# Patient Record
Sex: MALE | Race: WHITE | NOT HISPANIC OR LATINO | Employment: OTHER | URBAN - METROPOLITAN AREA
[De-identification: names, ages, dates, MRNs, and addresses within clinical notes are randomized per-mention and may not be internally consistent; named-entity substitution may affect disease eponyms.]

---

## 2024-02-14 ENCOUNTER — APPOINTMENT (EMERGENCY)
Dept: RADIOLOGY | Facility: HOSPITAL | Age: 74
DRG: 196 | End: 2024-02-14
Payer: MEDICARE

## 2024-02-14 ENCOUNTER — APPOINTMENT (INPATIENT)
Dept: NON INVASIVE DIAGNOSTICS | Facility: HOSPITAL | Age: 74
DRG: 196 | End: 2024-02-14
Payer: MEDICARE

## 2024-02-14 ENCOUNTER — APPOINTMENT (INPATIENT)
Dept: RADIOLOGY | Facility: HOSPITAL | Age: 74
DRG: 196 | End: 2024-02-14
Payer: MEDICARE

## 2024-02-14 ENCOUNTER — HOSPITAL ENCOUNTER (INPATIENT)
Facility: HOSPITAL | Age: 74
LOS: 11 days | Discharge: NURSING, REHAB OR LONG-TERM CARE | DRG: 196 | End: 2024-02-25
Attending: EMERGENCY MEDICINE | Admitting: INTERNAL MEDICINE
Payer: MEDICARE

## 2024-02-14 DIAGNOSIS — J96.01 ACUTE RESPIRATORY FAILURE WITH HYPOXIA (HCC): ICD-10-CM

## 2024-02-14 DIAGNOSIS — R91.8 PULMONARY INFILTRATES: ICD-10-CM

## 2024-02-14 DIAGNOSIS — R21 RASH: ICD-10-CM

## 2024-02-14 DIAGNOSIS — K59.00 CONSTIPATION: ICD-10-CM

## 2024-02-14 DIAGNOSIS — R29.90 STROKE-LIKE EPISODE: ICD-10-CM

## 2024-02-14 DIAGNOSIS — R09.02 HYPOXIA: Primary | ICD-10-CM

## 2024-02-14 DIAGNOSIS — J90 PLEURAL EFFUSION: ICD-10-CM

## 2024-02-14 DIAGNOSIS — I10 HYPERTENSION: ICD-10-CM

## 2024-02-14 DIAGNOSIS — G45.9 TIA (TRANSIENT ISCHEMIC ATTACK): ICD-10-CM

## 2024-02-14 PROBLEM — M79.89 SWELLING OF LOWER LEG: Status: ACTIVE | Noted: 2024-02-14

## 2024-02-14 PROBLEM — I77.819 AORTIC ECTASIA (HCC): Status: ACTIVE | Noted: 2024-02-14

## 2024-02-14 PROBLEM — N32.81 OVERACTIVE BLADDER: Status: ACTIVE | Noted: 2024-02-14

## 2024-02-14 PROBLEM — R26.89 BALANCE PROBLEM: Status: RESOLVED | Noted: 2024-02-14 | Resolved: 2024-02-14

## 2024-02-14 PROBLEM — R26.89 BALANCE PROBLEM: Status: ACTIVE | Noted: 2024-02-14

## 2024-02-14 LAB
2HR DELTA HS TROPONIN: 1 NG/L
4HR DELTA HS TROPONIN: 0 NG/L
ALBUMIN SERPL BCP-MCNC: 4.1 G/DL (ref 3.5–5)
ALP SERPL-CCNC: 67 U/L (ref 34–104)
ALT SERPL W P-5'-P-CCNC: 8 U/L (ref 7–52)
ANION GAP SERPL CALCULATED.3IONS-SCNC: 8 MMOL/L
AST SERPL W P-5'-P-CCNC: 22 U/L (ref 13–39)
ATRIAL RATE: 83 BPM
BASE EX.OXY STD BLDV CALC-SCNC: 79.7 % (ref 60–80)
BASE EXCESS BLDV CALC-SCNC: -2.5 MMOL/L
BASOPHILS # BLD AUTO: 0.04 THOUSANDS/ÂΜL (ref 0–0.1)
BASOPHILS NFR BLD AUTO: 1 % (ref 0–1)
BILIRUB SERPL-MCNC: 0.78 MG/DL (ref 0.2–1)
BUN SERPL-MCNC: 13 MG/DL (ref 5–25)
CALCIUM SERPL-MCNC: 9.3 MG/DL (ref 8.4–10.2)
CARDIAC TROPONIN I PNL SERPL HS: 13 NG/L
CARDIAC TROPONIN I PNL SERPL HS: 13 NG/L
CARDIAC TROPONIN I PNL SERPL HS: 14 NG/L
CHLORIDE SERPL-SCNC: 105 MMOL/L (ref 96–108)
CO2 SERPL-SCNC: 25 MMOL/L (ref 21–32)
CREAT SERPL-MCNC: 0.9 MG/DL (ref 0.6–1.3)
EOSINOPHIL # BLD AUTO: 1.03 THOUSAND/ÂΜL (ref 0–0.61)
EOSINOPHIL NFR BLD AUTO: 12 % (ref 0–6)
ERYTHROCYTE [DISTWIDTH] IN BLOOD BY AUTOMATED COUNT: 13.7 % (ref 11.6–15.1)
FLUAV RNA RESP QL NAA+PROBE: NEGATIVE
FLUBV RNA RESP QL NAA+PROBE: NEGATIVE
GFR SERPL CREATININE-BSD FRML MDRD: 83 ML/MIN/1.73SQ M
GLUCOSE SERPL-MCNC: 104 MG/DL (ref 65–140)
HCO3 BLDV-SCNC: 21.8 MMOL/L (ref 24–30)
HCT VFR BLD AUTO: 49.3 % (ref 36.5–49.3)
HGB BLD-MCNC: 16.7 G/DL (ref 12–17)
IMM GRANULOCYTES # BLD AUTO: 0.02 THOUSAND/UL (ref 0–0.2)
IMM GRANULOCYTES NFR BLD AUTO: 0 % (ref 0–2)
LACTATE SERPL-SCNC: 1.7 MMOL/L (ref 0.5–2)
LYMPHOCYTES # BLD AUTO: 0.89 THOUSANDS/ÂΜL (ref 0.6–4.47)
LYMPHOCYTES NFR BLD AUTO: 10 % (ref 14–44)
MCH RBC QN AUTO: 30.7 PG (ref 26.8–34.3)
MCHC RBC AUTO-ENTMCNC: 33.9 G/DL (ref 31.4–37.4)
MCV RBC AUTO: 91 FL (ref 82–98)
MONOCYTES # BLD AUTO: 0.88 THOUSAND/ÂΜL (ref 0.17–1.22)
MONOCYTES NFR BLD AUTO: 10 % (ref 4–12)
NEUTROPHILS # BLD AUTO: 5.79 THOUSANDS/ÂΜL (ref 1.85–7.62)
NEUTS SEG NFR BLD AUTO: 67 % (ref 43–75)
NRBC BLD AUTO-RTO: 0 /100 WBCS
O2 CT BLDV-SCNC: 17.3 ML/DL
P AXIS: 52 DEGREES
PCO2 BLDV: 36.4 MM HG (ref 42–50)
PH BLDV: 7.39 [PH] (ref 7.3–7.4)
PLATELET # BLD AUTO: 209 THOUSANDS/UL (ref 149–390)
PMV BLD AUTO: 8.9 FL (ref 8.9–12.7)
PO2 BLDV: 44.6 MM HG (ref 35–45)
POTASSIUM SERPL-SCNC: 4.3 MMOL/L (ref 3.5–5.3)
PR INTERVAL: 184 MS
PROCALCITONIN SERPL-MCNC: 0.05 NG/ML
PROT SERPL-MCNC: 7.6 G/DL (ref 6.4–8.4)
QRS AXIS: 13 DEGREES
QRSD INTERVAL: 92 MS
QT INTERVAL: 392 MS
QTC INTERVAL: 460 MS
RBC # BLD AUTO: 5.44 MILLION/UL (ref 3.88–5.62)
RSV RNA RESP QL NAA+PROBE: NEGATIVE
SARS-COV-2 RNA RESP QL NAA+PROBE: NEGATIVE
SODIUM SERPL-SCNC: 138 MMOL/L (ref 135–147)
T WAVE AXIS: 35 DEGREES
TSH SERPL DL<=0.05 MIU/L-ACNC: 4.52 UIU/ML (ref 0.45–4.5)
VENTRICULAR RATE: 83 BPM
WBC # BLD AUTO: 8.65 THOUSAND/UL (ref 4.31–10.16)

## 2024-02-14 PROCEDURE — 0B9D8ZX DRAINAGE OF RIGHT MIDDLE LUNG LOBE, VIA NATURAL OR ARTIFICIAL OPENING ENDOSCOPIC, DIAGNOSTIC: ICD-10-PCS | Performed by: PHYSICIAN ASSISTANT

## 2024-02-14 PROCEDURE — 87081 CULTURE SCREEN ONLY: CPT | Performed by: INTERNAL MEDICINE

## 2024-02-14 PROCEDURE — 99285 EMERGENCY DEPT VISIT HI MDM: CPT

## 2024-02-14 PROCEDURE — 84443 ASSAY THYROID STIM HORMONE: CPT

## 2024-02-14 PROCEDURE — 85025 COMPLETE CBC W/AUTO DIFF WBC: CPT | Performed by: EMERGENCY MEDICINE

## 2024-02-14 PROCEDURE — 80053 COMPREHEN METABOLIC PANEL: CPT | Performed by: EMERGENCY MEDICINE

## 2024-02-14 PROCEDURE — 87040 BLOOD CULTURE FOR BACTERIA: CPT | Performed by: EMERGENCY MEDICINE

## 2024-02-14 PROCEDURE — 84145 PROCALCITONIN (PCT): CPT | Performed by: EMERGENCY MEDICINE

## 2024-02-14 PROCEDURE — 71250 CT THORAX DX C-: CPT

## 2024-02-14 PROCEDURE — 71045 X-RAY EXAM CHEST 1 VIEW: CPT

## 2024-02-14 PROCEDURE — 82805 BLOOD GASES W/O2 SATURATION: CPT

## 2024-02-14 PROCEDURE — 84484 ASSAY OF TROPONIN QUANT: CPT | Performed by: EMERGENCY MEDICINE

## 2024-02-14 PROCEDURE — 0241U HB NFCT DS VIR RESP RNA 4 TRGT: CPT | Performed by: EMERGENCY MEDICINE

## 2024-02-14 PROCEDURE — 93010 ELECTROCARDIOGRAM REPORT: CPT | Performed by: INTERNAL MEDICINE

## 2024-02-14 PROCEDURE — 93005 ELECTROCARDIOGRAM TRACING: CPT

## 2024-02-14 PROCEDURE — G1004 CDSM NDSC: HCPCS

## 2024-02-14 PROCEDURE — 36415 COLL VENOUS BLD VENIPUNCTURE: CPT | Performed by: EMERGENCY MEDICINE

## 2024-02-14 PROCEDURE — 87449 NOS EACH ORGANISM AG IA: CPT

## 2024-02-14 PROCEDURE — 93306 TTE W/DOPPLER COMPLETE: CPT | Performed by: INTERNAL MEDICINE

## 2024-02-14 PROCEDURE — 99285 EMERGENCY DEPT VISIT HI MDM: CPT | Performed by: EMERGENCY MEDICINE

## 2024-02-14 PROCEDURE — 99223 1ST HOSP IP/OBS HIGH 75: CPT | Performed by: INTERNAL MEDICINE

## 2024-02-14 PROCEDURE — 93306 TTE W/DOPPLER COMPLETE: CPT

## 2024-02-14 PROCEDURE — 83605 ASSAY OF LACTIC ACID: CPT | Performed by: EMERGENCY MEDICINE

## 2024-02-14 RX ORDER — LEVOTHYROXINE SODIUM 88 UG/1
88 CAPSULE ORAL DAILY
COMMUNITY

## 2024-02-14 RX ORDER — CEFTRIAXONE 1 G/50ML
1000 INJECTION, SOLUTION INTRAVENOUS EVERY 24 HOURS
Status: DISCONTINUED | OUTPATIENT
Start: 2024-02-14 | End: 2024-02-18

## 2024-02-14 RX ORDER — AZITHROMYCIN 250 MG/1
500 TABLET, FILM COATED ORAL EVERY 24 HOURS
Status: DISCONTINUED | OUTPATIENT
Start: 2024-02-14 | End: 2024-02-15

## 2024-02-14 RX ORDER — POTASSIUM CHLORIDE 750 MG/1
10 TABLET, FILM COATED, EXTENDED RELEASE ORAL DAILY
COMMUNITY
End: 2024-02-25

## 2024-02-14 RX ORDER — FAMOTIDINE 20 MG/1
20 TABLET, FILM COATED ORAL DAILY
COMMUNITY

## 2024-02-14 RX ORDER — NIFEDIPINE 30 MG/1
30 TABLET, EXTENDED RELEASE ORAL DAILY
Status: DISCONTINUED | OUTPATIENT
Start: 2024-02-14 | End: 2024-02-25 | Stop reason: HOSPADM

## 2024-02-14 RX ORDER — LEVOTHYROXINE SODIUM 88 UG/1
88 TABLET ORAL
Status: DISCONTINUED | OUTPATIENT
Start: 2024-02-14 | End: 2024-02-25 | Stop reason: HOSPADM

## 2024-02-14 RX ORDER — DULOXETIN HYDROCHLORIDE 30 MG/1
30 CAPSULE, DELAYED RELEASE ORAL DAILY
COMMUNITY

## 2024-02-14 RX ORDER — FAMOTIDINE 20 MG/1
20 TABLET, FILM COATED ORAL DAILY
Status: DISCONTINUED | OUTPATIENT
Start: 2024-02-14 | End: 2024-02-25 | Stop reason: HOSPADM

## 2024-02-14 RX ORDER — HEPARIN SODIUM 5000 [USP'U]/ML
5000 INJECTION, SOLUTION INTRAVENOUS; SUBCUTANEOUS EVERY 8 HOURS SCHEDULED
Status: DISCONTINUED | OUTPATIENT
Start: 2024-02-14 | End: 2024-02-22

## 2024-02-14 RX ORDER — MIRABEGRON 50 MG/1
50 TABLET, FILM COATED, EXTENDED RELEASE ORAL DAILY
COMMUNITY

## 2024-02-14 RX ORDER — OXYBUTYNIN CHLORIDE 5 MG/1
10 TABLET, EXTENDED RELEASE ORAL DAILY
Status: DISCONTINUED | OUTPATIENT
Start: 2024-02-14 | End: 2024-02-25 | Stop reason: HOSPADM

## 2024-02-14 RX ORDER — FUROSEMIDE 10 MG/ML
40 INJECTION INTRAMUSCULAR; INTRAVENOUS ONCE
Status: COMPLETED | OUTPATIENT
Start: 2024-02-14 | End: 2024-02-14

## 2024-02-14 RX ORDER — FUROSEMIDE 20 MG/1
20 TABLET ORAL DAILY
COMMUNITY

## 2024-02-14 RX ORDER — DOCUSATE SODIUM 100 MG/1
100 CAPSULE, LIQUID FILLED ORAL 2 TIMES DAILY
Status: DISCONTINUED | OUTPATIENT
Start: 2024-02-14 | End: 2024-02-25 | Stop reason: HOSPADM

## 2024-02-14 RX ORDER — ACETAMINOPHEN 325 MG/1
650 TABLET ORAL EVERY 6 HOURS PRN
Status: DISCONTINUED | OUTPATIENT
Start: 2024-02-14 | End: 2024-02-25 | Stop reason: HOSPADM

## 2024-02-14 RX ORDER — SENNOSIDES 8.6 MG
1 TABLET ORAL DAILY
Status: DISCONTINUED | OUTPATIENT
Start: 2024-02-14 | End: 2024-02-25 | Stop reason: HOSPADM

## 2024-02-14 RX ORDER — NIFEDIPINE 30 MG/1
30 TABLET, EXTENDED RELEASE ORAL DAILY
COMMUNITY

## 2024-02-14 RX ADMIN — HEPARIN SODIUM 5000 UNITS: 5000 INJECTION INTRAVENOUS; SUBCUTANEOUS at 21:44

## 2024-02-14 RX ADMIN — HEPARIN SODIUM 5000 UNITS: 5000 INJECTION INTRAVENOUS; SUBCUTANEOUS at 11:22

## 2024-02-14 RX ADMIN — DOCUSATE SODIUM 100 MG: 100 CAPSULE, LIQUID FILLED ORAL at 11:24

## 2024-02-14 RX ADMIN — LEVOTHYROXINE SODIUM 88 MCG: 88 TABLET ORAL at 18:16

## 2024-02-14 RX ADMIN — NIFEDIPINE 30 MG: 30 TABLET, EXTENDED RELEASE ORAL at 18:03

## 2024-02-14 RX ADMIN — AZITHROMYCIN DIHYDRATE 500 MG: 250 TABLET ORAL at 18:03

## 2024-02-14 RX ADMIN — CEFTRIAXONE 1000 MG: 1 INJECTION, SOLUTION INTRAVENOUS at 18:03

## 2024-02-14 RX ADMIN — DOCUSATE SODIUM 100 MG: 100 CAPSULE, LIQUID FILLED ORAL at 18:03

## 2024-02-14 RX ADMIN — SENNOSIDES 8.6 MG: 8.6 TABLET, FILM COATED ORAL at 11:24

## 2024-02-14 RX ADMIN — OXYBUTYNIN CHLORIDE 10 MG: 5 TABLET, EXTENDED RELEASE ORAL at 18:03

## 2024-02-14 RX ADMIN — FUROSEMIDE 40 MG: 10 INJECTION, SOLUTION INTRAMUSCULAR; INTRAVENOUS at 11:20

## 2024-02-14 RX ADMIN — FAMOTIDINE 20 MG: 20 TABLET, FILM COATED ORAL at 11:24

## 2024-02-14 NOTE — ASSESSMENT & PLAN NOTE
Chronic. On Myrbetriq ER 50 mg daily switch to oxybutynin 10 mg  Monitor I's and O's  Obtain PVR  Urinary retention protocol

## 2024-02-14 NOTE — ASSESSMENT & PLAN NOTE
"POA w/ lethargy, feeling weak, having a \"coughing fits\" from [facility] living  Hypoxic upon arrival 84% on room air, placed on 4 L with resolution now saturations are greater than 95%  Not on oxygen at baseline  No recent sick contacts, denies fever or chills  No leukocytosis & afebrile.  Negative for COVID Flu RSV  X-ray demonstrated bilateral small pleural effusion but cannot exclude infectious process  Obtain CT scan of chest without contrast:   Superimposed mixed groundglass and alveolar opacities bilaterally appear asymmetric. Findings suggest infectious etiology. Pulmonary edema also possible but less likely. If there is continued concern for interstitial lung disease, would recommend  high-resolution CT scan after the patient's clinical condition improves as an outpatient.  Rales noted bilaterally  Give 1 dose of IV 40 mg of Lasix now  Started Antbx based on CT results:   Iv Rocephin 1000 mg q 24 hours  Azithromycin 500 mg q 24 hours  Consult Pulm appreciate input  Pulmonary hygiene  Obtain sputum culture f/u   Obtain Urine strep & pneumo f/u  Titrate O2  saturation >90%  Monitor CBC with differential and fever curve    "

## 2024-02-14 NOTE — PLAN OF CARE
Problem: Prexisting or High Potential for Compromised Skin Integrity  Goal: Skin integrity is maintained or improved  Description: INTERVENTIONS:  - Identify patients at risk for skin breakdown  - Assess and monitor skin integrity  - Assess and monitor nutrition and hydration status  - Monitor labs   - Assess for incontinence   - Turn and reposition patient  - Assist with mobility/ambulation  - Relieve pressure over bony prominences  - Avoid friction and shearing  - Provide appropriate hygiene as needed including keeping skin clean and dry  - Evaluate need for skin moisturizer/barrier cream  - Collaborate with interdisciplinary team   - Patient/family teaching  - Consider wound care consult   Outcome: Progressing     Problem: SAFETY ADULT  Goal: Patient will remain free of falls  Description: INTERVENTIONS:  - Educate patient/family on patient safety including physical limitations  - Instruct patient to call for assistance with activity   - Consult OT/PT to assist with strengthening/mobility   - Keep Call bell within reach  - Keep bed low and locked with side rails adjusted as appropriate  - Keep care items and personal belongings within reach  - Initiate and maintain comfort rounds  - Make Fall Risk Sign visible to staff  - Offer Toileting every 2 Hours, in advance of need  - Initiate/Maintain bed alarm  - Obtain necessary fall risk management equipment:   - Apply yellow socks and bracelet for high fall risk patients  - Consider moving patient to room near nurses station  Outcome: Progressing  Goal: Maintain or return to baseline ADL function  Description: INTERVENTIONS:  -  Assess patient's ability to carry out ADLs; assess patient's baseline for ADL function and identify physical deficits which impact ability to perform ADLs (bathing, care of mouth/teeth, toileting, grooming, dressing, etc.)  - Assess/evaluate cause of self-care deficits   - Assess range of motion  - Assess patient's mobility; develop plan if  impaired  - Assess patient's need for assistive devices and provide as appropriate  - Encourage maximum independence but intervene and supervise when necessary  - Involve family in performance of ADLs  - Assess for home care needs following discharge   - Consider OT consult to assist with ADL evaluation and planning for discharge  - Provide patient education as appropriate  Outcome: Progressing  Goal: Maintains/Returns to pre admission functional level  Description: INTERVENTIONS:  - Perform AM-PAC 6 Click Basic Mobility/ Daily Activity assessment daily.  - Set and communicate daily mobility goal to care team and patient/family/caregiver.   - Collaborate with rehabilitation services on mobility goals if consulted  - Perform Range of Motion 2 times a day.  - Reposition patient every 2 hours.  - Dangle patient 3 times a day  - Stand patient 3 times a day  - Ambulate patient 2 times a day  - Out of bed to chair 2 times a day   - Out of bed for meals 2 times a day  - Out of bed for toileting  - Record patient progress and toleration of activity level   Outcome: Progressing     Problem: RESPIRATORY - ADULT  Goal: Achieves optimal ventilation and oxygenation  Description: INTERVENTIONS:  - Assess for changes in respiratory status  - Assess for changes in mentation and behavior  - Position to facilitate oxygenation and minimize respiratory effort  - Oxygen administered by appropriate delivery if ordered  - Initiate smoking cessation education as indicated  - Encourage broncho-pulmonary hygiene including cough, deep breathe, Incentive Spirometry  - Assess the need for suctioning and aspirate as needed  - Assess and instruct to report SOB or any respiratory difficulty  - Respiratory Therapy support as indicated  Outcome: Progressing

## 2024-02-14 NOTE — ED PROVIDER NOTES
History  Chief Complaint   Patient presents with    Shortness of Breath     Pt started with SOB over past day, covid/flu occuring at living facility. Started on 4L NC     73-year-old male past medical to significant for hypertension presenting to the ER today with cough and shortness of breath.  Patient has been coughing over the last couple days but started with shortness of breath upon awakening this morning.  Per EMS patient was hypoxic to 88% and is on 4 L nasal cannula.  Patient denying any chest pain.  Denying any fevers or chills.  No nausea or vomiting.  No abdominal pain.        None       Past Medical History:   Diagnosis Date    GERD without esophagitis     Hypertension     Hypothyroid     Prostate CA (HCC)        History reviewed. No pertinent surgical history.    History reviewed. No pertinent family history.  I have reviewed and agree with the history as documented.    E-Cigarette/Vaping    E-Cigarette Use Never User      E-Cigarette/Vaping Substances     Social History     Tobacco Use    Smoking status: Former     Types: Cigarettes    Smokeless tobacco: Never   Vaping Use    Vaping status: Never Used   Substance Use Topics    Alcohol use: Not Currently    Drug use: Never       Review of Systems   Constitutional:  Negative for chills and fever.   HENT:  Negative for hearing loss.    Eyes:  Negative for visual disturbance.   Respiratory:  Positive for cough and shortness of breath.    Cardiovascular:  Negative for chest pain.   Gastrointestinal:  Negative for abdominal pain, constipation, diarrhea, nausea and vomiting.   Genitourinary:  Negative for difficulty urinating.   Musculoskeletal:  Negative for myalgias.   Skin:  Negative for rash.   Neurological:  Negative for dizziness.   Psychiatric/Behavioral:  Negative for agitation.    All other systems reviewed and are negative.      Physical Exam  Physical Exam  Vitals and nursing note reviewed.   Constitutional:       Appearance: Normal appearance. He  is ill-appearing.   HENT:      Head: Normocephalic and atraumatic.      Right Ear: External ear normal.      Left Ear: External ear normal.      Nose: Nose normal. No congestion.      Mouth/Throat:      Mouth: Mucous membranes are moist.      Pharynx: Oropharynx is clear. No oropharyngeal exudate.   Eyes:      General:         Right eye: No discharge.         Left eye: No discharge.      Extraocular Movements: Extraocular movements intact.      Conjunctiva/sclera: Conjunctivae normal.      Pupils: Pupils are equal, round, and reactive to light.   Cardiovascular:      Rate and Rhythm: Normal rate and regular rhythm.      Pulses: Normal pulses.      Heart sounds: Normal heart sounds.   Pulmonary:      Effort: Respiratory distress present.      Breath sounds: Rhonchi present.      Comments: Hypoxic on room air; Placed on NC with improvement in O2 to 92%  Abdominal:      General: Abdomen is flat. Bowel sounds are normal. There is no distension.      Palpations: Abdomen is soft.      Tenderness: There is no abdominal tenderness.   Musculoskeletal:         General: No swelling. Normal range of motion.      Cervical back: Normal range of motion.   Skin:     General: Skin is warm and dry.      Capillary Refill: Capillary refill takes less than 2 seconds.   Neurological:      General: No focal deficit present.      Mental Status: He is alert and oriented to person, place, and time. Mental status is at baseline.      Cranial Nerves: No cranial nerve deficit.      Sensory: No sensory deficit.      Motor: No weakness.   Psychiatric:         Mood and Affect: Mood normal.         Behavior: Behavior normal.         Vital Signs  ED Triage Vitals [02/14/24 0507]   Temperature Pulse Respirations Blood Pressure SpO2   97.9 °F (36.6 °C) 89 (!) 30 149/83 (!) 84 %      Temp Source Heart Rate Source Patient Position - Orthostatic VS BP Location FiO2 (%)   Oral Monitor Lying Left arm --      Pain Score       --           Vitals:     02/14/24 0507 02/14/24 0600 02/14/24 0630   BP: 149/83 130/75 122/73   Pulse: 89 82 78   Patient Position - Orthostatic VS: Lying Lying Lying         Visual Acuity      ED Medications  Medications - No data to display    Diagnostic Studies  Results Reviewed       Procedure Component Value Units Date/Time    HS Troponin I 2hr [[id number]] Updated: 02/14/24 0709    Lab Status: No result Specimen: Blood from Arm, Left     FLU/RSV/COVID - if FLU/RSV clinically relevant [[id number]]  (Normal) Collected: 02/14/24 0515    Lab Status: Final result Specimen: Nares from Nose Updated: 02/14/24 0624     SARS-CoV-2 Negative     INFLUENZA A PCR Negative     INFLUENZA B PCR Negative     RSV PCR Negative    Narrative:      FOR PEDIATRIC PATIENTS - copy/paste COVID Guidelines URL to browser: [email or web address]    SARS-CoV-2 assay is a Nucleic Acid Amplification assay intended for the  qualitative detection of nucleic acid from SARS-CoV-2 in nasopharyngeal  swabs. Results are for the presumptive identification of SARS-CoV-2 RNA.    Positive results are indicative of infection with SARS-CoV-2, the virus  causing COVID-19, but do not rule out bacterial infection or co-infection  with other viruses. Laboratories within the [place] and its  territories are required to report all positive results to the appropriate  public health authorities. Negative results do not preclude SARS-CoV-2  infection and should not be used as the sole basis for treatment or other  patient management decisions. Negative results must be combined with  clinical observations, patient history, and epidemiological information.  This test has not been FDA cleared or approved.    This test has been authorized by FDA under an Emergency Use Authorization  (EUA). This test is only authorized for the duration of time the  declaration that circumstances exist justifying the authorization of the  emergency use of  an in vitro diagnostic tests for detection of SARS-CoV-2  virus and/or diagnosis of COVID-19 infection under section 564(b)(1) of  the Act, 21 U.S.C. 360bbb-3(b)(1), unless the authorization is terminated  or revoked sooner. The test has been validated but independent review by FDA  and CLIA is pending.    Test performed using [employer] GeneXpert: This RT-PCR assay targets N2,  a region unique to SARS-CoV-2. A conserved region in the E-gene was chosen  for pan-Sarbecovirus detection which includes SARS-CoV-2.    According to CMS-2020-01-R, this platform meets the definition of high-throughput technology.    Procalcitonin [[id number]]  (Normal) Collected: 02/14/24 0515    Lab Status: Final result Specimen: Blood from Arm, Right Updated: 02/14/24 0602     Procalcitonin 0.05 ng/ml     Lactic acid, plasma (w/reflex if result > 2.0) [[id number]]  (Normal) Collected: 02/14/24 0515    Lab Status: Final result Specimen: Blood from Arm, Right Updated: 02/14/24 0553     LACTIC ACID 1.7 mmol/L     Narrative:      Result may be elevated if tourniquet was used during collection.    Comprehensive metabolic panel [[id number]] Collected: 02/14/24 0515    Lab Status: Final result Specimen: Blood from Arm, Right Updated: 02/14/24 0552     Sodium 138 mmol/L      Potassium 4.3 mmol/L      Chloride 105 mmol/L      CO2 25 mmol/L      ANION GAP 8 mmol/L      BUN 13 mg/dL      Creatinine 0.90 mg/dL      Glucose 104 mg/dL      Calcium 9.3 mg/dL      AST 22 U/L      ALT 8 U/L      Alkaline Phosphatase 67 U/L      Total Protein 7.6 g/dL      Albumin 4.1 g/dL      Total Bilirubin 0.78 mg/dL      eGFR 83 ml/min/1.73sq m     Narrative:      National Kidney Disease Foundation guidelines for Chronic Kidney Disease (CKD):     Stage 1 with normal or high GFR (GFR > 90 mL/min/1.73 square meters)    Stage 2 Mild CKD (GFR = 60-89 mL/min/1.73 square meters)    Stage 3A Moderate CKD (GFR = 45-59 mL/min/1.73 square meters)    Stage 3B Moderate CKD (GFR = 30-44  mL/min/1.73 square meters)    Stage 4 Severe CKD (GFR = 15-29 mL/min/1.73 square meters)    Stage 5 End Stage CKD (GFR <15 mL/min/1.73 square meters)  Note: GFR calculation is accurate only with a steady state creatinine    HS Troponin 0hr (reflex protocol) [[id number]]  (Normal) Collected: 02/14/24 0515    Lab Status: Final result Specimen: Blood from Arm, Right Updated: 02/14/24 0547     hs TnI 0hr 13 ng/L     CBC and differential [[id number]]  (Abnormal) Collected: 02/14/24 0515    Lab Status: Final result Specimen: Blood from Arm, Right Updated: 02/14/24 0528     WBC 8.65 Thousand/uL      RBC 5.44 Million/uL      Hemoglobin 16.7 g/dL      Hematocrit 49.3 %      MCV 91 fL      MCH 30.7 pg      MCHC 33.9 g/dL      RDW 13.7 %      MPV 8.9 fL      Platelets 209 Thousands/uL      nRBC 0 /100 WBCs      Neutrophils Relative 67 %      Immat GRANS % 0 %      Lymphocytes Relative 10 %      Monocytes Relative 10 %      Eosinophils Relative 12 %      Basophils Relative 1 %      Neutrophils Absolute 5.79 Thousands/µL      Immature Grans Absolute 0.02 Thousand/uL      Lymphocytes Absolute 0.89 Thousands/µL      Monocytes Absolute 0.88 Thousand/µL      Eosinophils Absolute 1.03 Thousand/µL      Basophils Absolute 0.04 Thousands/µL     Blood culture [[id number]] Collected: 02/14/24 0515    Lab Status: In process Specimen: Blood from Arm, Right Updated: 02/14/24 0521    Blood culture [[id number]] Collected: 02/14/24 0516    Lab Status: In process Specimen: Blood from Arm, Left Updated: 02/14/24 0521                   XR chest 1 view portable   ED Interpretation by Kush Gaines MD (02/14 0607)   Right sided pleural effusion based on my interpretation                 Procedures  ECG 12 Lead Documentation Only    Date/Time: 2/14/2024 5:30 AM    Performed by: Kush Gaines MD  Authorized by: Kush Gaines MD    Indications / Diagnosis:  SOB  ECG reviewed by me, the ED Provider: yes    Patient location:  ED  Previous ECG:     Previous  ECG:  Unavailable    Comparison to cardiac monitor: No    Interpretation:     Interpretation: normal    Rate:     ECG rate assessment: normal    Rhythm:     Rhythm: sinus rhythm    Ectopy:     Ectopy: none    QRS:     QRS axis:  Normal    QRS intervals:  Normal  Conduction:     Conduction: normal    ST segments:     ST segments:  Normal  T waves:     T waves: normal             ED Course  ED Course as of 02/14/24 0713 Wed Feb 14, 2024   0509 SpO2(!): 84 %  Placed on NC at 4L. Responded to 92%   0528 CBC and differential(!)  No elevated white count. Surprising given his hypoxia. Hgb WNL   0601 Comprehensive metabolic panel  WNL   0603 Procalcitonin: 0.05  WNL   0603 LACTIC ACID: 1.7  WNL                               SBIRT 22yo+      Flowsheet Row Most Recent Value   Initial Alcohol Screen: US AUDIT-C     1. How often do you have a drink containing alcohol? 0 Filed at: 02/14/2024 0517   2. How many drinks containing alcohol do you have on a typical day you are drinking?  0 Filed at: 02/14/2024 0517   3a. Male UNDER 65: How often do you have five or more drinks on one occasion? 0 Filed at: 02/14/2024 0517   3b. FEMALE Any Age, or MALE 65+: How often do you have 4 or more drinks on one occassion? 0 Filed at: 02/14/2024 0517   Audit-C Score 0 Filed at: 02/14/2024 0517   XOCHILT: How many times in the past year have you...    Used an illegal drug or used a prescription medication for non-medical reasons? Never Filed at: 02/14/2024 0517                      Medical Decision Making  73-year-old male presenting to ED today with cough, shortness of breath, hypoxia.  At this time differential diagnosis includes pneumonia versus pneumothorax versus COVID/flu versus ACS.  Will evaluate at this time with CBC, CMP, troponin, twelve-lead EKG, chest x-ray.  Will check blood cultures as well as lactic/Pro-Devaughn given the patient is already tachypneic and I expect that he will have leukocytosis which will make him SIRS positive.   Patient will need admission regardless secondary to hypoxia.  More than likely this will be viral process given positive COVID flu contacts at assisted living where patient is coming from.    Amount and/or Complexity of Data Reviewed  Labs: ordered. Decision-making details documented in ED Course.  Radiology: ordered and independent interpretation performed.    Risk  Decision regarding hospitalization.             Disposition  Final diagnoses:   Hypoxia   Pleural effusion   Hypertension     Time reflects when diagnosis was documented in both MDM as applicable and the Disposition within this note       Time User Action Codes Description Comment    2/14/2024  7:03 AM Kush Gaines [R09.02] Hypoxia     2/14/2024  7:03 AM Kush Gaines [J90] Pleural effusion     2/14/2024  7:03 AM Kush Gaines [I10] Hypertension           ED Disposition       ED Disposition   Admit    Condition   Stable    Date/Time   Wed Feb 14, 2024 0705    Comment   Case was discussed with Dr. Morin and the patient's admission status was agreed to be Admission Status: inpatient status to the service of Dr. Morin .               Follow-up Information    None         Patient's Medications    No medications on file       No discharge procedures on file.    PDMP Review       None            ED Provider  Electronically Signed by             Kush Gaines MD  02/14/24 0750

## 2024-02-14 NOTE — H&P
"Formerly [facility]  H&P  Name: Neeraj Palacios 73 y.o. male I MRN: [id number]  Unit/Bed#: [address] [place] [address] I Date of Admission: 2/14/2024   Date of Service: 2/14/2024 I Hospital Day: 0      Assessment/Plan   * Hypoxia  Assessment & Plan  POA w/ lethargy, feeling weak, having a \"coughing fits\" from [facility]  Hypoxic upon arrival 84% on room air, placed on 4 L with resolution now saturations are greater than 95%  Not on oxygen at baseline  No recent sick contacts, denies fever or chills  No leukocytosis & afebrile.  Negative for COVID Flu RSV  X-ray demonstrated bilateral small pleural effusion but cannot exclude infectious process  Obtain CT scan of chest without contrast:   Superimposed mixed groundglass and alveolar opacities bilaterally appear asymmetric. Findings suggest infectious etiology. Pulmonary edema also possible but less likely. If there is continued concern for interstitial lung disease, would recommend  high-resolution CT scan after the patient's clinical condition improves as an outpatient.  Rales noted bilaterally  Give 1 dose of IV 40 mg of Lasix now  Started Antbx based on CT results:   Iv Rocephin 1000 mg q 24 hours  Azithromycin 500 mg q 24 hours  Consult Pulm appreciate input  Pulmonary hygiene  Obtain sputum culture f/u   Obtain Urine strep & pneumo f/u  Titrate O2  saturation >90%  Monitor CBC with differential and fever curve      Swelling of lower leg  Assessment & Plan  History of lower leg edema  Patient takes furosemide 20 mg daily, held  +3 bilateral lower leg edema noted per patient was noted to be worsened  Given 40 mg IV lasix x1   Obtain echo follow-up  Monitor daily weights and I's and O's    Hypertension  Assessment & Plan  History of on Procardia 30 mg daily  Monitor BP  Continue Procardia 30 mg daily      Overactive bladder  Assessment & Plan  Chronic. On Myrbetriq ER 50 mg daily switch to oxybutynin 10 mg  Monitor I's and O's  Obtain PVR  Urinary " retention protocol    Balance problem  Assessment & Plan  Wheelchair bound at [facility]  Sister consider of worsening mobility  PT/OT consulted       VTE Pharmacologic Prophylaxis:   High Risk (Score >/= 5) - Pharmacological DVT Prophylaxis Ordered: heparin. Sequential Compression Devices Ordered.  Code Status: Level 1 - Full Code full  Discussion with family: Updated  (sister) at bedside.    Anticipated Length of Stay: Patient will be admitted on an inpatient basis with an anticipated length of stay of greater than 2 midnights secondary to requiring antbx IV treatment and pulmonary consulted.    Total Time Spent on Date of Encounter in care of patient: 70 mins. This time was spent on one or more of the following: performing physical exam; counseling and coordination of care; obtaining or reviewing history; documenting in the medical record; reviewing/ordering tests, medications or procedures; communicating with other healthcare professionals and discussing with patient's family/caregivers.    Chief Complaint: lethargy, feeling weak and having coughing fits from [facility]    History of Present Illness:  Neeraj Palacios is a 73 y.o. male with a PMH of hypertension, overactive bladder, prostate cancer, who presents with lethargy, headache, uncontrollable coughing from [facility]. Found to be hypoxic on room air 84% in the ED.  Improved with 4 L of nasal cannula oxygen saturations now greater than 95% on 4 L.  Patient states in the last 24 hours have been feeling lethargic, short of breath, & uncontrollable coughing.  Has improved since being placed on O2. Denies any sick contacts. No leukocytosis & afebrile. COVID/FLU/RSV negative.  Not sure what he received for the cough and unclear on what medications given at [facility] . X-ray of chest demonstrated bilateral pleural pleural effusion representing edema, interstitial lung disease infection.  Further workup for hypoxic episode, plan  stated above.     Review of Systems:  Review of Systems   Constitutional:  Positive for activity change. Negative for chills and fever.   HENT: Negative.     Eyes: Negative.    Respiratory:  Positive for cough.    Cardiovascular:  Positive for leg swelling.   Gastrointestinal: Negative.    Endocrine: Negative.    Genitourinary:  Positive for urgency.   Musculoskeletal:  Positive for gait problem.   Neurological:  Positive for weakness.   Psychiatric/Behavioral: Negative.         Past Medical and Surgical History:   Past Medical History:   Diagnosis Date    GERD without esophagitis     Hypertension     Hypothyroid     Prostate CA (HCC)        History reviewed. No pertinent surgical history.    Meds/Allergies:  Prior to Admission medications    Medication Sig Start Date End Date Taking? Authorizing Provider   DULoxetine (CYMBALTA) 30 mg delayed release capsule Take 30 mg by mouth daily   Yes Historical Provider, MD   famotidine (PEPCID) 20 mg tablet Take 20 mg by mouth daily   Yes Historical Provider, MD   furosemide (LASIX) 20 mg tablet Take 20 mg by mouth daily   Yes Historical Provider, MD   Levothyroxine Sodium 88 MCG CAPS Take 88 mcg by mouth daily   Yes Historical Provider, MD   Mirabegron ER (Myrbetriq) 50 MG TB24 Take 50 mg by mouth daily   Yes Historical Provider, MD   NIFEdipine (Procardia XL) 30 mg 24 hr tablet Take 30 mg by mouth daily   Yes Historical Provider, MD   potassium chloride (Klor-Con) 10 mEq tablet Take 10 mEq by mouth daily   Yes Historical Provider, MD     I have reveiwed home medications using records provided by [facility].    Allergies: No Known Allergies    Social History:  Marital Status:    Occupation: retired   Patient Pre-hospital Living Situation: Skilled Nursing Facility: [facility]  Patient Pre-hospital Level of Mobility: manual wheelchair  Patient Pre-hospital Diet Restrictions: none  Substance Use History:   Social History     Substance and Sexual Activity   Alcohol Use Yes     "Alcohol/week: 1.0 standard drink of alcohol    Types: 1 Cans of beer per week     Social History     Tobacco Use   Smoking Status Former    Types: Cigarettes   Smokeless Tobacco Never     Social History     Substance and Sexual Activity   Drug Use Never       Family History:  History reviewed. No pertinent family history.    Physical Exam:     Vitals:   Blood Pressure: 125/75 (02/14/24 1335)  Pulse: 63 (02/14/24 1118)  Temperature: 98.2 °F (36.8 °C) (02/14/24 1118)  Temp Source: Oral (02/14/24 1118)  Respirations: (!) 24 (02/14/24 1118)  Height: 6' 3\" (190.5 cm) (02/14/24 1335)  Weight - Scale: 107 kg (236 lb) (02/14/24 1335)  SpO2: 97 % (02/14/24 1118)    Physical Exam  Vitals and nursing note reviewed.   Constitutional:       General: He is not in acute distress.     Appearance: He is well-developed.   HENT:      Head: Normocephalic and atraumatic.   Eyes:      Conjunctiva/sclera: Conjunctivae normal.   Cardiovascular:      Rate and Rhythm: Normal rate and regular rhythm.      Heart sounds: Normal heart sounds. No murmur heard.  Pulmonary:      Effort: Pulmonary effort is normal. No respiratory distress.      Breath sounds: Examination of the left-upper field reveals rales. Examination of the left-middle field reveals rales. Examination of the right-lower field reveals rales. Rales present.   Abdominal:      Palpations: Abdomen is soft.      Tenderness: There is no abdominal tenderness.   Musculoskeletal:         General: Swelling present.      Cervical back: Neck supple.      Right lower leg: Pitting Edema present.      Left lower leg: Pitting Edema present.   Skin:     General: Skin is warm and dry.      Capillary Refill: Capillary refill takes less than 2 seconds.   Neurological:      Mental Status: He is alert.   Psychiatric:         Mood and Affect: Mood normal.          Additional Data:     Lab Results:  Results from last 7 days   Lab Units 02/14/24  0515   WBC Thousand/uL 8.65   HEMOGLOBIN g/dL 16.7 "   HEMATOCRIT % 49.3   PLATELETS Thousands/uL 209   NEUTROS PCT % 67   LYMPHS PCT % 10*   MONOS PCT % 10   EOS PCT % 12*     Results from last 7 days   Lab Units 02/14/24  0515   SODIUM mmol/L 138   POTASSIUM mmol/L 4.3   CHLORIDE mmol/L 105   CO2 mmol/L 25   BUN mg/dL 13   CREATININE mg/dL 0.90   ANION GAP mmol/L 8   CALCIUM mg/dL 9.3   ALBUMIN g/dL 4.1   TOTAL BILIRUBIN mg/dL 0.78   ALK PHOS U/L 67   ALT U/L 8   AST U/L 22   GLUCOSE RANDOM mg/dL 104                 Results from last 7 days   Lab Units 02/14/24  0515   LACTIC ACID mmol/L 1.7   PROCALCITONIN ng/ml 0.05       Lines/Drains:  Invasive Devices       Peripheral Intravenous Line  Duration             Peripheral IV 02/14/24 Left Antecubital <1 day                        Imaging: Reviewed radiology reports from this admission including: chest CT scan  CT chest wo contrast   Final Result by Vishnu Garibay MD (02/14 1025)      Background emphysema with scattered bulla noted in the upper lobes.      Superimposed mixed groundglass and alveolar opacities bilaterally appear asymmetric. Findings suggest infectious etiology. Pulmonary edema also possible but less likely. If there is continued concern for interstitial lung disease, would recommend    high-resolution CT scan after the patient's clinical condition improves as an outpatient.      Likely reactive lymph nodes.      Fusiform ectasia of the ascending thoracic aorta measuring up to 44 mm. Recommendation is for follow-up low radiation dose chest CT in one year.         The study was marked in EPIC for significant notification an follow-up notification at 1 year.         Workstation performed: [id number]         XR chest 1 view portable   ED Interpretation by Kush Gaines MD (02/14 0607)   Right sided pleural effusion based on my interpretation      Final Result by Karan Rodriguez MD (02/14 0905)      Bilateral pulmonary reticulation and possible small bilateral pleural effusions can represent edema.  Interstitial lung disease and infection can have a similar appearance.            Workstation performed: [id number]             EKG and Other Studies Reviewed on Admission:   EKG: NSR. HR 83.    ** Please Note: This note has been constructed using a voice recognition system. **

## 2024-02-14 NOTE — ASSESSMENT & PLAN NOTE
History of lower leg edema  Patient takes furosemide 20 mg daily, held  +3 bilateral lower leg edema noted per patient was noted to be worsened  Given 40 mg IV lasix x1   Obtain echo follow-up  Monitor daily weights and I's and O's

## 2024-02-14 NOTE — INCIDENTAL FINDINGS
The following findings require follow up:  Radiographic finding   Finding: Ct chest without IV contrast: Superimposed mixed groundglass and alveolar opacities bilaterally appear asymmetric. Findings suggest infectious etiology. Pulmonary edema also possible but less likely. If there is continued concern for interstitial lung disease, would recommend   high-resolution CT scan after the patient's clinical condition improves as an outpatient.     Fusiform ectasia of the ascending thoracic aorta measuring up to 44 mm. Recommendation is for follow-up low radiation dose chest CT in one year.    Follow up required: Repeat CT scan as outpatient    Follow up should be done within 12 month(s)    Please notify the following clinician to assist with the follow up:   PCP

## 2024-02-15 LAB
ANION GAP SERPL CALCULATED.3IONS-SCNC: 10 MMOL/L
BASOPHILS # BLD AUTO: 0.03 THOUSANDS/ÂΜL (ref 0–0.1)
BASOPHILS NFR BLD AUTO: 1 % (ref 0–1)
BUN SERPL-MCNC: 14 MG/DL (ref 5–25)
CALCIUM SERPL-MCNC: 9.2 MG/DL (ref 8.4–10.2)
CHLORIDE SERPL-SCNC: 106 MMOL/L (ref 96–108)
CO2 SERPL-SCNC: 23 MMOL/L (ref 21–32)
CREAT SERPL-MCNC: 0.82 MG/DL (ref 0.6–1.3)
EOSINOPHIL # BLD AUTO: 0.93 THOUSAND/ÂΜL (ref 0–0.61)
EOSINOPHIL NFR BLD AUTO: 15 % (ref 0–6)
ERYTHROCYTE [DISTWIDTH] IN BLOOD BY AUTOMATED COUNT: 13.4 % (ref 11.6–15.1)
GFR SERPL CREATININE-BSD FRML MDRD: 87 ML/MIN/1.73SQ M
GLUCOSE SERPL-MCNC: 78 MG/DL (ref 65–140)
HCT VFR BLD AUTO: 46.3 % (ref 36.5–49.3)
HGB BLD-MCNC: 15.3 G/DL (ref 12–17)
IMM GRANULOCYTES # BLD AUTO: 0.01 THOUSAND/UL (ref 0–0.2)
IMM GRANULOCYTES NFR BLD AUTO: 0 % (ref 0–2)
L PNEUMO1 AG UR QL IA.RAPID: NEGATIVE
LYMPHOCYTES # BLD AUTO: 1.08 THOUSANDS/ÂΜL (ref 0.6–4.47)
LYMPHOCYTES NFR BLD AUTO: 17 % (ref 14–44)
MCH RBC QN AUTO: 29.9 PG (ref 26.8–34.3)
MCHC RBC AUTO-ENTMCNC: 33 G/DL (ref 31.4–37.4)
MCV RBC AUTO: 91 FL (ref 82–98)
MONOCYTES # BLD AUTO: 0.75 THOUSAND/ÂΜL (ref 0.17–1.22)
MONOCYTES NFR BLD AUTO: 12 % (ref 4–12)
NEUTROPHILS # BLD AUTO: 3.47 THOUSANDS/ÂΜL (ref 1.85–7.62)
NEUTS SEG NFR BLD AUTO: 55 % (ref 43–75)
NRBC BLD AUTO-RTO: 0 /100 WBCS
PLATELET # BLD AUTO: 218 THOUSANDS/UL (ref 149–390)
PMV BLD AUTO: 9.3 FL (ref 8.9–12.7)
POTASSIUM SERPL-SCNC: 3.6 MMOL/L (ref 3.5–5.3)
RBC # BLD AUTO: 5.11 MILLION/UL (ref 3.88–5.62)
S PNEUM AG UR QL: NEGATIVE
SODIUM SERPL-SCNC: 139 MMOL/L (ref 135–147)
WBC # BLD AUTO: 6.27 THOUSAND/UL (ref 4.31–10.16)

## 2024-02-15 PROCEDURE — 99232 SBSQ HOSP IP/OBS MODERATE 35: CPT

## 2024-02-15 PROCEDURE — 97167 OT EVAL HIGH COMPLEX 60 MIN: CPT

## 2024-02-15 PROCEDURE — 97110 THERAPEUTIC EXERCISES: CPT

## 2024-02-15 PROCEDURE — 80048 BASIC METABOLIC PNL TOTAL CA: CPT

## 2024-02-15 PROCEDURE — 99223 1ST HOSP IP/OBS HIGH 75: CPT | Performed by: STUDENT IN AN ORGANIZED HEALTH CARE EDUCATION/TRAINING PROGRAM

## 2024-02-15 PROCEDURE — 85025 COMPLETE CBC W/AUTO DIFF WBC: CPT

## 2024-02-15 PROCEDURE — 97163 PT EVAL HIGH COMPLEX 45 MIN: CPT

## 2024-02-15 RX ORDER — FUROSEMIDE 20 MG/1
20 TABLET ORAL DAILY
Status: DISCONTINUED | OUTPATIENT
Start: 2024-02-15 | End: 2024-02-21

## 2024-02-15 RX ADMIN — CEFTRIAXONE 1000 MG: 1 INJECTION, SOLUTION INTRAVENOUS at 15:41

## 2024-02-15 RX ADMIN — HEPARIN SODIUM 5000 UNITS: 5000 INJECTION INTRAVENOUS; SUBCUTANEOUS at 05:06

## 2024-02-15 RX ADMIN — HEPARIN SODIUM 5000 UNITS: 5000 INJECTION INTRAVENOUS; SUBCUTANEOUS at 21:22

## 2024-02-15 RX ADMIN — DOCUSATE SODIUM 100 MG: 100 CAPSULE, LIQUID FILLED ORAL at 17:38

## 2024-02-15 RX ADMIN — OXYBUTYNIN CHLORIDE 10 MG: 5 TABLET, EXTENDED RELEASE ORAL at 09:26

## 2024-02-15 RX ADMIN — FAMOTIDINE 20 MG: 20 TABLET, FILM COATED ORAL at 09:26

## 2024-02-15 RX ADMIN — DOCUSATE SODIUM 100 MG: 100 CAPSULE, LIQUID FILLED ORAL at 09:27

## 2024-02-15 RX ADMIN — NIFEDIPINE 30 MG: 30 TABLET, EXTENDED RELEASE ORAL at 09:27

## 2024-02-15 RX ADMIN — LEVOTHYROXINE SODIUM 88 MCG: 88 TABLET ORAL at 05:06

## 2024-02-15 RX ADMIN — FUROSEMIDE 20 MG: 20 TABLET ORAL at 12:04

## 2024-02-15 RX ADMIN — HEPARIN SODIUM 5000 UNITS: 5000 INJECTION INTRAVENOUS; SUBCUTANEOUS at 15:28

## 2024-02-15 RX ADMIN — SENNOSIDES 8.6 MG: 8.6 TABLET, FILM COATED ORAL at 09:27

## 2024-02-15 NOTE — PLAN OF CARE
Problem: Prexisting or High Potential for Compromised Skin Integrity  Goal: Skin integrity is maintained or improved  Description: INTERVENTIONS:  - Identify patients at risk for skin breakdown  - Assess and monitor skin integrity  - Assess and monitor nutrition and hydration status  - Monitor labs   - Assess for incontinence   - Turn and reposition patient  - Assist with mobility/ambulation  - Relieve pressure over bony prominences  - Avoid friction and shearing  - Provide appropriate hygiene as needed including keeping skin clean and dry  - Evaluate need for skin moisturizer/barrier cream  - Collaborate with interdisciplinary team   - Patient/family teaching  - Consider wound care consult   Outcome: Progressing     Problem: SAFETY ADULT  Goal: Patient will remain free of falls  Description: INTERVENTIONS:  - Educate patient/family on patient safety including physical limitations  - Instruct patient to call for assistance with activity   - Consult OT/PT to assist with strengthening/mobility   - Keep Call bell within reach  - Keep bed low and locked with side rails adjusted as appropriate  - Keep care items and personal belongings within reach  - Initiate and maintain comfort rounds  - Make Fall Risk Sign visible to staff  - Offer Toileting every 2 Hours, in advance of need  - Initiate/Maintain bed alarm  - Obtain necessary fall risk management equipment: n/a  - Apply yellow socks and bracelet for high fall risk patients  - Consider moving patient to room near nurses station  Outcome: Progressing  Goal: Maintain or return to baseline ADL function  Description: INTERVENTIONS:  -  Assess patient's ability to carry out ADLs; assess patient's baseline for ADL function and identify physical deficits which impact ability to perform ADLs (bathing, care of mouth/teeth, toileting, grooming, dressing, etc.)  - Assess/evaluate cause of self-care deficits   - Assess range of motion  - Assess patient's mobility; develop plan  if impaired  - Assess patient's need for assistive devices and provide as appropriate  - Encourage maximum independence but intervene and supervise when necessary  - Involve family in performance of ADLs  - Assess for home care needs following discharge   - Consider OT consult to assist with ADL evaluation and planning for discharge  - Provide patient education as appropriate  Outcome: Progressing  Goal: Maintains/Returns to pre admission functional level  Description: INTERVENTIONS:  - Perform AM-PAC 6 Click Basic Mobility/ Daily Activity assessment daily.  - Set and communicate daily mobility goal to care team and patient/family/caregiver.   - Collaborate with rehabilitation services on mobility goals if consulted  - Perform Range of Motion 3 times a day.  - Reposition patient every 2 hours.  - Dangle patient 3 times a day  - Stand patient 3 times a day  - Ambulate patient 3 times a day  - Out of bed to chair 3 times a day   - Out of bed for meals 3 times a day  - Out of bed for toileting  - Record patient progress and toleration of activity level   Outcome: Progressing     Problem: RESPIRATORY - ADULT  Goal: Achieves optimal ventilation and oxygenation  Description: INTERVENTIONS:  - Assess for changes in respiratory status  - Assess for changes in mentation and behavior  - Position to facilitate oxygenation and minimize respiratory effort  - Oxygen administered by appropriate delivery if ordered  - Initiate smoking cessation education as indicated  - Encourage broncho-pulmonary hygiene including cough, deep breathe, Incentive Spirometry  - Assess the need for suctioning and aspirate as needed  - Assess and instruct to report SOB or any respiratory difficulty  - Respiratory Therapy support as indicated  Outcome: Progressing

## 2024-02-15 NOTE — ASSESSMENT & PLAN NOTE
History of lower leg edema. POA +3 bilateral lower leg edema noted per patient was noted to be worsened  Patient takes furosemide 20 mg daily at home  Given 40 mg IV lasix x1, good responds b/l swelling improved  UO-800ml  Echo follow-up  Obtain medical records, patient is a poor historian   Monitor daily weights and I's and O's

## 2024-02-15 NOTE — PLAN OF CARE
Problem: SAFETY ADULT  Goal: Patient will remain free of falls  Description: INTERVENTIONS:  - Educate patient/family on patient safety including physical limitations  - Instruct patient to call for assistance with activity   - Consult OT/PT to assist with strengthening/mobility   - Keep Call bell within reach  - Keep bed low and locked with side rails adjusted as appropriate  - Keep care items and personal belongings within reach  - Initiate and maintain comfort rounds  - Make Fall Risk Sign visible to staff  - Offer Toileting every 2 Hours, in advance of need  - Initiate/Maintain bed alarm  Apply yellow socks and bracelet for high fall risk patients  - Consider moving patient to room near nurses station  Outcome: Progressing

## 2024-02-15 NOTE — ASSESSMENT & PLAN NOTE
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 Recommendation is for follow-up low radiation dose chest CT in one year   Discussed with patient and patient's sister.

## 2024-02-15 NOTE — CONSULTS
Pulmonary Consultation   Neeraj Palacios 73 y.o. male MRN: [id number]  Unit/Bed#: [address] [place] [address] Encounter: [id number]      Reason for consultation: Abnormal CT chest: superimposed mixed ground glass and alveolar opacities. Infectious etiology. Concern for interstitial lung disease     Requesting physician: Mikel     Impression:     Acute hypoxemic respiratory failure in the setting of multifocal pneumonia.  May be community-acquired versus healthcare associated.  Has placed on nasal cannula and now stable.  Being treated with ceftriaxone and azithromycin, RVP, procalcitonin, urine antigens negative  Probable ILD, no specific pattern on CT, no anabelle bronchiectasis, reticular pattern or fibrosis.  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  Although he does not walk at baseline  History of prostate cancer treated with surgery and now unable to walk  Ambulatory dysfunction due to history of prostate cancer surgery  Ex-smoker quit around 2000    Recommendation:    Urine antigens negative, okay to stop azithromycin  Continue ceftriaxone for 1 week   No inhalers required as of now  If he is slow progress to improve then may need to consider short course of low-dose prednisone  Follow sputum culture if able to be obtained  Wean oxygen to keep saturation above 88%  He has no significant respiratory symptoms prior to his current episode of pneumonia.  Would not aggressively pursue ILD workup.  This can be rediscussed in the outpatient setting if patient chooses.      History of Present Illness   HPI:      73-year-old gentleman admitted to the medical service due to respiratory failure.  Past medical history of prostate cancer, hypertension.  Admitted from a nursing home found to be hypoxic complaining of a cough placed on oxygen.    Patient  states is a family history of lung cancer, most of his family were smokers.  He lives with his wife who was a smoker.  He was on and off with smoking as well up to a pack per day, quit in 2000.  He used to work as a , , , served in the [employer] and was exposed to significant occupational inhalants.  Aside from that he has a history of prostate cancer and had surgery and since then has not been able to ambulate therefore requiring a wheelchair.    Reviewed epic, there is no history of patient coming here or in Care Everywhere.  So far during his hospitalization he was placed on ceftriaxone and azithromycin, CT PE protocol showed emphysema, mild groundglass and parenchymal infiltrates, no significant mediastinal adenopathy.  He feels like he is slowly improving during his hospitalization.  Working with physical therapy as well and tolerating diet    Review of systems:  12 point review of systems was completed and was otherwise negative except as listed in HPI.      Historical Information   Past Medical History:   Diagnosis Date    GERD without esophagitis     Hypertension     Hypothyroid     Prostate CA (HCC)      History reviewed. No pertinent surgical history.  History reviewed. No pertinent family history.    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, inhalants working with jet engines  Pets: Denies  Travel: Not recently  Social History:  lives in a assisted facility    Meds/Allergies   Current Facility-Administered Medications   Medication Dose Route Frequency    acetaminophen (TYLENOL) tablet 650 mg  650 mg Oral Q6H PRN    azithromycin (ZITHROMAX) tablet 500 mg  500 mg Oral Q24H    cefTRIAXone (ROCEPHIN) IVPB (premix in dextrose) 1,000 mg 50 mL  1,000 mg Intravenous Q24H    docusate sodium (COLACE) capsule 100 mg  100 mg Oral BID    famotidine (PEPCID) tablet 20 mg  20 mg Oral Daily    heparin (porcine) subcutaneous injection 5,000 Units  5,000 Units  "Subcutaneous Q8H BOBBI    levothyroxine tablet 88 mcg  88 mcg Oral Early Morning    NIFEdipine (PROCARDIA XL) 24 hr tablet 30 mg  30 mg Oral Daily    oxybutynin (DITROPAN-XL) 24 hr tablet 10 mg  10 mg Oral Daily    senna (SENOKOT) tablet 8.6 mg  1 tablet Oral Daily     Medications Prior to Admission   Medication    DULoxetine (CYMBALTA) 30 mg delayed release capsule    famotidine (PEPCID) 20 mg tablet    furosemide (LASIX) 20 mg tablet    Levothyroxine Sodium 88 MCG CAPS    Mirabegron ER (Myrbetriq) 50 MG TB24    NIFEdipine (Procardia XL) 30 mg 24 hr tablet    potassium chloride (Klor-Con) 10 mEq tablet     No Known Allergies    Vitals: Blood pressure 131/82, pulse 66, temperature 98 °F (36.7 °C), temperature source Oral, resp. rate 17, height 6' 3\" (1.905 m), weight 107 kg (235 lb 14.3 oz), SpO2 91%.,  Body mass index is 29.48 kg/m².      Intake/Output Summary (Last 24 hours) at 2/15/2024 1031  Last data filed at 2/14/2024 1401  Gross per 24 hour   Intake 200 ml   Output 1000 ml   Net -800 ml       Physical Exam  General:  Awake alert and oriented x 3, conversant without conversational dyspnea, NAD  HEENT:  PERRL, no nasal drainage, Mucous membranes, moist  NECK: Trachea midline, JVP not elevated  CARDIAC: Reg, s1/S2  PULM: Coarse rhonchi bilaterally no wheezing  CHEST: No gross deformities, equal chest expansion on inspiration bilaterally  ABD: Normoactive bowel sounds, soft nontender, nondistended, no rebound  EXT: No cyanosis, no clubbing, normal capillary refill, no edema   SKIN:  No rashes, no lesions  NEURO: no focal neurologic deficits, AAOx3    Labs: I have personally reviewed pertinent lab results.    Imaging and other studies: I have personally reviewed pertinent films in PACS    Pulmonary function testing:NA    EKG, Pathology, and Other Studies: I have personally reviewed pertinent reports.        Ximena Kennedy MD  Pulmonary & Critical Care Medicine     "

## 2024-02-15 NOTE — ASSESSMENT & PLAN NOTE
"POA w/ lethargy, feeling weak, having a \"coughing fits\" from [facility] living  Hypoxic upon arrival 84% on room air, placed on 4 L with resolution now saturations are greater than 95%  Not on oxygen at baseline. No recent sick contacts, denies fever or chills  No leukocytosis & afebrile.. Negative for COVID Flu RSV  CT scan of chest without contrast:   Superimposed mixed groundglass and alveolar opacities bilaterally appear asymmetric. Findings suggest infectious etiology. Pulmonary edema also possible but less likely. If there is continued concern for interstitial lung disease, would recommend  high-resolution CT scan after the patient's clinical condition improves as an outpatient.  Give 1 dose of IV 40 mg of Lasix with good response  UO -800 ml  Started Antbx based on CT results:   Iv Rocephin 1000 mg q 24 hours (d 2)   Azithromycin 500 mg q 24 hours (d 2)  Consult Pulm appreciate input  Pulmonary hygiene  Obtain sputum culture f/u   Urine strep & pneumo - negative   Titrate O2  saturation >90%, currently on 4 L NC.  Monitor CBC with differential and fever curve  "

## 2024-02-15 NOTE — NURSING NOTE
Pt's oxygen stats checked after changing sheets and laying flat, stats noted to have decreased to the low 80s and oxygen liters were immediatly increased from 4L to 8L. Stats came back up rather slowly. Once stats were steadily in the 90s patient was put back to 4 L. RN came into re-evaluate oxygen levels moments late and oxygen was noted to be off patient. When RN asks why O2 was off, pt stated it fell off and he didn't want to reach down and put back on. Patients stats in 70s without oxygen. Pt a bit winded, but able to speak. RN then used hydrocell to keep NC in place and stats went back to the mid 90s. Hospitalist aware.

## 2024-02-15 NOTE — PHYSICAL THERAPY NOTE
PHYSICAL THERAPY EVALUATION/TREATMENT     02/15/24 1115   PT Last Visit   PT Visit Date 02/15/24   Note Type   Note type Evaluation   Pain Assessment   Pain Assessment Tool Aquino-Baker FACES   Aquino-Baker FACES Pain Rating 2  (abdominal pain/nausea)   Restrictions/Precautions   Other Precautions Chair Alarm;Bed Alarm;Cognitive;Fall Risk   Home Living   Type of Home Assisted living  ([facility])   Bathroom Equipment Shower chair   Home Equipment Walker;Wheelchair-manual   Additional Comments patient is a questionable historian, patient states completing mostly transfers to  only prior to admission   Prior Function   Level of [place] Needs assistance with ADLs;Needs assistance with IADLS;Needs assistance with functional mobility   Lives With Facility staff   Receives Help From Personal care attendant   IADLs Family/Friend/Other provides transportation;Family/Friend/Other provides meals;Family/Friend/Other provides medication management   Comments patient is a resident of [facility]   Additional Pertinent History Chart reviewed, patient admitted with acute respiratory failure with hypoxia.  Patient is from an assisted living facility now requiring assist of 2 persons for standing and transfers.   Family/Caregiver Present No   Cognition   Overall Cognitive Status Impaired   Arousal/Participation Cooperative   Attention Attends with cues to redirect   Orientation Level Oriented to person   Following Commands Follows one step commands with increased time or repetition   Subjective   Subjective Patient states that coughing causes nausea and fear of vomiting   RLE Assessment   RLE Assessment   (Range of motion within functional limits, strength 3+/5)   LLE Assessment   LLE Assessment   (Range of motion within functional limits, strength 3+/5)   Coordination   Movements are Fluid and Coordinated 0   Coordination and Movement Description Decreased coordination with transfers and gait activity   Bed Mobility    Supine to Sit 3  Moderate assistance   Additional items Assist x 2;Verbal cues;LE management   Sit to Supine 3  Moderate assistance   Additional items Assist x 2;Verbal cues;LE management   Transfers   Sit to Stand 3  Moderate assistance   Additional items Assist x 2;Verbal cues   Stand to Sit 3  Moderate assistance   Additional items Assist x 2;Verbal cues   Ambulation/Elevation   Gait Assistance 3  Moderate assist   Additional items Assist x 2;Verbal cues;Tactile cues   Assistive Device Rolling walker   Distance 2-3 steps at bedside with assist for weight shifting and verbal cueing for upright standing.  Patient presents with forward flexed posture head down, shortened stride length and shuffling type gait pattern at this time   Balance   Static Sitting Fair   Dynamic Sitting Fair -   Static Standing Poor   Dynamic Standing Poor -   Ambulatory Poor -   Activity Tolerance   Activity Tolerance Patient limited by fatigue;Treatment limited secondary to medical complications (Comment)   Nurse Made Aware Yes   Assessment   Prognosis Good   Problem List Decreased strength;Decreased range of motion;Decreased endurance;Impaired balance;Decreased mobility;Decreased coordination;Decreased cognition   Assessment Patient seen for Physical Therapy evaluation. Patient admitted with Acute respiratory failure with hypoxia (HCC).  Comorbidities affecting patient's physical performance include: .  Personal factors affecting patient at time of initial evaluation include: ambulating with assistive device, inability to ambulate household distances, inability to navigate community distances, inability to navigate level surfaces without external assistance, inability to perform dynamic tasks in community, limited home support, positive fall history, inability to perform physical activity, inability to perform ADLS, and inability to perform IADLS . Prior to admission, patient was requiring assist for functional mobility with janiceker,  requiring assist for ADLS, requiring assist for IADLS, and an assisted living resident.  Please find objective findings from Physical Therapy assessment regarding body systems outlined above with impairments and limitations including weakness, decreased ROM, impaired balance, decreased endurance, impaired coordination, gait deviations, pain, decreased activity tolerance, decreased functional mobility tolerance, and fall risk.  The Barthel Index was used as a functional outcome tool presenting with a score of Barthel Index Score: 30 today indicating marked limitations of functional mobility and ADLS.  Patient's clinical presentation is currently unstable/unpredictable as seen in patient's presentation of vital sign response, changing level of pain, varying levels of cognitive performance, increased fall risk, new onset of impairment of functional mobility, decreased endurance, and new onset of weakness. Pt would benefit from continued Physical Therapy treatment to address deficits as defined above and maximize level of functional mobility. As demonstrated by objective findings, the assigned level of complexity for this evaluation is high.The patient's -[facility] Basic Mobility Inpatient Short Form Raw Score is 6. A Raw score of less than or equal to 16 suggests the patient may benefit from discharge to post-acute rehabilitation services. Please also refer to the recommendation of the Physical Therapist for safe discharge planning.   Goals   Patient Goals To get stronger and stop coughing   STG Expiration Date 02/22/24   Short Term Goal #1 Transfers and gait with roller walker with mod assist of 1   Short Term Goal #2 Gait endurance to 10 feet   LTG Expiration Date 02/29/24   Long Term Goal #1 Stand pivot transfers with roller walker with min assist of 1   Long Term Goal #2 Increase lower extremity strength to 4 -/5   Plan   Treatment/Interventions ADL retraining;Functional transfer training;LE  strengthening/ROM;Therapeutic exercise;Endurance training;Patient/family training;Equipment eval/education;Bed mobility;Gait training;Compensatory technique education   PT Frequency Other (Comment)  (5 times per week)   Discharge Recommendation   Rehab Resource Intensity Level, PT II (Moderate Resource Intensity)   AM-PAC Basic Mobility Inpatient   Turning in Flat Bed Without Bedrails 1   Lying on Back to Sitting on Edge of Flat Bed Without Bedrails 1   Moving Bed to Chair 1   Standing Up From Chair Using Arms 1   Walk in Room 1   Climb 3-5 Stairs With Railing 1   Basic Mobility Inpatient Raw Score 6   Turning Head Towards Sound 4   Follow Simple Instructions 2   Low Function Basic Mobility Raw Score  12   Low Function Basic Mobility Standardized Score  18.33   Highest Level Of Mobility   -HL Goal 2: Bed activities/Dependent transfer   -HL Achieved 5: Stand (1 or more minutes)   Barthel Index   Feeding 5   Bathing 0   Grooming Score 0   Dressing Score 5   Bladder Score 0   Bowels Score 10   Toilet Use Score 5   Transfers (Bed/Chair) Score 5   Mobility (Level Surface) Score 0   Stairs Score 0   Barthel Index Score 30   Additional Treatment Session   Start Time 1100   End Time 1115   Treatment Assessment s: Patient reports feeling nauseated with coughing and fatigueO: Bilateral lower extremity exercise completed as listed below A: Patient will benefit from continued physical therapy with progression as tolerated and increasing functional mobility with clinical staff as well   Exercises   Hip Flexion Sitting;10 reps;Bilateral  (Alternating)   Hip Abduction Sitting;10 reps;Bilateral  (Alternating)   Knee AROM Long Arc Quad Sitting;10 reps;Bilateral  (Alternating)   Ankle Pumps Sitting;10 reps;Bilateral  (Alternating)   Marching Standing;10 reps;Bilateral   Licensure   [place] License Number  Giselle Sainz PT 4 0 [id number]

## 2024-02-15 NOTE — PROGRESS NOTES
"[facility]  Progress Note  Name: Neeraj Palacios I  MRN: [id number]  Unit/Bed#: [address] [place] [address] I Date of Admission: 2/14/2024   Date of Service: 2/15/2024 I Hospital Day: 1    Assessment/Plan   * Acute respiratory failure with hypoxia (HCC)  Assessment & Plan  POA w/ lethargy, feeling weak, having a \"coughing fits\" from [facility] living  Hypoxic upon arrival 84% on room air, placed on 4 L with resolution now saturations are greater than 95%  Not on oxygen at baseline. No recent sick contacts, denies fever or chills  No leukocytosis & afebrile.. Negative for COVID Flu RSV  CT scan of chest without contrast:   Superimposed mixed groundglass and alveolar opacities bilaterally appear asymmetric. Findings suggest infectious etiology. Pulmonary edema also possible but less likely. If there is continued concern for interstitial lung disease, would recommend  high-resolution CT scan after the patient's clinical condition improves as an outpatient.  Give 1 dose of IV 40 mg of Lasix with good response  UO -800 ml  Started Antbx based on CT results:   Iv Rocephin 1000 mg q 24 hours (d 2)   Azithromycin 500 mg q 24 hours (d 2)  Consult Pulm appreciate input  Pulmonary hygiene  Obtain sputum culture f/u   Urine strep & pneumo - negative   Titrate O2  saturation >90%, currently on 4 L NC.  Monitor CBC with differential and fever curve    Swelling of lower leg  Assessment & Plan  History of lower leg edema. POA +3 bilateral lower leg edema noted per patient was noted to be worsened  Patient takes furosemide 20 mg daily at home  Given 40 mg IV lasix x1, good responds b/l swelling improved  UO-800ml  Echo follow-up  Obtain medical records, patient is a poor historian   Monitor daily weights and I's and O's      Hypertension  Assessment & Plan  History of on Procardia 30 mg daily  Monitor BP  Continue Procardia 30 mg daily      Overactive bladder  Assessment & Plan  Chronic. 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 Recommendation is for follow-up low radiation dose chest CT in one year   Discussed with patient and patient's sister.     Balance problem  Assessment & Plan  Wheelchair bound at [place]  Sister consider of worsening mobility  PT/OT consulted           VTE Pharmacologic Prophylaxis:   High Risk (Score >/= 5) - Pharmacological DVT Prophylaxis Ordered: heparin. Sequential Compression Devices Ordered.    Mobility:   Basic Mobility Inpatient Raw Score: 12  -HLM Goal: 4: Move to chair/commode  JH-HLM Achieved: 2: Bed activities/Dependent transfer  HLM Goal NOT achieved. Continue with multidisciplinary rounding and encourage appropriate mobility to improve upon HLM goals.    Patient Centered Rounds: I performed bedside rounds with nursing staff today.   Discussions with Specialists or Other Care Team Provider: nursing, , &pulm    Education and Discussions with Family / Patient: Updated  (wife and sister) via phone.    Total Time Spent on Date of Encounter in care of patient: 35 mins. This time was spent on one or more of the following: performing physical exam; counseling and coordination of care; obtaining or reviewing history; documenting in the medical record; reviewing/ordering tests, medications or procedures; communicating with other healthcare professionals and discussing with patient's family/caregivers.    Current Length of Stay: 1 day(s)  Current Patient Status: Inpatient   Certification Statement: The patient will continue to require additional inpatient hospital stay due to treatment of acute resp failure requiring supplemental O2 and IV antbxs.   Discharge Plan: Anticipate discharge in 24-48 hrs to rehab facility.    Code Status: Level 1 - Full  Code    Subjective:   Patient is up in bed eating breakfast.  Currently denies shortness of breath and palpitations.  States that he is feeling much better than yesterday especially with his breathing.  Offers no other complaints at this time  Objective:     Vitals:   Temp (24hrs), Av.2 °F (36.8 °C), Min:97.8 °F (36.6 °C), Max:98.8 °F (37.1 °C)    Temp:  [97.8 °F (36.6 °C)-98.8 °F (37.1 °C)] 98 °F (36.7 °C)  HR:  [60-82] 66  Resp:  [17-24] 17  BP: (122-133)/(73-85) 131/82  SpO2:  [91 %-99 %] 91 %  Body mass index is 29.48 kg/m².     Input and Output Summary (last 24 hours):     Intake/Output Summary (Last 24 hours) at 2/15/2024 1054  Last data filed at 2024 1401  Gross per 24 hour   Intake 200 ml   Output 1000 ml   Net -800 ml       Physical Exam:   Physical Exam  Vitals and nursing note reviewed.   Constitutional:       General: He is not in acute distress.     Appearance: He is well-developed.   HENT:      Head: Normocephalic and atraumatic.   Eyes:      Conjunctiva/sclera: Conjunctivae normal.   Cardiovascular:      Rate and Rhythm: Normal rate and regular rhythm.      Heart sounds: No murmur heard.  Pulmonary:      Effort: Pulmonary effort is normal.      Breath sounds: Examination of the right-lower field reveals rales. Examination of the left-lower field reveals rales. Rales present.   Abdominal:      Palpations: Abdomen is soft.      Tenderness: There is no abdominal tenderness.   Musculoskeletal:         General: Swelling present.      Cervical back: Neck supple.      Right lower le+ Pitting Edema present.      Left lower le+ Pitting Edema present.   Skin:     General: Skin is warm and dry.      Capillary Refill: Capillary refill takes less than 2 seconds.   Neurological:      Mental Status: He is alert.   Psychiatric:         Mood and Affect: Mood normal.          Additional Data:     Labs:  Results from last 7 days   Lab Units 02/15/24  0501   WBC Thousand/uL 6.27   HEMOGLOBIN g/dL 15.3    HEMATOCRIT % 46.3   PLATELETS Thousands/uL 218   NEUTROS PCT % 55   LYMPHS PCT % 17   MONOS PCT % 12   EOS PCT % 15*     Results from last 7 days   Lab Units 02/15/24  0501 02/14/24  0515   SODIUM mmol/L 139 138   POTASSIUM mmol/L 3.6 4.3   CHLORIDE mmol/L 106 105   CO2 mmol/L 23 25   BUN mg/dL 14 13   CREATININE mg/dL 0.82 0.90   ANION GAP mmol/L 10 8   CALCIUM mg/dL 9.2 9.3   ALBUMIN g/dL  --  4.1   TOTAL BILIRUBIN mg/dL  --  0.78   ALK PHOS U/L  --  67   ALT U/L  --  8   AST U/L  --  22   GLUCOSE RANDOM mg/dL 78 104                 Results from last 7 days   Lab Units 02/14/24  0515   LACTIC ACID mmol/L 1.7   PROCALCITONIN ng/ml 0.05       Lines/Drains:  Invasive Devices       Peripheral Intravenous Line  Duration             Peripheral IV 02/14/24 Left Antecubital 1 day                          Imaging: No pertinent imaging reviewed.    Recent Cultures (last 7 days):   Results from last 7 days   Lab Units 02/14/24  1809 02/14/24  0516 02/14/24  0515   BLOOD CULTURE   --  No Growth at 24 hrs. No Growth at 24 hrs.   LEGIONELLA URINARY ANTIGEN  Negative  --   --        Last 24 Hours Medication List:   Current Facility-Administered Medications   Medication Dose Route Frequency Provider Last Rate    acetaminophen  650 mg Oral Q6H PRN Sofia ROXY Jorgensen      azithromycin  500 mg Oral Q24H SofiaROXY Dubon      cefTRIAXone  1,000 mg Intravenous Q24H Sofia A ROXY Valenzuela 1,000 mg (02/14/24 1803)    docusate sodium  100 mg Oral BID Sofia A ROXY Valenzuela      famotidine  20 mg Oral Daily Sofia A ROXY Valenzuela      furosemide  20 mg Oral Daily Sofia A ROXY Valenzuela      heparin (porcine)  5,000 Units Subcutaneous Q8H [facility] Sofia A ROXY Valenzuela      levothyroxine  88 mcg Oral Early Morning Sofia A ROXY Valenzuela      NIFEdipine  30 mg Oral Daily Sofia A ROXY Valenzuela      oxybutynin  10 mg Oral Daily Sofia A ROXY Valenzuela      senna  1 tablet Oral Daily ROXY Wright           Today, Patient Was Seen By: ROXY Wright    **Please Note: This note may have been constructed using a voice recognition system.**

## 2024-02-16 ENCOUNTER — APPOINTMENT (INPATIENT)
Dept: RADIOLOGY | Facility: HOSPITAL | Age: 74
DRG: 196 | End: 2024-02-16
Payer: MEDICARE

## 2024-02-16 LAB
ANION GAP SERPL CALCULATED.3IONS-SCNC: 8 MMOL/L
AORTIC ROOT: 3.9 CM
APICAL FOUR CHAMBER EJECTION FRACTION: 75 %
AV LVOT PEAK GRADIENT: 6 MMHG
AV PEAK GRADIENT: 6 MMHG
BASOPHILS # BLD AUTO: 0.04 THOUSANDS/ÂΜL (ref 0–0.1)
BASOPHILS NFR BLD AUTO: 1 % (ref 0–1)
BSA FOR ECHO PROCEDURE: 2.35 M2
BUN SERPL-MCNC: 13 MG/DL (ref 5–25)
CALCIUM SERPL-MCNC: 8.7 MG/DL (ref 8.4–10.2)
CHLORIDE SERPL-SCNC: 104 MMOL/L (ref 96–108)
CO2 SERPL-SCNC: 24 MMOL/L (ref 21–32)
CREAT SERPL-MCNC: 0.85 MG/DL (ref 0.6–1.3)
DOP CALC LVOT AREA: 3.8 CM2
DOP CALC LVOT DIAMETER: 2.2 CM
E WAVE DECELERATION TIME: 245 MS
E/A RATIO: 0.91
EOSINOPHIL # BLD AUTO: 0.9 THOUSAND/ÂΜL (ref 0–0.61)
EOSINOPHIL NFR BLD AUTO: 12 % (ref 0–6)
ERYTHROCYTE [DISTWIDTH] IN BLOOD BY AUTOMATED COUNT: 13.5 % (ref 11.6–15.1)
FRACTIONAL SHORTENING: 36 (ref 28–44)
GFR SERPL CREATININE-BSD FRML MDRD: 85 ML/MIN/1.73SQ M
GLUCOSE SERPL-MCNC: 156 MG/DL (ref 65–140)
GLUCOSE SERPL-MCNC: 82 MG/DL (ref 65–140)
HCT VFR BLD AUTO: 42.7 % (ref 36.5–49.3)
HGB BLD-MCNC: 14.8 G/DL (ref 12–17)
IMM GRANULOCYTES # BLD AUTO: 0.01 THOUSAND/UL (ref 0–0.2)
IMM GRANULOCYTES NFR BLD AUTO: 0 % (ref 0–2)
INTERVENTRICULAR SEPTUM IN DIASTOLE (PARASTERNAL SHORT AXIS VIEW): 1.1 CM
INTERVENTRICULAR SEPTUM: 1.1 CM (ref 0.6–1.1)
LAAS-AP2: 23.1 CM2
LAAS-AP4: 18.7 CM2
LEFT ATRIUM SIZE: 3.8 CM
LEFT ATRIUM VOLUME (MOD BIPLANE): 58 ML
LEFT ATRIUM VOLUME INDEX (MOD BIPLANE): 24.6 ML/M2
LEFT INTERNAL DIMENSION IN SYSTOLE: 2.7 CM (ref 2.1–4)
LEFT VENTRICULAR INTERNAL DIMENSION IN DIASTOLE: 4.2 CM (ref 3.5–6)
LEFT VENTRICULAR POSTERIOR WALL IN END DIASTOLE: 1.2 CM
LEFT VENTRICULAR STROKE VOLUME: 53 ML
LVSV (TEICH): 53 ML
LYMPHOCYTES # BLD AUTO: 1.01 THOUSANDS/ÂΜL (ref 0.6–4.47)
LYMPHOCYTES NFR BLD AUTO: 14 % (ref 14–44)
MCH RBC QN AUTO: 30.6 PG (ref 26.8–34.3)
MCHC RBC AUTO-ENTMCNC: 34.7 G/DL (ref 31.4–37.4)
MCV RBC AUTO: 88 FL (ref 82–98)
MONOCYTES # BLD AUTO: 0.86 THOUSAND/ÂΜL (ref 0.17–1.22)
MONOCYTES NFR BLD AUTO: 12 % (ref 4–12)
MRSA NOSE QL CULT: NORMAL
MV E'TISSUE VEL-LAT: 10 CM/S
MV E'TISSUE VEL-SEP: 6 CM/S
MV PEAK A VEL: 0.69 M/S
MV PEAK E VEL: 63 CM/S
MV STENOSIS PRESSURE HALF TIME: 71 MS
MV VALVE AREA P 1/2 METHOD: 3.1
NEUTROPHILS # BLD AUTO: 4.62 THOUSANDS/ÂΜL (ref 1.85–7.62)
NEUTS SEG NFR BLD AUTO: 61 % (ref 43–75)
NRBC BLD AUTO-RTO: 0 /100 WBCS
PLATELET # BLD AUTO: 218 THOUSANDS/UL (ref 149–390)
PMV BLD AUTO: 9.4 FL (ref 8.9–12.7)
POTASSIUM SERPL-SCNC: 3.7 MMOL/L (ref 3.5–5.3)
RA PRESSURE ESTIMATED: 8 MMHG
RBC # BLD AUTO: 4.83 MILLION/UL (ref 3.88–5.62)
RIGHT ATRIUM AREA SYSTOLE A4C: 14.8 CM2
RIGHT VENTRICLE ID DIMENSION: 3.3 CM
RV PSP: 61 MMHG
SL CV LEFT ATRIUM LENGTH A2C: 5.8 CM
SL CV LV EF: 55
SL CV PED ECHO LEFT VENTRICLE DIASTOLIC VOLUME (MOD BIPLANE) 2D: 81 ML
SL CV PED ECHO LEFT VENTRICLE SYSTOLIC VOLUME (MOD BIPLANE) 2D: 28 ML
SODIUM SERPL-SCNC: 136 MMOL/L (ref 135–147)
TR MAX PG: 53 MMHG
TR PEAK VELOCITY: 3.6 M/S
TRICUSPID ANNULAR PLANE SYSTOLIC EXCURSION: 1.8 CM
TRICUSPID VALVE PEAK REGURGITATION VELOCITY: 3.63 M/S
WBC # BLD AUTO: 7.44 THOUSAND/UL (ref 4.31–10.16)

## 2024-02-16 PROCEDURE — 94760 N-INVAS EAR/PLS OXIMETRY 1: CPT

## 2024-02-16 PROCEDURE — 70496 CT ANGIOGRAPHY HEAD: CPT

## 2024-02-16 PROCEDURE — 70498 CT ANGIOGRAPHY NECK: CPT

## 2024-02-16 PROCEDURE — 80048 BASIC METABOLIC PNL TOTAL CA: CPT

## 2024-02-16 PROCEDURE — 99232 SBSQ HOSP IP/OBS MODERATE 35: CPT

## 2024-02-16 PROCEDURE — 82948 REAGENT STRIP/BLOOD GLUCOSE: CPT

## 2024-02-16 PROCEDURE — 99232 SBSQ HOSP IP/OBS MODERATE 35: CPT | Performed by: STUDENT IN AN ORGANIZED HEALTH CARE EDUCATION/TRAINING PROGRAM

## 2024-02-16 PROCEDURE — 94640 AIRWAY INHALATION TREATMENT: CPT

## 2024-02-16 PROCEDURE — 85025 COMPLETE CBC W/AUTO DIFF WBC: CPT

## 2024-02-16 PROCEDURE — 94664 DEMO&/EVAL PT USE INHALER: CPT

## 2024-02-16 RX ORDER — METHYLPREDNISOLONE SODIUM SUCCINATE 40 MG/ML
40 INJECTION, POWDER, LYOPHILIZED, FOR SOLUTION INTRAMUSCULAR; INTRAVENOUS EVERY 12 HOURS SCHEDULED
Status: DISCONTINUED | OUTPATIENT
Start: 2024-02-16 | End: 2024-02-24

## 2024-02-16 RX ORDER — IPRATROPIUM BROMIDE AND ALBUTEROL SULFATE 2.5; .5 MG/3ML; MG/3ML
3 SOLUTION RESPIRATORY (INHALATION)
Status: DISCONTINUED | OUTPATIENT
Start: 2024-02-16 | End: 2024-02-18

## 2024-02-16 RX ORDER — ATORVASTATIN CALCIUM 40 MG/1
40 TABLET, FILM COATED ORAL EVERY EVENING
Status: DISCONTINUED | OUTPATIENT
Start: 2024-02-16 | End: 2024-02-25 | Stop reason: HOSPADM

## 2024-02-16 RX ORDER — ASPIRIN 325 MG
325 TABLET ORAL ONCE
Status: COMPLETED | OUTPATIENT
Start: 2024-02-16 | End: 2024-02-16

## 2024-02-16 RX ORDER — ASPIRIN 325 MG
325 TABLET ORAL DAILY
Status: DISCONTINUED | OUTPATIENT
Start: 2024-02-16 | End: 2024-02-16

## 2024-02-16 RX ORDER — ASPIRIN 81 MG/1
81 TABLET, CHEWABLE ORAL DAILY
Status: DISCONTINUED | OUTPATIENT
Start: 2024-02-17 | End: 2024-02-25 | Stop reason: HOSPADM

## 2024-02-16 RX ADMIN — HEPARIN SODIUM 5000 UNITS: 5000 INJECTION INTRAVENOUS; SUBCUTANEOUS at 21:52

## 2024-02-16 RX ADMIN — NIFEDIPINE 30 MG: 30 TABLET, EXTENDED RELEASE ORAL at 10:42

## 2024-02-16 RX ADMIN — HEPARIN SODIUM 5000 UNITS: 5000 INJECTION INTRAVENOUS; SUBCUTANEOUS at 13:56

## 2024-02-16 RX ADMIN — HEPARIN SODIUM 5000 UNITS: 5000 INJECTION INTRAVENOUS; SUBCUTANEOUS at 06:25

## 2024-02-16 RX ADMIN — ATORVASTATIN CALCIUM 40 MG: 40 TABLET, FILM COATED ORAL at 20:10

## 2024-02-16 RX ADMIN — FAMOTIDINE 20 MG: 20 TABLET, FILM COATED ORAL at 10:42

## 2024-02-16 RX ADMIN — METHYLPREDNISOLONE SODIUM SUCCINATE 40 MG: 40 INJECTION, POWDER, FOR SOLUTION INTRAMUSCULAR; INTRAVENOUS at 13:56

## 2024-02-16 RX ADMIN — LEVOTHYROXINE SODIUM 88 MCG: 88 TABLET ORAL at 06:26

## 2024-02-16 RX ADMIN — ASPIRIN 325 MG: 325 TABLET ORAL at 20:09

## 2024-02-16 RX ADMIN — CEFTRIAXONE 1000 MG: 1 INJECTION, SOLUTION INTRAVENOUS at 14:09

## 2024-02-16 RX ADMIN — IOHEXOL 85 ML: 350 INJECTION, SOLUTION INTRAVENOUS at 18:38

## 2024-02-16 RX ADMIN — IPRATROPIUM BROMIDE AND ALBUTEROL SULFATE 3 ML: 2.5; .5 SOLUTION RESPIRATORY (INHALATION) at 19:32

## 2024-02-16 RX ADMIN — FUROSEMIDE 20 MG: 20 TABLET ORAL at 10:42

## 2024-02-16 RX ADMIN — DOCUSATE SODIUM 100 MG: 100 CAPSULE, LIQUID FILLED ORAL at 20:10

## 2024-02-16 RX ADMIN — IPRATROPIUM BROMIDE AND ALBUTEROL SULFATE 3 ML: 2.5; .5 SOLUTION RESPIRATORY (INHALATION) at 16:06

## 2024-02-16 RX ADMIN — SENNOSIDES 8.6 MG: 8.6 TABLET, FILM COATED ORAL at 10:42

## 2024-02-16 RX ADMIN — OXYBUTYNIN CHLORIDE 10 MG: 5 TABLET, EXTENDED RELEASE ORAL at 10:42

## 2024-02-16 RX ADMIN — DOCUSATE SODIUM 100 MG: 100 CAPSULE, LIQUID FILLED ORAL at 10:42

## 2024-02-16 NOTE — ASSESSMENT & PLAN NOTE
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 Recommendation is for follow-up low radiation dose chest CT in one year   Discussed with patient and patient's sister about performing follow up CT

## 2024-02-16 NOTE — ASSESSMENT & PLAN NOTE
History of lower leg edema. POA +3 bilateral lower leg edema noted per patient was noted to be worsened  Patient takes furosemide 20 mg daily at home  Given 40 mg IV lasix x1, good responds b/l swelling improved  UO-800ml  Echo: EF 55% with normal systolic function  Obtain medical records, patient is a poor historian spoke to his sisters about obtaining medical records   Sister Renetta is POA and gives permission for records to be obtained  Monitor daily weights and I's and O's

## 2024-02-16 NOTE — PROGRESS NOTES
"Pulmonary Progress Note   Neeraj Palacios 73 y.o. male MRN: [id number]    Unit/Bed#: [address] [place] [address] Encounter: [id number]          Subjective:   Patient seen and examined.  No significant events overnight.     He continues to have a distressing cough but no significant chest pain shortness of breath nausea vomiting.    Physical Exam:    GEN: alert and oriented x 3, pleasant and cooperative   HEENT:  Normocephalic, atraumatic, anicteric  NECK: No JVD  HEART: Rate, normal S1 and S2  LUNGS: Mild rhonchi bilaterally, no wheezing  ABDOMEN:  Normoactive bowel sounds, soft, no tenderness, no distention  EXTREMITIES: peripheral pulses palpable; no edema  NEURO: no gross focal findings; cranial nerves grossly intact   SKIN:  Dry, intact, warm to touch    Vitals: Blood pressure 119/69, pulse 68, temperature 98.5 °F (36.9 °C), temperature source Oral, resp. rate 18, height 6' 3\" (1.905 m), weight 104 kg (228 lb 12.8 oz), SpO2 93%., Body mass index is 28.6 kg/m².,   Orthostatic Blood Pressures      Flowsheet Row Most Recent Value   Blood Pressure 119/69 filed at 02/16/2024 1040   Patient Position - Orthostatic VS Sitting filed at 02/16/2024 1040              Intake/Output Summary (Last 24 hours) at 2/16/2024 1356  Last data filed at 2/16/2024 0634  Gross per 24 hour   Intake 240 ml   Output 1500 ml   Net -1260 ml       Labs & Results:      Results from last 7 days   Lab Units 02/16/24  0500 02/15/24  0501 02/14/24  0515   POTASSIUM mmol/L 3.7 3.6 4.3   CO2 mmol/L 24 23 25   CHLORIDE mmol/L 104 106 105   BUN mg/dL 13 14 13   CREATININE mg/dL 0.85 0.82 0.90     Results from last 7 days   Lab Units 02/16/24  0500 02/15/24  0501 02/14/24  0515   HEMOGLOBIN g/dL 14.8 15.3 16.7   HEMATOCRIT % 42.7 46.3 49.3   PLATELETS Thousands/uL 218 218 209       Imaging:  I have personally reviewed pertinent films in PACS    Micro: Reviewed      I have performed an independent review and interpreted all relevant images in PACS, labs, pathology and " external records relevant to patient care     Assessment:    Acute hypoxemic respiratory failure now on 4 L nasal cannula in the setting of suspected commune acquired pneumonia.  Comes from a healthcare facility, has negative urine antigens so far and now only on ceftriaxone.  He is an ex-smoker and has emphysema on CT, he still requiring 4 L nasal cannula therefore would be reasonable to consider prednisone burst  Probable ILD, no specific pattern, did have significant occupational exposures.  He is not currently in flare  COPD unclassified, CT with emphysema, mMRC 1 not on inhalers.  Has not improving slowly on antibiotics, would start prednisone  Ambulatory dysfunction due to prostate cancer surgery  History of prostate cancer treated with surgery  Ex-smoker quit around 2000    Plan:    Start IV steroids today and depending on the response we can tailor to p.o.  Continue ceftriaxone  Start DuoNebs QID  Keep oxygen saturations above 88%  No ILD workup while inpatient    Ximena Kennedy MD  Pulmonary and Critical Care Medicine

## 2024-02-16 NOTE — QUICK NOTE
Stroke alert called:  Neurology response immediate  LKW: 1700  NIHSS 2 for mild facial droop and dysarthria already improving    CT H stat not acute  CTA H/N no LVO reviewed in PACS and with radiology    TNKtPA: no - low nihss rapidly improving symptoms  Suspect acute lacunar infarct vs recrudescence of remote right cerebellar infarct in the setting of hypotension/ PNA  Recommend stroke protocol  Optimize BP: SBP at least 140-210 max permissive HTN  ASA full dose x 1, then 81 mg daily  Atorvastatin  MRI brain routine Telemetry  ECHO cardiogram    D/w primary team at time of alert

## 2024-02-16 NOTE — PROGRESS NOTES
"Formerly [facility], later [facility]  Progress Note  Name: Neeraj Palacios I  MRN: [id number]  Unit/Bed#: [address] [place] [address] I Date of Admission: 2/14/2024   Date of Service: 2/16/2024 I Hospital Day: 2    Assessment/Plan   * Acute respiratory failure with hypoxia (HCC)  Assessment & Plan  POA w/ lethargy, feeling weak, having a \"coughing fits\" from [facility] living  Hypoxic upon arrival 84% on room air, placed on 4 L with resolution now saturations are greater than 95%  Not on oxygen at baseline. No recent sick contacts, denies fever or chills  No leukocytosis & afebrile.. Negative for COVID Flu RSV  CT scan of chest without contrast:   Superimposed mixed groundglass and alveolar opacities bilaterally appear asymmetric. Findings suggest infectious etiology. Pulmonary edema also possible but less likely. If there is continued concern for interstitial lung disease, would recommend  high-resolution CT scan after the patient's clinical condition improves as an outpatient.  Give 1 dose of IV 40 mg of Lasix with good response  UO -800 ml  Started Antbx based on CT results:   Iv Rocephin 1000 mg q 24 hours (d 3)   Azithromycin 500 mg q 24 hours -stopped   Pulm following, recs as follows  Start IV steroids depending on response can tailor to p.o.  DuoNeb 4 times daily   No ILD workup as an inpatient  Pulmonary hygiene  Obtain sputum culture f/u   MRSA negative  Urine strep & pneumo - negative   Titrate O2  saturation >88%, currently on 4 L NC.  Monitor CBC with differential and fever curve    Swelling of lower leg  Assessment & Plan  History of lower leg edema. POA +3 bilateral lower leg edema noted per patient was noted to be worsened  Patient takes furosemide 20 mg daily at home  Given 40 mg IV lasix x1, good responds b/l swelling improved  UO-800ml  Echo: EF 55% with normal systolic function  Obtain medical records, patient is a poor historian spoke to his sisters about obtaining medical records   Monitor daily weights " and I's and O's      Hypertension  Assessment & Plan  History of on Procardia 30 mg daily  Monitor BP  Continue Procardia 30 mg daily      Overactive bladder  Assessment & Plan  Chronic. On Myrbetriq ER 50 mg daily switch to oxybutynin 10 mg  Monitor I's and O's  Obtain PVR  Urinary retention protocol    Aortic ectasia (HCC)  Assessment & Plan  CT chest w/o contrast: Fusiform ectasia of the ascending thoracic aorta measuring up to 44 mm. Recommendation is for follow-up low radiation dose chest CT in one year   Discussed with patient and patient's sister about performing follow up CT      Balance problem  Assessment & Plan  Wheelchair bound at [facility]  Sister consider of worsening mobility  PT/OT consulted           VTE Pharmacologic Prophylaxis:   Moderate Risk (Score 3-4) - Pharmacological DVT Prophylaxis Ordered: heparin.    Mobility:   Basic Mobility Inpatient Raw Score: 6  JH-HLM Goal: 2: Bed activities/Dependent transfer  JH-HLM Achieved: 2: Bed activities/Dependent transfer  HLM Goal NOT achieved. Continue with multidisciplinary rounding and encourage appropriate mobility to improve upon HLM goals.    Patient Centered Rounds: I performed bedside rounds with nursing staff today.   Discussions with Specialists or Other Care Team Provider: Pulmonary, case management, nursing    Education and Discussions with Family / Patient: Updated  (sister) via phone.    Total Time Spent on Date of Encounter in care of patient: 35 mins. This time was spent on one or more of the following: performing physical exam; counseling and coordination of care; obtaining or reviewing history; documenting in the medical record; reviewing/ordering tests, medications or procedures; communicating with other healthcare professionals and discussing with patient's family/caregivers.    Current Length of Stay: 2 day(s)  Current Patient Status: Inpatient   Certification Statement: The patient will continue to require additional  inpatient hospital stay due to IV antibiotics and steroids due to infected CAP  Discharge Plan: Anticipate discharge in 24-48 hrs to discharge location to be determined pending rehab evaluations.    Code Status: Level 1 - Full Code    Subjective:   Patient up in bed and waiting breakfast.  Complaining of cough but denies shortness of breath, palpitations, chest pain, nausea &vomiting  and no diarrhea  Objective:     Vitals:   Temp (24hrs), Av.7 °F (37.1 °C), Min:98.4 °F (36.9 °C), Max:99.1 °F (37.3 °C)    Temp:  [98.4 °F (36.9 °C)-99.1 °F (37.3 °C)] 98.4 °F (36.9 °C)  HR:  [68-74] 72  Resp:  [18-19] 18  BP: (100-120)/(68-74) 120/74  SpO2:  [91 %-98 %] 98 %  Body mass index is 28.6 kg/m².     Input and Output Summary (last 24 hours):     Intake/Output Summary (Last 24 hours) at 2024 1838  Last data filed at 2024 0634  Gross per 24 hour   Intake --   Output 900 ml   Net -900 ml       Physical Exam:   Physical Exam  Vitals and nursing note reviewed.   Constitutional:       General: He is not in acute distress.     Appearance: He is well-developed.   HENT:      Head: Normocephalic and atraumatic.   Eyes:      Conjunctiva/sclera: Conjunctivae normal.   Cardiovascular:      Rate and Rhythm: Normal rate and regular rhythm.      Heart sounds: No murmur heard.  Pulmonary:      Effort: Pulmonary effort is normal. No respiratory distress.      Breath sounds: Examination of the right-middle field reveals rales. Examination of the left-middle field reveals rales. Examination of the right-lower field reveals rales. Examination of the left-lower field reveals rales. Rhonchi and rales present.      Comments: 4 Liter NC  Abdominal:      General: Bowel sounds are normal.      Palpations: Abdomen is soft.      Tenderness: There is no abdominal tenderness.   Musculoskeletal:         General: Swelling present.      Cervical back: Neck supple.      Right lower le+ Edema present.      Left lower le+ Edema present.    Skin:     General: Skin is warm and dry.      Capillary Refill: Capillary refill takes less than 2 seconds.   Neurological:      Mental Status: He is alert.   Psychiatric:         Mood and Affect: Mood normal.        Additional Data:     Labs:  Results from last 7 days   Lab Units 02/16/24  0500   WBC Thousand/uL 7.44   HEMOGLOBIN g/dL 14.8   HEMATOCRIT % 42.7   PLATELETS Thousands/uL 218   NEUTROS PCT % 61   LYMPHS PCT % 14   MONOS PCT % 12   EOS PCT % 12*     Results from last 7 days   Lab Units 02/16/24  0500 02/15/24  0501 02/14/24  0515   SODIUM mmol/L 136   < > 138   POTASSIUM mmol/L 3.7   < > 4.3   CHLORIDE mmol/L 104   < > 105   CO2 mmol/L 24   < > 25   BUN mg/dL 13   < > 13   CREATININE mg/dL 0.85   < > 0.90   ANION GAP mmol/L 8   < > 8   CALCIUM mg/dL 8.7   < > 9.3   ALBUMIN g/dL  --   --  4.1   TOTAL BILIRUBIN mg/dL  --   --  0.78   ALK PHOS U/L  --   --  67   ALT U/L  --   --  8   AST U/L  --   --  22   GLUCOSE RANDOM mg/dL 82   < > 104    < > = values in this interval not displayed.         Results from last 7 days   Lab Units 02/16/24  1803   POC GLUCOSE mg/dl 156*         Results from last 7 days   Lab Units 02/14/24  0515   LACTIC ACID mmol/L 1.7   PROCALCITONIN ng/ml 0.05       Lines/Drains:  Invasive Devices       Peripheral Intravenous Line  Duration             Peripheral IV 02/14/24 Left Antecubital 2 days                          Imaging: Reviewed radiology reports from this admission including: ECHO    Recent Cultures (last 7 days):   Results from last 7 days   Lab Units 02/14/24  1809 02/14/24  0516 02/14/24  0515   BLOOD CULTURE   --  No Growth at 48 hrs. No Growth at 48 hrs.   LEGIONELLA URINARY ANTIGEN  Negative  --   --        Last 24 Hours Medication List:   Current Facility-Administered Medications   Medication Dose Route Frequency Provider Last Rate    acetaminophen  650 mg Oral Q6H PRN ROXY Wright      cefTRIAXone  1,000 mg Intravenous Q24H ROXY Wright  1,000 mg (02/16/24 1409)    docusate sodium  100 mg Oral BID ROXY Wright      famotidine  20 mg Oral Daily ROXY Wright      furosemide  20 mg Oral Daily ROXY Wright      heparin (porcine)  5,000 Units Subcutaneous Q8H [facility] ROXY Wright      ipratropium-albuterol  3 mL Nebulization Q6H Ximena Kennedy MD      levothyroxine  88 mcg Oral Early Morning ROXY Wright      methylPREDNISolone sodium succinate  40 mg Intravenous Q12H [facility] Ximena Kenndey MD      NIFEdipine  30 mg Oral Daily ROXY Wright      oxybutynin  10 mg Oral Daily ROXY Wright      senna  1 tablet Oral Daily ROXY Wright          Today, Patient Was Seen By: ROXY Wright    **Please Note: This note may have been constructed using a voice recognition system.**

## 2024-02-16 NOTE — CASE MANAGEMENT
Case Management Assessment & Discharge Planning Note    Patient name Neeraj Palacios  Location [address] [address]/[address] [place] [address]-* MRN [id number]  : 1950 Date 2024       Current Admission Date: 2024  Current Admission Diagnosis:Acute respiratory failure with hypoxia (HCC)   Patient Active Problem List    Diagnosis Date Noted    Acute respiratory failure with hypoxia (HCC) 2024    Hypertension 2024    Swelling of lower leg 2024    Overactive bladder 2024    Balance problem 2024    Aortic ectasia (HCC) 2024      LOS (days): 2  Geometric Mean LOS (GMLOS) (days): 4.1  Days to GMLOS:1.8     OBJECTIVE:    Risk of Unplanned Readmission Score: 8.31         Current admission status: Inpatient     Preferred Pharmacy:   [facility] [address]  [address] [facility] [address]  Phone: [phone] Fax: [phone]    Primary Care Provider: Riky Herrmann PA-C    Primary Insurance: MEDICARE  Secondary Insurance:     ASSESSMENT:  Active Health Care Proxies    There are no active Health Care Proxies on file.       Readmission Root Cause  30 Day Readmission: No    Patient Information  Admitted from:: Other (comment) ([facility])  Mental Status: Alert  During Assessment patient was accompanied by: Sister  Assessment information provided by:: Sister  Primary Caregiver: Family  Caregiver's Name:: Akila Locke (sister)  Caregiver's Relationship to Patient:: Family Member  Caregiver's Telephone Number:: [phone]  Support Systems: Family members  County of Residence: [place]  What city do you live in?: [place]  Type of Current Residence: Other (Comment) (Assisted Living)    Activities of Daily Living Prior to Admission  Functional Status: Assistance  Completes ADLs independently?: No  Level of ADL dependence: Assistance  Ambulates independently?: No  Level of ambulatory dependence: Assistance  Does patient use assisted devices?: Yes  Assisted Devices  (DME) used: Wheelchair  Does patient currently own DME?: Yes  What DME does the patient currently own?: Wheelchair  Does patient have a history of Outpatient Therapy (PT/OT)?: No  Does the patient have a history of Short-Term Rehab?: Yes ([facility] rehab)  Does patient have a history of HHC?: No  Does patient currently have HHC?: No     Patient Information Continued  Income Source: Pension/long-term  Does patient have prescription coverage?: No     Means of Transportation  Means of Transport to [facility]:: Family transport      Social Determinants of Health (SDOH)      Flowsheet Row Most Recent Value   Housing Stability    In the last 12 months, was there a time when you were not able to pay the mortgage or rent on time? N   In the last 12 months, how many places have you lived? 1   In the last 12 months, was there a time when you did not have a steady place to sleep or slept in a shelter (including now)? N   Transportation Needs    In the past 12 months, has lack of transportation kept you from medical appointments or from getting medications? no   In the past 12 months, has lack of transportation kept you from meetings, work, or from getting things needed for daily living? No   Food Insecurity    Within the past 12 months, you worried that your food would run out before you got the money to buy more. Never true   Within the past 12 months, the food you bought just didn't last and you didn't have money to get more. Never true   Utilities    In the past 12 months has the electric, gas, oil, or water company threatened to shut off services in your home? No            DISCHARGE DETAILS:    Discharge planning discussed with:: Patient and sister Akila  Freedom of Choice: Yes  Comments - Freedom of Choice: D/C to [facility] close to [facility] contacted family/caregiver?: Yes  Were Treatment Team discharge recommendations reviewed with patient/caregiver?: Yes  Did patient/caregiver verbalize understanding of patient care  needs?: Yes  Were patient/caregiver advised of the risks associated with not following Treatment Team discharge recommendations?: Yes    Contacts  Patient Contacts: Akila Locke (sister)  Relationship to Patient:: Family  Contact Method: In Person  Reason/Outcome: Discharge Planning    Requested Home Health Care         Is the patient interested in [facility] at discharge?: No     Other Referral/Resources/Interventions Provided:  Interventions: Short Term Rehab  Referral Comments: CM met with patient and sister Akila at bedside, introduced self and role, complete assessment and discharge planning. Akila stated that patient has been living at [facility] since last 1 year and prior to that he was living with his wife in [place]. His wife passed away last year. Patient is wheelchair bound and requires assistance with alds and iadls. Akila is POA # 2, lives in [place] almost an hr away from the hospital and other sister Renetta who is POA #1 lives in [place]. Discussed STR per therapy recs, both patient and Akila agreeable and Akila voiced prefeence for facility close to [place] so she can visit  as she has their mother staying with her who is on hospice. CM amde Akila aware that CM will send referral accordingly and will reach out to her with list of accepting facilities for her review. Akila agreeable. CM sent referral in [facility] to [facility] within 20 miles of [place], awaiting response.     Treatment Team Recommendation: Short Term Rehab  Discharge Destination Plan:: Short Term Rehab  Transport at Discharge : Other (Comment) ([facility] vs [facility] pending O2 needs at D/C)

## 2024-02-16 NOTE — ASSESSMENT & PLAN NOTE
"POA w/ lethargy, feeling weak, having a \"coughing fits\" from [facility] living  Hypoxic upon arrival 84% on room air, placed on 4 L with resolution now saturations are greater than 95%  Not on oxygen at baseline. No recent sick contacts, denies fever or chills  No leukocytosis & afebrile.. Negative for COVID Flu RSV  CT scan of chest without contrast:   Superimposed mixed groundglass and alveolar opacities bilaterally appear asymmetric. Findings suggest infectious etiology. Pulmonary edema also possible but less likely. If there is continued concern for interstitial lung disease, would recommend  high-resolution CT scan after the patient's clinical condition improves as an outpatient.  Give 1 dose of IV 40 mg of Lasix with good response  UO -800 ml  Started Antbx based on CT results:   Iv Rocephin 1000 mg q 24 hours (d 3)   Azithromycin 500 mg q 24 hours -stopped   Pulm following, recs as follows  Start IV steroids depending on response can tailor to p.o.  DuoNeb 4 times daily   No ILD workup as an inpatient  Pulmonary hygiene  Obtain sputum culture f/u   MRSA negative  Urine strep & pneumo - negative   Titrate O2  saturation >88%, currently on 4 L NC.  Monitor CBC with differential and fever curve  "

## 2024-02-16 NOTE — QUICK NOTE
Called to the room by the nursing.  Patient was eating dinner and suspected that he choked on salad he was eating.  Upon arrival to the room patient oxygen saturations increased now on 6 L of nasal cannula with saturations 88 to 89%, from 4 L nasal cannula with saturations greater then 90.  He was coughing and able to verbalize that he choked on salad while in bed eating dinner.  Noted bilateral Rales in the bases, poor voice quality and appears to tachypnea.  During assessment of patient,  noticed a left facial droop.  Different than patients baseline..  Rapid response called.  ICU bedside and stroke alert called. Baseline NIHH scale obtained by ICU attendin. Patient did return back to baseline with resolution of facial droop.  Patient taken to CT head and CTA per Neurology.  Level care changed to SD2.  Obtain chest x-rayfor possible aspiration.  Family is made aware all questions answered.

## 2024-02-17 ENCOUNTER — APPOINTMENT (INPATIENT)
Dept: RADIOLOGY | Facility: HOSPITAL | Age: 74
DRG: 196 | End: 2024-02-17
Payer: MEDICARE

## 2024-02-17 PROBLEM — E03.9 HYPOTHYROIDISM: Status: ACTIVE | Noted: 2024-02-17

## 2024-02-17 PROBLEM — R29.90 STROKE-LIKE EPISODE: Status: ACTIVE | Noted: 2024-02-17

## 2024-02-17 LAB
ALBUMIN SERPL BCP-MCNC: 3.6 G/DL (ref 3.5–5)
ALP SERPL-CCNC: 58 U/L (ref 34–104)
ALT SERPL W P-5'-P-CCNC: 8 U/L (ref 7–52)
ANION GAP SERPL CALCULATED.3IONS-SCNC: 9 MMOL/L
AST SERPL W P-5'-P-CCNC: 18 U/L (ref 13–39)
BILIRUB SERPL-MCNC: 0.56 MG/DL (ref 0.2–1)
BUN SERPL-MCNC: 18 MG/DL (ref 5–25)
CALCIUM SERPL-MCNC: 9.2 MG/DL (ref 8.4–10.2)
CHLORIDE SERPL-SCNC: 106 MMOL/L (ref 96–108)
CHOLEST SERPL-MCNC: 169 MG/DL
CO2 SERPL-SCNC: 22 MMOL/L (ref 21–32)
CREAT SERPL-MCNC: 0.87 MG/DL (ref 0.6–1.3)
CRP SERPL QL: 75.2 MG/L
ERYTHROCYTE [DISTWIDTH] IN BLOOD BY AUTOMATED COUNT: 13 % (ref 11.6–15.1)
EST. AVERAGE GLUCOSE BLD GHB EST-MCNC: 94 MG/DL
GFR SERPL CREATININE-BSD FRML MDRD: 85 ML/MIN/1.73SQ M
GLUCOSE SERPL-MCNC: 131 MG/DL (ref 65–140)
HBA1C MFR BLD: 4.9 %
HCT VFR BLD AUTO: 45.8 % (ref 36.5–49.3)
HDLC SERPL-MCNC: 48 MG/DL
HGB BLD-MCNC: 15.2 G/DL (ref 12–17)
LDLC SERPL CALC-MCNC: 110 MG/DL (ref 0–100)
MCH RBC QN AUTO: 30.1 PG (ref 26.8–34.3)
MCHC RBC AUTO-ENTMCNC: 33.2 G/DL (ref 31.4–37.4)
MCV RBC AUTO: 91 FL (ref 82–98)
PLATELET # BLD AUTO: 236 THOUSANDS/UL (ref 149–390)
PMV BLD AUTO: 9.2 FL (ref 8.9–12.7)
POTASSIUM SERPL-SCNC: 4.6 MMOL/L (ref 3.5–5.3)
PROCALCITONIN SERPL-MCNC: 0.05 NG/ML
PROT SERPL-MCNC: 7.2 G/DL (ref 6.4–8.4)
RBC # BLD AUTO: 5.05 MILLION/UL (ref 3.88–5.62)
SODIUM SERPL-SCNC: 137 MMOL/L (ref 135–147)
TRIGL SERPL-MCNC: 53 MG/DL
WBC # BLD AUTO: 6.14 THOUSAND/UL (ref 4.31–10.16)

## 2024-02-17 PROCEDURE — 85027 COMPLETE CBC AUTOMATED: CPT | Performed by: INTERNAL MEDICINE

## 2024-02-17 PROCEDURE — 99232 SBSQ HOSP IP/OBS MODERATE 35: CPT | Performed by: INTERNAL MEDICINE

## 2024-02-17 PROCEDURE — 71045 X-RAY EXAM CHEST 1 VIEW: CPT

## 2024-02-17 PROCEDURE — 84145 PROCALCITONIN (PCT): CPT | Performed by: INTERNAL MEDICINE

## 2024-02-17 PROCEDURE — 70551 MRI BRAIN STEM W/O DYE: CPT

## 2024-02-17 PROCEDURE — 80061 LIPID PANEL: CPT | Performed by: NURSE PRACTITIONER

## 2024-02-17 PROCEDURE — G0427 INPT/ED TELECONSULT70: HCPCS | Performed by: PSYCHIATRY & NEUROLOGY

## 2024-02-17 PROCEDURE — 83036 HEMOGLOBIN GLYCOSYLATED A1C: CPT | Performed by: NURSE PRACTITIONER

## 2024-02-17 PROCEDURE — 99232 SBSQ HOSP IP/OBS MODERATE 35: CPT | Performed by: STUDENT IN AN ORGANIZED HEALTH CARE EDUCATION/TRAINING PROGRAM

## 2024-02-17 PROCEDURE — 94760 N-INVAS EAR/PLS OXIMETRY 1: CPT

## 2024-02-17 PROCEDURE — 86140 C-REACTIVE PROTEIN: CPT | Performed by: STUDENT IN AN ORGANIZED HEALTH CARE EDUCATION/TRAINING PROGRAM

## 2024-02-17 PROCEDURE — 94640 AIRWAY INHALATION TREATMENT: CPT

## 2024-02-17 PROCEDURE — 80053 COMPREHEN METABOLIC PANEL: CPT | Performed by: INTERNAL MEDICINE

## 2024-02-17 RX ORDER — GUAIFENESIN/DEXTROMETHORPHAN 100-10MG/5
10 SYRUP ORAL EVERY 4 HOURS PRN
Status: DISCONTINUED | OUTPATIENT
Start: 2024-02-17 | End: 2024-02-25 | Stop reason: HOSPADM

## 2024-02-17 RX ORDER — BENZONATATE 100 MG/1
200 CAPSULE ORAL 3 TIMES DAILY
Status: DISCONTINUED | OUTPATIENT
Start: 2024-02-17 | End: 2024-02-25 | Stop reason: HOSPADM

## 2024-02-17 RX ORDER — FUROSEMIDE 10 MG/ML
40 INJECTION INTRAMUSCULAR; INTRAVENOUS ONCE
Status: COMPLETED | OUTPATIENT
Start: 2024-02-17 | End: 2024-02-17

## 2024-02-17 RX ADMIN — NIFEDIPINE 30 MG: 30 TABLET, EXTENDED RELEASE ORAL at 08:10

## 2024-02-17 RX ADMIN — FUROSEMIDE 40 MG: 10 INJECTION, SOLUTION INTRAMUSCULAR; INTRAVENOUS at 15:36

## 2024-02-17 RX ADMIN — OXYBUTYNIN CHLORIDE 10 MG: 5 TABLET, EXTENDED RELEASE ORAL at 08:10

## 2024-02-17 RX ADMIN — HEPARIN SODIUM 5000 UNITS: 5000 INJECTION INTRAVENOUS; SUBCUTANEOUS at 22:12

## 2024-02-17 RX ADMIN — HEPARIN SODIUM 5000 UNITS: 5000 INJECTION INTRAVENOUS; SUBCUTANEOUS at 14:19

## 2024-02-17 RX ADMIN — BENZONATATE 200 MG: 100 CAPSULE ORAL at 22:12

## 2024-02-17 RX ADMIN — IPRATROPIUM BROMIDE AND ALBUTEROL SULFATE 3 ML: 2.5; .5 SOLUTION RESPIRATORY (INHALATION) at 07:17

## 2024-02-17 RX ADMIN — BENZONATATE 200 MG: 100 CAPSULE ORAL at 15:36

## 2024-02-17 RX ADMIN — METHYLPREDNISOLONE SODIUM SUCCINATE 40 MG: 40 INJECTION, POWDER, FOR SOLUTION INTRAMUSCULAR; INTRAVENOUS at 00:14

## 2024-02-17 RX ADMIN — CEFTRIAXONE 1000 MG: 1 INJECTION, SOLUTION INTRAVENOUS at 14:19

## 2024-02-17 RX ADMIN — ASPIRIN 81 MG 81 MG: 81 TABLET ORAL at 08:10

## 2024-02-17 RX ADMIN — IPRATROPIUM BROMIDE AND ALBUTEROL SULFATE 3 ML: 2.5; .5 SOLUTION RESPIRATORY (INHALATION) at 02:16

## 2024-02-17 RX ADMIN — SENNOSIDES 8.6 MG: 8.6 TABLET, FILM COATED ORAL at 08:10

## 2024-02-17 RX ADMIN — IPRATROPIUM BROMIDE AND ALBUTEROL SULFATE 3 ML: 2.5; .5 SOLUTION RESPIRATORY (INHALATION) at 13:18

## 2024-02-17 RX ADMIN — ATORVASTATIN CALCIUM 40 MG: 40 TABLET, FILM COATED ORAL at 17:56

## 2024-02-17 RX ADMIN — IPRATROPIUM BROMIDE AND ALBUTEROL SULFATE 3 ML: 2.5; .5 SOLUTION RESPIRATORY (INHALATION) at 19:39

## 2024-02-17 RX ADMIN — METHYLPREDNISOLONE SODIUM SUCCINATE 40 MG: 40 INJECTION, POWDER, FOR SOLUTION INTRAMUSCULAR; INTRAVENOUS at 08:09

## 2024-02-17 RX ADMIN — HEPARIN SODIUM 5000 UNITS: 5000 INJECTION INTRAVENOUS; SUBCUTANEOUS at 05:07

## 2024-02-17 RX ADMIN — LEVOTHYROXINE SODIUM 88 MCG: 88 TABLET ORAL at 05:07

## 2024-02-17 RX ADMIN — FUROSEMIDE 20 MG: 20 TABLET ORAL at 08:10

## 2024-02-17 RX ADMIN — GUAIFENESIN AND DEXTROMETHORPHAN 10 ML: 100; 10 SYRUP ORAL at 17:56

## 2024-02-17 RX ADMIN — DOCUSATE SODIUM 100 MG: 100 CAPSULE, LIQUID FILLED ORAL at 08:10

## 2024-02-17 RX ADMIN — FAMOTIDINE 20 MG: 20 TABLET, FILM COATED ORAL at 08:10

## 2024-02-17 RX ADMIN — METHYLPREDNISOLONE SODIUM SUCCINATE 40 MG: 40 INJECTION, POWDER, FOR SOLUTION INTRAMUSCULAR; INTRAVENOUS at 22:12

## 2024-02-17 RX ADMIN — DOCUSATE SODIUM 100 MG: 100 CAPSULE, LIQUID FILLED ORAL at 17:56

## 2024-02-17 NOTE — PLAN OF CARE
Problem: Prexisting or High Potential for Compromised Skin Integrity  Goal: Skin integrity is maintained or improved  Description: INTERVENTIONS:  - Identify patients at risk for skin breakdown  - Assess and monitor skin integrity  - Assess and monitor nutrition and hydration status  - Monitor labs   - Assess for incontinence   - Turn and reposition patient  - Assist with mobility/ambulation  - Relieve pressure over bony prominences  - Avoid friction and shearing  - Provide appropriate hygiene as needed including keeping skin clean and dry  - Evaluate need for skin moisturizer/barrier cream  - Collaborate with interdisciplinary team   - Patient/family teaching  - Consider wound care consult   Outcome: Progressing     Problem: SAFETY ADULT  Goal: Patient will remain free of falls  Description: INTERVENTIONS:  - Educate patient/family on patient safety including physical limitations  - Instruct patient to call for assistance with activity   - Consult OT/PT to assist with strengthening/mobility   - Keep Call bell within reach  - Keep bed low and locked with side rails adjusted as appropriate  - Keep care items and personal belongings within reach  - Initiate and maintain comfort rounds  - Make Fall Risk Sign visible to staff  - Offer Toileting every 2 Hours, in advance of need  - Initiate/Maintain bed alarm  - Obtain necessary fall risk management equipment: n/a  - Apply yellow socks and bracelet for high fall risk patients  - Consider moving patient to room near nurses station  Outcome: Progressing  Goal: Maintain or return to baseline ADL function  Description: INTERVENTIONS:  -  Assess patient's ability to carry out ADLs; assess patient's baseline for ADL function and identify physical deficits which impact ability to perform ADLs (bathing, care of mouth/teeth, toileting, grooming, dressing, etc.)  - Assess/evaluate cause of self-care deficits   - Assess range of motion  - Assess patient's mobility; develop plan  if impaired  - Assess patient's need for assistive devices and provide as appropriate  - Encourage maximum independence but intervene and supervise when necessary  - Involve family in performance of ADLs  - Assess for home care needs following discharge   - Consider OT consult to assist with ADL evaluation and planning for discharge  - Provide patient education as appropriate  Outcome: Progressing  Goal: Maintains/Returns to pre admission functional level  Description: INTERVENTIONS:  - Perform AM-PAC 6 Click Basic Mobility/ Daily Activity assessment daily.  - Set and communicate daily mobility goal to care team and patient/family/caregiver.   - Collaborate with rehabilitation services on mobility goals if consulted  - Perform Range of Motion 3 times a day.  - Reposition patient every 2 hours.  - Dangle patient 3 times a day  - Stand patient 3 times a day  - Ambulate patient 3 times a day  - Out of bed to chair 3 times a day   - Out of bed for meals 3 times a day  - Out of bed for toileting  - Record patient progress and toleration of activity level   Outcome: Progressing     Problem: RESPIRATORY - ADULT  Goal: Achieves optimal ventilation and oxygenation  Description: INTERVENTIONS:  - Assess for changes in respiratory status  - Assess for changes in mentation and behavior  - Position to facilitate oxygenation and minimize respiratory effort  - Oxygen administered by appropriate delivery if ordered  - Initiate smoking cessation education as indicated  - Encourage broncho-pulmonary hygiene including cough, deep breathe, Incentive Spirometry  - Assess the need for suctioning and aspirate as needed  - Assess and instruct to report SOB or any respiratory difficulty  - Respiratory Therapy support as indicated  Outcome: Progressing     Problem: NEUROSENSORY - ADULT  Goal: Achieves stable or improved neurological status  Description: INTERVENTIONS  - Monitor and report changes in neurological status  - Monitor vital signs  such as temperature, blood pressure, glucose, and any other labs ordered   - Initiate measures to prevent increased intracranial pressure  - Monitor for seizure activity and implement precautions if appropriate      Outcome: Progressing  Goal: Remains free of injury related to seizures activity  Description: INTERVENTIONS  - Maintain airway, patient safety  and administer oxygen as ordered  - Monitor patient for seizure activity, document and report duration and description of seizure to physician/advanced practitioner  - If seizure occurs,  ensure patient safety during seizure  - Reorient patient post seizure  - Seizure pads on all 4 side rails  - Instruct patient/family to notify RN of any seizure activity including if an aura is experienced  - Instruct patient/family to call for assistance with activity based on nursing assessment  - Administer anti-seizure medications if ordered    Outcome: Progressing  Goal: Achieves maximal functionality and self care  Description: INTERVENTIONS  - Monitor swallowing and airway patency with patient fatigue and changes in neurological status  - Encourage and assist patient to increase activity and self care.   - Encourage visually impaired, hearing impaired and aphasic patients to use assistive/communication devices  Outcome: Progressing     Problem: CARDIOVASCULAR - ADULT  Goal: Maintains optimal cardiac output and hemodynamic stability  Description: INTERVENTIONS:  - Monitor I/O, vital signs and rhythm  - Monitor for S/S and trends of decreased cardiac output  - Administer and titrate ordered vasoactive medications to optimize hemodynamic stability  - Assess quality of pulses, skin color and temperature  - Assess for signs of decreased coronary artery perfusion  - Instruct patient to report change in severity of symptoms  Outcome: Progressing  Goal: Absence of cardiac dysrhythmias or at baseline rhythm  Description: INTERVENTIONS:  - Continuous cardiac monitoring, vital  signs, obtain 12 lead EKG if ordered  - Administer antiarrhythmic and heart rate control medications as ordered  - Monitor electrolytes and administer replacement therapy as ordered  Outcome: Progressing

## 2024-02-17 NOTE — PROGRESS NOTES
"Pulmonary Progress Note   Neeraj Palacios 73 y.o. male MRN: [id number]    Unit/Bed#: [address] [place] [address] Encounter: [id number]          Subjective:   Patient seen and examined.  Overnight events noted, MRI performed for possible stroke found to be negative.  Today he is in good spirits.  He remembers the event fully.    Still hypoxemic requiring 6 L nasal cannula to maintain saturation 90%.    Physical Exam:    GEN: alert and oriented x 3, pleasant and cooperative   HEENT:  Normocephalic, atraumatic, anicteric  NECK: No JVD  HEART: Rate, normal S1 and S2  LUNGS: Mild rhonchi bilaterally, no wheezing  ABDOMEN:  Normoactive bowel sounds, soft, no tenderness, no distention  EXTREMITIES: peripheral pulses palpable; no edema  NEURO: no gross focal findings; cranial nerves grossly intact   SKIN:  Dry, intact, warm to touch    Vitals: Blood pressure 137/80, pulse 60, temperature (!) 97.4 °F (36.3 °C), resp. rate 20, height 6' 3\" (1.905 m), weight 101 kg (223 lb 9.6 oz), SpO2 95%., Body mass index is 27.95 kg/m².,   Orthostatic Blood Pressures      Flowsheet Row Most Recent Value   Blood Pressure 137/80 filed at 02/17/2024 0752   Patient Position - Orthostatic VS Sitting filed at 02/16/2024 1040              Intake/Output Summary (Last 24 hours) at 2/17/2024 1449  Last data filed at 2/17/2024 0501  Gross per 24 hour   Intake --   Output 600 ml   Net -600 ml       Labs & Results:      Results from last 7 days   Lab Units 02/17/24  0457 02/16/24  0500 02/15/24  0501   POTASSIUM mmol/L 4.6 3.7 3.6   CO2 mmol/L 22 24 23   CHLORIDE mmol/L 106 104 106   BUN mg/dL 18 13 14   CREATININE mg/dL 0.87 0.85 0.82     Results from last 7 days   Lab Units 02/17/24  0457 02/16/24  0500 02/15/24  0501   HEMOGLOBIN g/dL 15.2 14.8 15.3   HEMATOCRIT % 45.8 42.7 46.3   PLATELETS Thousands/uL 236 218 218       Imaging:  I have personally reviewed pertinent films in PACS    Micro: Reviewed      I have performed an independent review and interpreted all " relevant images in PACS, labs, pathology and external records relevant to patient care     Assessment:    Acute hypoxemic respiratory failure now on 6 L nasal cannula in the setting of suspected community acquired pneumonia.  Comes from a healthcare facility, has negative urine antigens only on ceftriaxone.  He is an ex-smoker and has emphysema on CT, was placed on Duonebs yesterday and IV steroids, he reports mild improvement from breathing standpoint but also is up on oxygen  Probable ILD, no specific pattern, did have significant occupational exposures.  He is not currently in flare  COPD unclassified, CT with emphysema, mMRC 1 not on inhalers.   Ambulatory dysfunction due to prostate cancer surgery  History of prostate cancer treated with surgery  Ex-smoker quit around 2000    Plan:    One time dose of IV Lasix 40 mg, not sure how reliable his bed weight is   Check CRP  Continue same dose IV steroids for now  Continue ceftriaxone  Continue DuoNebs QID  Keep oxygen saturations above 88%  No ILD workup while inpatient    Ximena Kennedy MD  Pulmonary and Critical Care Medicine

## 2024-02-17 NOTE — CONSULTS
TeleConsultation - Neurology   Neeraj Palacios 73 y.o. male MRN: [id number]  Unit/Bed#: [address] [place] [address] Encounter: [id number]        REQUIRED DOCUMENTATION:     1. This service was provided via Telemedicine.  2. Provider located at [place], [place].  3. TeleMed provider: Norm Ba MD.  4. Identify all parties in room with patient during tele consult:  5.Patient was then informed that this was a Telemedicine visit and that the exam was being conducted confidentially over secure lines. My office door was closed. No one else was in the room.  Patient acknowledged consent and understanding of privacy and security of the Telemedicine visit, and gave us permission to have the assistant stay in the room in order to assist with the history and to conduct the exam.  I informed the patient that I have reviewed their record in Epic and presented the opportunity for them to ask any questions regarding the visit today.  The patient agreed to participate.             Assessment/Plan     73 y.o.  male with a PMH of hypertension, overactive bladder, prostate cancer, who presents with lethargy, headache, uncontrollable coughing from [facility] who was admitted on 2/14 for hypoxia. Patient was stroke alerted for reported mild L facial droop and dysarthria on 2/16. She was evaluated by Dr. Buchanan and determined not a candidate for TNK and thrombectomy. She was loaded with ASA and continued on 81mg daily.   Neuro consulted for this    CTH, No acute intracranial abnormality. Chronic lacunar infarct in R cerebellum. ?NPH  CTA H/N , No significant stenosis of the cervical carotid or vertebral arteries  No significant intracranial stenosis, large vessel occlusion or aneurysm.  Echo, EF 55%, indeterminant diastolic function    Impression  Transient L facial droop/dysarthria under the setting of coughing spell/hypoxia  -unclear whether patient truly had CVA related event or not. However, given chronic lacunar infarct in CTH and CVA  "risk factor, recommend to continue ASA 81mg daily and atorvastatin 40mg daily      Abnormal CTH  -Patient reported balance issue for \"a long time\". Denies urinary incontinence.  Endorse memory impairment.   -CTH/ MRI concern for NPH. please refer to the NPH clinic for further eval  - please check b12, folate, MMA, TSH, RPR and treat accordingly    Neeraj Palacios will need follow up in in 6 weeks with general neurology attending or advance practitioner. He will not require outpatient neurological testing.    History of Present Illness     Reason for Consult / Principal Problem: facial droop/dysarthria  Hx and PE limited by: telemedicine,   HPI: Neeraj Palacios is a 73 y.o.  male with a PMH of hypertension, overactive bladder, prostate cancer, who presents with lethargy, headache, uncontrollable coughing from [facility] who was admitted on 2/14 for hypoxia. Patient was stroke alerted for reported mild L facial droop and dysarthria on 2/16. He was evaluated by Dr. Buchanan and determined not a candidate for TNK and thrombectomy. He was loaded with ASA and continued on 81mg daily.   Neuro consulted for this.    Patient seen and examined through tele. Per chart review, the reported L facial droop and dysarthria were noticed after patient had cough episode and experienced hypoxia and BP drop. Reported NIHSS=2 initially, but rapidly improved to 1 with mild L facial droop.     CTA H/N, no LVO, no significant stenosis  MRI brain wo, no sign of acute stroke,     Inpatient consult to Neurology  Consult performed by: Norm Ba MD  Consult ordered by: ROXY Muniz           Review of Systems  10 system reviewed, unremarkable other than above  Historical Information   Past Medical History:   Diagnosis Date    GERD without esophagitis     Hypertension     Hypothyroid     Prostate CA (HCC)      History reviewed. No pertinent surgical history.  Social History   Social History     Substance and Sexual Activity " "  Alcohol Use Yes    Alcohol/week: 1.0 standard drink of alcohol    Types: 1 Cans of beer per week     Social History     Substance and Sexual Activity   Drug Use Never     E-Cigarette/Vaping    E-Cigarette Use Never User      E-Cigarette/Vaping Substances     Social History     Tobacco Use   Smoking Status Former    Types: Cigarettes   Smokeless Tobacco Never     Family History: non-contributory    Review of previous medical records was  completed.     Meds/Allergies   all current active meds have been reviewed    No Known Allergies    Objective   Vitals:Blood pressure 137/80, pulse 60, temperature (!) 97.4 °F (36.3 °C), resp. rate 20, height 6' 3\" (1.905 m), weight 101 kg (223 lb 9.6 oz), SpO2 95%.,Body mass index is 27.95 kg/m².    Intake/Output Summary (Last 24 hours) at 2/17/2024 1024  Last data filed at 2/17/2024 0501  Gross per 24 hour   Intake --   Output 600 ml   Net -600 ml       Invasive Devices:   Invasive Devices       Peripheral Intravenous Line  Duration             Peripheral IV 02/14/24 Left Antecubital 3 days              Drain  Duration             External Urinary Catheter Medium <1 day                    Physical Exam  Neurologic Exam  GEN: in no acute distress, well-developed, well nourished  HEENT: normocephalic,  Nose and ears grossly normal in appearance.  CV:  no pedal edema.  Normotensive  PULM: airways patent, non-labored breathing   ABD:  Nondistended  EXT: no   edema or erythema.  No joint swelling  SKIN: no rashes or lesions.     NEURO:        Mental Status: Alert and oriented to person, place, and year. Interactive, able to follow commands.  Answers questions appropriately       Speech: Intact Articulation         CN 2-12: grossly intact except for mild L facial asymetry       Motor: can move all extremities symmetrically      Sensory:  Reported intact light touch and pinprick throughout        Reflexes:  Not able to assess during tele visit       Coordination: no ataxia with " finger-to-nose and heel-to-shin testing             Gait/Station: Deferred        Cortical: No Extinction    Lab Results: CBC:   Results from last 7 days   Lab Units 02/17/24  0457 02/16/24  0500 02/15/24  0501   WBC Thousand/uL 6.14 7.44 6.27   RBC Million/uL 5.05 4.83 5.11   HEMOGLOBIN g/dL 15.2 14.8 15.3   HEMATOCRIT % 45.8 42.7 46.3   MCV fL 91 88 91   PLATELETS Thousands/uL 236 218 218   , BMP/CMP:   Results from last 7 days   Lab Units 02/17/24  0457 02/16/24  0500 02/15/24  0501 02/14/24  0515   SODIUM mmol/L 137 136 139 138   POTASSIUM mmol/L 4.6 3.7 3.6 4.3   CHLORIDE mmol/L 106 104 106 105   CO2 mmol/L 22 24 23 25   BUN mg/dL 18 13 14 13   CREATININE mg/dL 0.87 0.85 0.82 0.90   CALCIUM mg/dL 9.2 8.7 9.2 9.3   AST U/L 18  --   --  22   ALT U/L 8  --   --  8   ALK PHOS U/L 58  --   --  67   EGFR ml/min/1.73sq m 85 85 87 83     Imaging Studies: I have personally reviewed pertinent films in PACS  EKG, Pathology, and Other Studies: I have personally reviewed pertinent reports.    VTE Prophylaxis: Heparin    Counseling / Coordination of Care  Total time spent today 41 minutes. Greater than 50% of total time was spent with the patient and / or family counseling and / or coordination of care. A description of the counseling / coordination of care: impression, studies review, follow up

## 2024-02-17 NOTE — OCCUPATIONAL THERAPY NOTE
Occupational Therapy Cancellation Note       02/17/24 1041   Note Type   Note type Cancelled Session   Cancel Reasons Patient off floor/test   Additional Comments Chart review complete. Attempted to see pt for OT session, pt off floor for MRI. Will follow-up as time/schedule permits.   Licensure   [place] License Number  Chhaya Haily OTR/L [id number]

## 2024-02-17 NOTE — ASSESSMENT & PLAN NOTE
History of hypertension hypothyroidism OAB presents from [facility] for cough hypoxia shortness of breath found to have multifocal pneumonia and probable ILD  Pulmonary following.  Multifocal pneumonia: On ceftriaxone.  ILD possible.  On IV methylprednisolone.:  Remains on mid flow oxygen.    Results from last 7 days   Lab Units 02/17/24  0457 02/14/24  0515   PROCALCITONIN ng/ml 0.05 0.05

## 2024-02-17 NOTE — ASSESSMENT & PLAN NOTE
Strokelike episode necessitating rapid response/stroke alert  MRI of brain: No acute CVA.  Marked hydrocephalus.  Seen by neurology.  Symptoms of left facial droop may have been from coughing fit  Neurology recommends continuation of aspirin and atorvastatin

## 2024-02-17 NOTE — PROGRESS NOTES
[facility]  Progress Note  Name: Neeraj Palacios I  MRN: [id number]  Unit/Bed#: [address] 408[address]01 I Date of Admission: 2/14/2024   Date of Service: 2/17/2024 I Hospital Day: 3    Assessment/Plan   * Acute respiratory failure with hypoxia (HCC)  Assessment & Plan  History of hypertension hypothyroidism OAB presents from [facility] living for cough hypoxia shortness of breath found to have multifocal pneumonia and probable ILD  Pulmonary following.  Multifocal pneumonia: On ceftriaxone.  ILD possible.  On IV methylprednisolone.:  Remains on mid flow oxygen.    Results from last 7 days   Lab Units 02/17/24  0457 02/14/24  0515   PROCALCITONIN ng/ml 0.05 0.05       Hypothyroidism  Assessment & Plan  Continue levothyroxine    Stroke-like episode  Assessment & Plan  Strokelike episode necessitating rapid response/stroke alert  MRI of brain: No acute CVA.  Marked hydrocephalus.  Seen by neurology.  Symptoms of left facial droop may have been from coughing fit  Neurology recommends continuation of aspirin and atorvastatin    Aortic ectasia (HCC)  Assessment & Plan  Needs outpatient follow-up    Overactive bladder  Assessment & Plan  Prior to admission on myrbetriq.  Currently on oxybutynin    Swelling of lower leg  Assessment & Plan  Echocardiogram without LV dysfunction.  Remains on furosemide    Hypertension  Assessment & Plan  Stable, continue nifedipine    VTE Pharmacologic Prophylaxis:   Moderate Risk (Score 3-4) - Pharmacological DVT Prophylaxis Ordered: heparin.    Mobility:  Basic Mobility Inpatient Raw Score: 6  JH-HLM Goal: 2: Bed activities/Dependent transfer  JH-HLM Achieved: 2: Bed activities/Dependent transfer  HLM Goal achieved. Continue to encourage appropriate mobility.    Patient Centered Rounds: I have performed bedside rounds with nursing staff today.  Discussions with Specialists or Other Care Team Provider: Case management pulmonology    Education and Discussions with Family /  "Patient: Updated  (sister) via phone.    Time Spent for Care:   This time was spent on one or more of the following: performing physical exam; counseling and coordination of care; obtaining or reviewing history; documenting in the medical record; reviewing/ordering tests, medications or procedures; communicating with other healthcare professionals and discussing with patient's family/caregivers.    Current Length of Stay: 3 day(s)  Current Patient Status: Inpatient   Certification Statement: The patient will continue to require additional inpatient hospital stay due to hypoxic respiratory failure  Discharge Plan: Anticipate discharge in 48-72 hrs to rehab facility.    Code Status: Level 1 - Full Code      Subjective:   Patient seen and examined.  No further neurologic symptoms.  Shortness of breath stable    Objective:   Vitals: Blood pressure 137/80, pulse 60, temperature (!) 97.4 °F (36.3 °C), resp. rate 20, height 6' 3\" (1.905 m), weight 101 kg (223 lb 9.6 oz), SpO2 95%.    Intake/Output Summary (Last 24 hours) at 2/17/2024 1405  Last data filed at 2/17/2024 0501  Gross per 24 hour   Intake --   Output 600 ml   Net -600 ml       Physical Exam  Vitals reviewed.   Constitutional:       General: He is not in acute distress.  HENT:      Head: Atraumatic.   Cardiovascular:      Rate and Rhythm: Regular rhythm.      Heart sounds: Normal heart sounds.   Pulmonary:      Effort: Pulmonary effort is normal.      Breath sounds: Decreased breath sounds present. No wheezing.   Abdominal:      General: Bowel sounds are normal.      Palpations: Abdomen is soft.      Tenderness: There is no abdominal tenderness.   Musculoskeletal:      Right lower leg: Edema present.      Left lower leg: Edema present.   Skin:     General: Skin is warm.   Neurological:      General: No focal deficit present.      Mental Status: He is alert.   Psychiatric:         Mood and Affect: Mood normal.       Additional Data:   Labs:  Results " from last 7 days   Lab Units 02/17/24  0457 02/16/24  0500 02/15/24  0501   WBC Thousand/uL 6.14 7.44 6.27   HEMOGLOBIN g/dL 15.2 14.8 15.3   PLATELETS Thousands/uL 236 218 218   MCV fL 91 88 91     Results from last 7 days   Lab Units 02/17/24  0457 02/16/24  0500 02/15/24  0501 02/14/24  0515   SODIUM mmol/L 137 136 139 138   POTASSIUM mmol/L 4.6 3.7 3.6 4.3   CHLORIDE mmol/L 106 104 106 105   CO2 mmol/L 22 24 23 25   ANION GAP mmol/L 9 8 10 8   BUN mg/dL 18 13 14 13   CREATININE mg/dL 0.87 0.85 0.82 0.90   CALCIUM mg/dL 9.2 8.7 9.2 9.3   ALBUMIN g/dL 3.6  --   --  4.1   TOTAL BILIRUBIN mg/dL 0.56  --   --  0.78   ALK PHOS U/L 58  --   --  67   ALT U/L 8  --   --  8   AST U/L 18  --   --  22   EGFR ml/min/1.73sq m 85 85 87 83   GLUCOSE RANDOM mg/dL 131 82 78 104             Results from last 7 days   Lab Units 02/14/24  0927 02/14/24  0715 02/14/24  0515   HS TNI 0HR ng/L  --   --  13   HS TNI 2HR ng/L  --  14  --    HS TNI 4HR ng/L 13  --   --           Results from last 7 days   Lab Units 02/17/24 0457 02/14/24  0515   LACTIC ACID mmol/L  --  1.7   PROCALCITONIN ng/ml 0.05 0.05     Results from last 7 days   Lab Units 02/16/24  1803   POC GLUCOSE mg/dl 156*     Results from last 7 days   Lab Units 02/17/24 0457   HEMOGLOBIN A1C % 4.9     Results from last 7 days   Lab Units 02/14/24  0927   TSH 3RD GENERATON uIU/mL 4.522*     * I Have Reviewed All Lab Data Listed Above.    Cultures:   Results from last 7 days   Lab Units 02/14/24  1809 02/14/24  0516 02/14/24  0515   BLOOD CULTURE   --  No Growth at 72 hrs. No Growth at 72 hrs.   INFLUENZA A PCR   --   --  Negative   LEGIONELLA URINARY ANTIGEN  Negative  --   --    STREP PNEUMONIAE ANTIGEN, URINE  Negative  --   --        Results from last 7 days   Lab Units 02/14/24  0515   SARS-COV-2  Negative   INFLUENZA A PCR  Negative   INFLUENZA B PCR  Negative   RSV PCR  Negative           Lines/Drains:  Invasive Devices       Peripheral Intravenous Line  Duration              Peripheral IV 02/14/24 Left Antecubital 3 days              Drain  Duration             External Urinary Catheter Medium <1 day                  Telemetry:      Imaging:  Imaging Reports Reviewed Today Include:   MRI brain wo contrast    Result Date: 2/17/2024  Impression: No acute ischemia as clinically questioned. Moderate periventricular white matter change on T2 and FLAIR imaging consistent with chronic microangiopathic change. Marked hydrocephalus involving the lateral ventricles and third ventricle similar to CT scan performed yesterday. 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 Workstation performed: [id number]     CT stroke alert brain    Result Date: 2/16/2024  Impression: No acute intracranial abnormality. Chronic microangiopathy. Ventriculomegaly, enlarged out of proportion to degree of cortical volume loss, correlate for NPH. Findings were directly discussed with Janae Buchanan  at 6:51 p.m. Workstation performed: [id number]     CTA stroke alert (head/neck)    Result Date: 2/16/2024  Impression: No significant stenosis of the cervical carotid or vertebral arteries No significant intracranial stenosis, large vessel occlusion or aneurysm. Multifocal airspace disease in the upper lungs is stable compared with recent chest CT. Findings were directly discussed with Janae Buchanan  at 6:51 p.m. Workstation performed: [id number]     CT chest wo contrast    Result Date: 2/14/2024  Impression: Background emphysema with scattered bulla noted in the upper lobes. Superimposed mixed groundglass and alveolar opacities bilaterally appear asymmetric. Findings suggest infectious etiology. Pulmonary edema also possible but less likely. If there is continued concern for interstitial lung disease, would recommend high-resolution CT scan after the patient's clinical condition improves as an outpatient. Likely reactive lymph nodes. Fusiform  ectasia of the ascending thoracic aorta measuring up to 44 mm. Recommendation is for follow-up low radiation dose chest CT in one year. The study was marked in EPIC for significant notification an follow-up notification at 1 year. Workstation performed: [id number]         Scheduled Meds:  Current Facility-Administered Medications   Medication Dose Route Frequency Provider Last Rate    acetaminophen  650 mg Oral Q6H PRN Sofia A ROXY Valenzuela      aspirin  81 mg Oral Daily ROXY Muniz      atorvastatin  40 mg Oral QPM ROXY Muniz      cefTRIAXone  1,000 mg Intravenous Q24H Sofia A JENNIE ValenzuelaNP 1,000 mg (02/16/24 1409)    docusate sodium  100 mg Oral BID Sofia A ROXY Valenzuela      famotidine  20 mg Oral Daily Sofia A UROXY nicolas      furosemide  20 mg Oral Daily Sofia A UROXY nicolas      heparin (porcine)  5,000 Units Subcutaneous Q8H BOBBI Sofia A ROXY Valenzuela      ipratropium-albuterol  3 mL Nebulization Q6H Ximena Kennedy MD      levothyroxine  88 mcg Oral Early Morning Sofia A ROXY Valenzuela      methylPREDNISolone sodium succinate  40 mg Intravenous Q12H [facility] Ximena Kennedy MD      NIFEdipine  30 mg Oral Daily Sofia A ROXY Valenzuela      oxybutynin  10 mg Oral Daily Sofia A Nicolas, ROXY      senna  1 tablet Oral Daily Sofia A ROXY Valenzuela         Today, Patient Was Seen By: Salty Morin DO    ** Please Note: Dictation voice to text software may have been used in the creation of this document. **

## 2024-02-17 NOTE — PHYSICAL THERAPY NOTE
PT Cancelation   02/17/24 1040   PT Last Visit   PT Visit Date 02/17/24   Note Type   Note Type Cancelled Session   Cancel Reasons Patient off floor/test  (Pt off unit for MRI.  Will follow.)   Licensure   [place] License Number  Viji Fleming [id number]

## 2024-02-17 NOTE — RAPID RESPONSE
Rapid Response Note  Neeraj Palacios 73 y.o. male MRN: [id number]  Unit/Bed#: [address] [place] [address] Encounter: [id number]    Rapid Response Notification(s):   Response called date/time:  2/16/2024 6:02 PM  Response team arrival date/time:  2/16/2024 6:03 PM  Response end date/time:  2/16/2024 6:30 PM  Level of care:  Medsur  Rapid response location:  [facility] unit  Primary reason for rapid response call:  Acute change in neuro status    Rapid Response Intervention(s):   Airway:  None  Breathing:  Oxygen  Circulation:  None  Fluids administered:  None  Medications administered:  None       Assessment:   Aspiration/choking event while eating  Facial droop, mild dysarthria    Plan:   NPO until safe for PO/swallow eval  Stroke alert called. Case discussed with Neurology  CT head, CTA head/neck completed- no acute findings  NIHSS rapidly improving from 2 to 1 for slight residual L. facial droop. No TNK due to rapid improvement of findings  Transfer to SD 2, will remain under Hospitalist service  Full strength ASA now then 81mg daily starting in the AM  Atorvastatin  ECHO  Permissive HTN goal 140-180  MRI brain      Rapid Response Outcome:   Transfer:  Transfer to stepdown 2  Primary service notified of transfer: Yes    Code Status: Level 1 (Full Code)      Family notified of transfer: no- patient will notify his family  Family member contacted: N/A     Background/Situation:   Neeraj Palacios is a 73 y.o. male with PMHx of HTN, prostate CA, who presented with lethargy, cough from [facility] on 2/14. Was hypoxic on room air and improved on 4L NC. COVID/Flu negative. CXR on admission demonstrated b/l pleural effusion and possible multifocal pneumonia. Was being treated with antibiotics by Hospitalist service on the . This evening he experienced a coughing episode and possibly aspirated while eating dinner. Shortly thereafter he was hypoxic requiring escalation to 10L MF. His SBP also dipped to 105 from baseline 120-130s.  Staff noted a left facial droop and an RRT was called. BG was normal at 156. NIHSS was 2 for left facial droop and some very mild expressive aphasia. Stroke alert was called and the case was discussed with the on-call Neurologist. On exam his NIHSS rapidly improved shortly after arrival of the team and he was left with a very mild left facial droop and no other deficits. CT head and CTA head/neck were completed and showed no acute abnormality. The patient was moved to [facility] on 4N. Case was discussed with the Hospitalist who will remains as the primary provider for the patient. Orders for stroke pathway, statin, ASA, permissive HTN, ECHO, and MRI were placed per neurology recs.     Review of Systems   Respiratory:  Negative for shortness of breath.    Neurological:  Negative for dizziness, weakness, light-headedness and headaches.   Psychiatric/Behavioral:  Negative for decreased concentration.    All other systems reviewed and are negative.      Objective:   Vitals:    02/16/24 2100 02/16/24 2200 02/16/24 2227 02/16/24 2300   BP: 123/75 131/81 130/79 131/79   BP Location:       Pulse: 59 59 60 58   Resp:   19    Temp:       TempSrc:       SpO2: 91% 93% 95% 96%   Weight:       Height:         Physical Exam  Vitals and nursing note reviewed.   Constitutional:       General: He is not in acute distress.     Appearance: He is not toxic-appearing.      Interventions: Nasal cannula in place.   HENT:      Head: Normocephalic.      Nose: Nose normal.      Mouth/Throat:      Mouth: Mucous membranes are moist.   Eyes:      General: Lids are normal.      Extraocular Movements: Extraocular movements intact.      Conjunctiva/sclera: Conjunctivae normal.      Pupils: Pupils are equal, round, and reactive to light.   Cardiovascular:      Rate and Rhythm: Normal rate and regular rhythm.      Pulses: Normal pulses.           Radial pulses are 2+ on the right side and 2+ on the left side.   Pulmonary:      Effort: Pulmonary effort is  "normal. No respiratory distress.      Breath sounds: Normal breath sounds. No wheezing.   Abdominal:      Palpations: Abdomen is soft.   Musculoskeletal:         General: Normal range of motion.   Skin:     General: Skin is warm and dry.   Neurological:      General: No focal deficit present.      Mental Status: He is alert and oriented to person, place, and time.      GCS: GCS eye subscore is 4. GCS verbal subscore is 5. GCS motor subscore is 6.      Cranial Nerves: Dysarthria (very mild word-finding difficulty) and facial asymmetry (very mild left sided droop noted at the mouth. Rapidly improved when team arrived) present.      Sensory: No sensory deficit.      Motor: No weakness.      Coordination: Coordination normal.      Comments: NIHSS 2 for facial droop, dysarthria. Rapidly improving. NIHSS only 1 for very mild facial droop at the conclusion of the RRT.    Psychiatric:         Attention and Perception: Attention normal.         Mood and Affect: Mood normal.         Speech: Speech normal.         Behavior: Behavior is cooperative.         Portions of the record may have been created with voice recognition software.  Occasional wrong word or \"sound a like\" substitutions may have occurred due to the inherent limitations of voice recognition software.  Read the chart carefully and recognize, using context, where substitutions have occurred.    ROXY Muniz      "

## 2024-02-18 ENCOUNTER — APPOINTMENT (INPATIENT)
Dept: RADIOLOGY | Facility: HOSPITAL | Age: 74
DRG: 196 | End: 2024-02-18
Payer: MEDICARE

## 2024-02-18 LAB
ANION GAP SERPL CALCULATED.3IONS-SCNC: 10 MMOL/L
ARTERIAL PATENCY WRIST A: YES
BASE EXCESS BLDA CALC-SCNC: -0.5 MMOL/L
BNP SERPL-MCNC: 100 PG/ML (ref 0–100)
BODY TEMPERATURE: 97.4 DEGREES FEHRENHEIT
BUN SERPL-MCNC: 29 MG/DL (ref 5–25)
CALCIUM SERPL-MCNC: 9.6 MG/DL (ref 8.4–10.2)
CHLORIDE SERPL-SCNC: 103 MMOL/L (ref 96–108)
CO2 SERPL-SCNC: 26 MMOL/L (ref 21–32)
CREAT SERPL-MCNC: 1.06 MG/DL (ref 0.6–1.3)
ERYTHROCYTE [DISTWIDTH] IN BLOOD BY AUTOMATED COUNT: 13.4 % (ref 11.6–15.1)
GFR SERPL CREATININE-BSD FRML MDRD: 68 ML/MIN/1.73SQ M
GLUCOSE SERPL-MCNC: 114 MG/DL (ref 65–140)
HCO3 BLDA-SCNC: 23.7 MMOL/L (ref 22–28)
HCT VFR BLD AUTO: 47.6 % (ref 36.5–49.3)
HGB BLD-MCNC: 16.5 G/DL (ref 12–17)
MCH RBC QN AUTO: 31.3 PG (ref 26.8–34.3)
MCHC RBC AUTO-ENTMCNC: 34.7 G/DL (ref 31.4–37.4)
MCV RBC AUTO: 90 FL (ref 82–98)
NASAL CANNULA: 10
O2 CT BLDA-SCNC: 21.4 ML/DL (ref 16–23)
OXYHGB MFR BLDA: 95.7 % (ref 94–97)
PCO2 BLDA: 37.5 MM HG (ref 36–44)
PCO2 TEMP ADJ BLDA: 36.4 MM HG (ref 36–44)
PH BLD: 7.43 [PH] (ref 7.35–7.45)
PH BLDA: 7.42 [PH] (ref 7.35–7.45)
PLATELET # BLD AUTO: 270 THOUSANDS/UL (ref 149–390)
PMV BLD AUTO: 9.7 FL (ref 8.9–12.7)
PO2 BLD: 79.8 MM HG (ref 75–129)
PO2 BLDA: 83.5 MM HG (ref 75–129)
POTASSIUM SERPL-SCNC: 4.9 MMOL/L (ref 3.5–5.3)
PROCALCITONIN SERPL-MCNC: 0.05 NG/ML
RBC # BLD AUTO: 5.27 MILLION/UL (ref 3.88–5.62)
SODIUM SERPL-SCNC: 139 MMOL/L (ref 135–147)
SPECIMEN SOURCE: NORMAL
WBC # BLD AUTO: 12.08 THOUSAND/UL (ref 4.31–10.16)

## 2024-02-18 PROCEDURE — 36600 WITHDRAWAL OF ARTERIAL BLOOD: CPT

## 2024-02-18 PROCEDURE — 84145 PROCALCITONIN (PCT): CPT | Performed by: INTERNAL MEDICINE

## 2024-02-18 PROCEDURE — 92610 EVALUATE SWALLOWING FUNCTION: CPT | Performed by: SPEECH-LANGUAGE PATHOLOGIST

## 2024-02-18 PROCEDURE — 85027 COMPLETE CBC AUTOMATED: CPT | Performed by: INTERNAL MEDICINE

## 2024-02-18 PROCEDURE — 99232 SBSQ HOSP IP/OBS MODERATE 35: CPT | Performed by: STUDENT IN AN ORGANIZED HEALTH CARE EDUCATION/TRAINING PROGRAM

## 2024-02-18 PROCEDURE — 94760 N-INVAS EAR/PLS OXIMETRY 1: CPT

## 2024-02-18 PROCEDURE — 94640 AIRWAY INHALATION TREATMENT: CPT

## 2024-02-18 PROCEDURE — 80048 BASIC METABOLIC PNL TOTAL CA: CPT | Performed by: INTERNAL MEDICINE

## 2024-02-18 PROCEDURE — 99232 SBSQ HOSP IP/OBS MODERATE 35: CPT | Performed by: INTERNAL MEDICINE

## 2024-02-18 PROCEDURE — 71045 X-RAY EXAM CHEST 1 VIEW: CPT

## 2024-02-18 PROCEDURE — 82805 BLOOD GASES W/O2 SATURATION: CPT | Performed by: NURSE PRACTITIONER

## 2024-02-18 PROCEDURE — 83880 ASSAY OF NATRIURETIC PEPTIDE: CPT | Performed by: NURSE PRACTITIONER

## 2024-02-18 RX ORDER — CEFTRIAXONE 2 G/50ML
2000 INJECTION, SOLUTION INTRAVENOUS EVERY 24 HOURS
Status: DISCONTINUED | OUTPATIENT
Start: 2024-02-18 | End: 2024-02-20

## 2024-02-18 RX ORDER — CEFTRIAXONE 1 G/50ML
1000 INJECTION, SOLUTION INTRAVENOUS ONCE
Status: COMPLETED | OUTPATIENT
Start: 2024-02-18 | End: 2024-02-18

## 2024-02-18 RX ORDER — FUROSEMIDE 10 MG/ML
40 INJECTION INTRAMUSCULAR; INTRAVENOUS ONCE
Status: COMPLETED | OUTPATIENT
Start: 2024-02-18 | End: 2024-02-18

## 2024-02-18 RX ORDER — IPRATROPIUM BROMIDE AND ALBUTEROL SULFATE 2.5; .5 MG/3ML; MG/3ML
3 SOLUTION RESPIRATORY (INHALATION)
Status: DISCONTINUED | OUTPATIENT
Start: 2024-02-18 | End: 2024-02-25 | Stop reason: HOSPADM

## 2024-02-18 RX ORDER — GUAIFENESIN 600 MG/1
1200 TABLET, EXTENDED RELEASE ORAL EVERY 12 HOURS SCHEDULED
Status: DISCONTINUED | OUTPATIENT
Start: 2024-02-18 | End: 2024-02-25 | Stop reason: HOSPADM

## 2024-02-18 RX ADMIN — FUROSEMIDE 40 MG: 10 INJECTION, SOLUTION INTRAMUSCULAR; INTRAVENOUS at 04:14

## 2024-02-18 RX ADMIN — CEFTRIAXONE 1000 MG: 1 INJECTION, SOLUTION INTRAVENOUS at 02:58

## 2024-02-18 RX ADMIN — FUROSEMIDE 20 MG: 20 TABLET ORAL at 09:09

## 2024-02-18 RX ADMIN — HEPARIN SODIUM 5000 UNITS: 5000 INJECTION INTRAVENOUS; SUBCUTANEOUS at 13:40

## 2024-02-18 RX ADMIN — ATORVASTATIN CALCIUM 40 MG: 40 TABLET, FILM COATED ORAL at 17:09

## 2024-02-18 RX ADMIN — IPRATROPIUM BROMIDE AND ALBUTEROL SULFATE 3 ML: 2.5; .5 SOLUTION RESPIRATORY (INHALATION) at 07:07

## 2024-02-18 RX ADMIN — DOCUSATE SODIUM 100 MG: 100 CAPSULE, LIQUID FILLED ORAL at 17:09

## 2024-02-18 RX ADMIN — ASPIRIN 81 MG 81 MG: 81 TABLET ORAL at 09:09

## 2024-02-18 RX ADMIN — LEVOTHYROXINE SODIUM 88 MCG: 88 TABLET ORAL at 05:37

## 2024-02-18 RX ADMIN — NIFEDIPINE 30 MG: 30 TABLET, EXTENDED RELEASE ORAL at 09:09

## 2024-02-18 RX ADMIN — HEPARIN SODIUM 5000 UNITS: 5000 INJECTION INTRAVENOUS; SUBCUTANEOUS at 05:37

## 2024-02-18 RX ADMIN — CEFTRIAXONE 2000 MG: 2 INJECTION, SOLUTION INTRAVENOUS at 14:35

## 2024-02-18 RX ADMIN — GUAIFENESIN AND DEXTROMETHORPHAN 10 ML: 100; 10 SYRUP ORAL at 02:12

## 2024-02-18 RX ADMIN — IPRATROPIUM BROMIDE AND ALBUTEROL SULFATE 3 ML: 2.5; .5 SOLUTION RESPIRATORY (INHALATION) at 01:53

## 2024-02-18 RX ADMIN — BENZONATATE 200 MG: 100 CAPSULE ORAL at 09:09

## 2024-02-18 RX ADMIN — FAMOTIDINE 20 MG: 20 TABLET, FILM COATED ORAL at 09:09

## 2024-02-18 RX ADMIN — METHYLPREDNISOLONE SODIUM SUCCINATE 40 MG: 40 INJECTION, POWDER, FOR SOLUTION INTRAMUSCULAR; INTRAVENOUS at 09:10

## 2024-02-18 RX ADMIN — SENNOSIDES 8.6 MG: 8.6 TABLET, FILM COATED ORAL at 09:09

## 2024-02-18 RX ADMIN — IPRATROPIUM BROMIDE AND ALBUTEROL SULFATE 3 ML: 2.5; .5 SOLUTION RESPIRATORY (INHALATION) at 19:32

## 2024-02-18 RX ADMIN — IPRATROPIUM BROMIDE AND ALBUTEROL SULFATE 3 ML: 2.5; .5 SOLUTION RESPIRATORY (INHALATION) at 13:16

## 2024-02-18 RX ADMIN — BENZONATATE 200 MG: 100 CAPSULE ORAL at 17:09

## 2024-02-18 RX ADMIN — BENZONATATE 200 MG: 100 CAPSULE ORAL at 21:39

## 2024-02-18 RX ADMIN — HEPARIN SODIUM 5000 UNITS: 5000 INJECTION INTRAVENOUS; SUBCUTANEOUS at 21:39

## 2024-02-18 RX ADMIN — OXYBUTYNIN CHLORIDE 10 MG: 5 TABLET, EXTENDED RELEASE ORAL at 09:09

## 2024-02-18 RX ADMIN — GUAIFENESIN 1200 MG: 600 TABLET, EXTENDED RELEASE ORAL at 21:39

## 2024-02-18 RX ADMIN — METHYLPREDNISOLONE SODIUM SUCCINATE 40 MG: 40 INJECTION, POWDER, FOR SOLUTION INTRAMUSCULAR; INTRAVENOUS at 21:39

## 2024-02-18 RX ADMIN — DOCUSATE SODIUM 100 MG: 100 CAPSULE, LIQUID FILLED ORAL at 09:09

## 2024-02-18 RX ADMIN — GUAIFENESIN 1200 MG: 600 TABLET, EXTENDED RELEASE ORAL at 03:11

## 2024-02-18 NOTE — ASSESSMENT & PLAN NOTE
History of hypertension hypothyroidism OAB presents from [facility] for cough hypoxia shortness of breath found to have multifocal pneumonia and probable ILD  Pulmonary following.  Multifocal pneumonia: On ceftriaxone.  ILD possible.  CRP elevated.  On IV methylprednisolone.:  Remains on mid flow oxygen.    Results from last 7 days   Lab Units 02/18/24  0438 02/17/24  0457 02/14/24  0515   PROCALCITONIN ng/ml 0.05 0.05 0.05     Results from last 7 days   Lab Units 02/17/24  0457   CRP mg/L 75.2*

## 2024-02-18 NOTE — QUICK NOTE
Saw patient at bedside for hypoxia in high 70s on RA. Patient removed neb treatment and O2 cannula resulted above hypoxia.Patient confused per RN. O2 increased to MF 10L,satting mid 90s.    Lung sounds diffuse rales  Left fingers slight puffy   HR regular      Stat ABG normal  CXR appears to have fluid overload.      Increased rocephin to 2gm due to weight.   Started on Mucinex.   Lasix 40mg iv x 1.  Continue to monitor patient.

## 2024-02-18 NOTE — PROGRESS NOTES
[facility]  Progress Note  Name: Neeraj Palacios I  MRN: [id number]  Unit/Bed#: 4 [address] I Date of Admission: 2/14/2024   Date of Service: 2/18/2024 I Hospital Day: 4    Assessment/Plan   * Acute respiratory failure with hypoxia (HCC)  Assessment & Plan  History of hypertension hypothyroidism OAB presents from [facility] living for cough hypoxia shortness of breath found to have multifocal pneumonia and probable ILD  Pulmonary following.  Multifocal pneumonia: On ceftriaxone.  ILD possible.  CRP elevated.  On IV methylprednisolone.:  Remains on mid flow oxygen.    Results from last 7 days   Lab Units 02/18/24  0438 02/17/24  0457 02/14/24  0515   PROCALCITONIN ng/ml 0.05 0.05 0.05     Results from last 7 days   Lab Units 02/17/24  0457   CRP mg/L 75.2*       Hypothyroidism  Assessment & Plan  Continue levothyroxine    Stroke-like episode  Assessment & Plan  Strokelike episode necessitating rapid response/stroke alert  MRI of brain: No acute CVA.  Marked hydrocephalus.  Seen by neurology.  Symptoms of left facial droop may have been from coughing fit  Neurology recommends continuation of aspirin and atorvastatin    Aortic ectasia (HCC)  Assessment & Plan  Needs outpatient follow-up    Overactive bladder  Assessment & Plan  Prior to admission on myrbetriq.  Currently on oxybutynin    Swelling of lower leg  Assessment & Plan  Echocardiogram without LV dysfunction.  Remains on furosemide    Hypertension  Assessment & Plan  Stable, continue nifedipine    VTE Pharmacologic Prophylaxis:   Moderate Risk (Score 3-4) - Pharmacological DVT Prophylaxis Ordered: heparin.    Mobility:  Basic Mobility Inpatient Raw Score: 7  -HLM Goal: 2: Bed activities/Dependent transfer  -HLM Achieved: 2: Bed activities/Dependent transfer  HLM Goal NOT achieved. Continue with multidisciplinary rounding and encourage appropriate mobility to improve upon HLM goals.    Patient Centered Rounds: I have performed  "bedside rounds with nursing staff today.  Discussions with Specialists or Other Care Team Provider: Pulmonology    Education and Discussions with Family / Patient: Updated  (sister) via phone.    Time Spent for Care:   This time was spent on one or more of the following: performing physical exam; counseling and coordination of care; obtaining or reviewing history; documenting in the medical record; reviewing/ordering tests, medications or procedures; communicating with other healthcare professionals and discussing with patient's family/caregivers.    Current Length of Stay: 4 day(s)  Current Patient Status: Inpatient   Certification Statement: The patient will continue to require additional inpatient hospital stay due to respiratory failure  Discharge Plan: Anticipate discharge in >72 hrs to rehab facility.    Code Status: Level 1 - Full Code      Subjective:   Patient seen and examined.  Overnight events noted with hypoxia requiring a dose of IV furosemide    Objective:   Vitals: Blood pressure 130/79, pulse 73, temperature 97.9 °F (36.6 °C), resp. rate 16, height 6' 3\" (1.905 m), weight 101 kg (223 lb 1.7 oz), SpO2 95%.    Intake/Output Summary (Last 24 hours) at 2/18/2024 1636  Last data filed at 2/18/2024 0919  Gross per 24 hour   Intake 300 ml   Output 2350 ml   Net -2050 ml       Physical Exam  Vitals reviewed.   Constitutional:       General: He is not in acute distress.  HENT:      Head: Atraumatic.   Eyes:      General: No scleral icterus.  Cardiovascular:      Rate and Rhythm: Regular rhythm.      Heart sounds: Normal heart sounds.   Pulmonary:      Effort: Pulmonary effort is normal.      Breath sounds: Decreased breath sounds present. No wheezing.      Comments: Scattered crackles bilaterally  Abdominal:      General: Bowel sounds are normal.      Palpations: Abdomen is soft.      Tenderness: There is no abdominal tenderness. There is no rebound.   Skin:     General: Skin is warm.      " Coloration: Skin is not jaundiced.   Neurological:      General: No focal deficit present.      Mental Status: He is alert.   Psychiatric:         Mood and Affect: Mood normal.       Additional Data:   Labs:  Results from last 7 days   Lab Units 02/18/24 0438 02/17/24 0457 02/16/24  0500   WBC Thousand/uL 12.08* 6.14 7.44   HEMOGLOBIN g/dL 16.5 15.2 14.8   PLATELETS Thousands/uL 270 236 218   MCV fL 90 91 88     Results from last 7 days   Lab Units 02/18/24 0438 02/17/24 0457 02/16/24  0500 02/15/24  0501 02/14/24  0515   SODIUM mmol/L 139 137 136   < > 138   POTASSIUM mmol/L 4.9 4.6 3.7   < > 4.3   CHLORIDE mmol/L 103 106 104   < > 105   CO2 mmol/L 26 22 24   < > 25   ANION GAP mmol/L 10 9 8   < > 8   BUN mg/dL 29* 18 13   < > 13   CREATININE mg/dL 1.06 0.87 0.85   < > 0.90   CALCIUM mg/dL 9.6 9.2 8.7   < > 9.3   ALBUMIN g/dL  --  3.6  --   --  4.1   TOTAL BILIRUBIN mg/dL  --  0.56  --   --  0.78   ALK PHOS U/L  --  58  --   --  67   ALT U/L  --  8  --   --  8   AST U/L  --  18  --   --  22   EGFR ml/min/1.73sq m 68 85 85   < > 83   GLUCOSE RANDOM mg/dL 114 131 82   < > 104    < > = values in this interval not displayed.             Results from last 7 days   Lab Units 02/14/24  0927 02/14/24  0715 02/14/24  0515   HS TNI 0HR ng/L  --   --  13   HS TNI 2HR ng/L  --  14  --    HS TNI 4HR ng/L 13  --   --           Results from last 7 days   Lab Units 02/18/24 0438 02/17/24 0457 02/14/24  0515   LACTIC ACID mmol/L  --   --  1.7   PROCALCITONIN ng/ml 0.05   < > 0.05    < > = values in this interval not displayed.     Results from last 7 days   Lab Units 02/16/24  1803   POC GLUCOSE mg/dl 156*     Results from last 7 days   Lab Units 02/17/24  0457   HEMOGLOBIN A1C % 4.9     Results from last 7 days   Lab Units 02/14/24  0927   TSH 3RD GENERATON uIU/mL 4.522*     * I Have Reviewed All Lab Data Listed Above.    Cultures:   Results from last 7 days   Lab Units 02/14/24  1809 02/14/24  0516 02/14/24  0515   BLOOD  CULTURE   --  No Growth After 4 Days. No Growth After 4 Days.   INFLUENZA A PCR   --   --  Negative   LEGIONELLA URINARY ANTIGEN  Negative  --   --    STREP PNEUMONIAE ANTIGEN, URINE  Negative  --   --        Results from last 7 days   Lab Units 02/14/24  0515   SARS-COV-2  Negative   INFLUENZA A PCR  Negative   INFLUENZA B PCR  Negative   RSV PCR  Negative           Lines/Drains:  Invasive Devices       Peripheral Intravenous Line  Duration             Peripheral IV 02/17/24 Right;Ventral (anterior) Wrist 1 day              Drain  Duration             External Urinary Catheter Medium 1 day                  Telemetry:      Imaging:  Imaging Reports Reviewed Today Include:   XR chest portable    Result Date: 2/18/2024  Impression: Cardiomegaly. Vascular congestion. Bilateral patchy airspace disease. Workstation performed: [id number]     MRI brain wo contrast    Result Date: 2/17/2024  Impression: No acute ischemia as clinically questioned. Moderate periventricular white matter change on T2 and FLAIR imaging consistent with chronic microangiopathic change. Marked hydrocephalus involving the lateral ventricles and third ventricle similar to CT scan performed yesterday. 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 Workstation performed: [id number]     Scheduled Meds:  Current Facility-Administered Medications   Medication Dose Route Frequency Provider Last Rate    acetaminophen  650 mg Oral Q6H PRN ROXY Wright      aspirin  81 mg Oral Daily ROXY Muniz      atorvastatin  40 mg Oral QPM ROXY Muniz      benzonatate  200 mg Oral TID Salty Morin DO      cefTRIAXone  2,000 mg Intravenous Q24H ROXY Abbott 2,000 mg (02/18/24 1435)    dextromethorphan-guaiFENesin  10 mL Oral Q4H PRN Salty Morin DO      docusate sodium  100 mg Oral BID ROXY Wright      famotidine  20 mg Oral Daily Sofia Valenzuela  ROXY      furosemide  20 mg Oral Daily ROXY Wright      guaiFENesin  1,200 mg Oral Q12H [facility] ROXY Abbott      heparin (porcine)  5,000 Units Subcutaneous Q8H [facility] ROXY Wright      ipratropium-albuterol  3 mL Nebulization TID Ximena Kennedy MD      levothyroxine  88 mcg Oral Early Morning ROXY Wright      methylPREDNISolone sodium succinate  40 mg Intravenous Q12H [facility] Ximena Kennedy MD      NIFEdipine  30 mg Oral Daily ROXY Wright      oxybutynin  10 mg Oral Daily ROXY Wright      senna  1 tablet Oral Daily ROXY Wright         Today, Patient Was Seen By: Salty Morin DO    ** Please Note: Dictation voice to text software may have been used in the creation of this document. **

## 2024-02-18 NOTE — PLAN OF CARE
Problem: Prexisting or High Potential for Compromised Skin Integrity  Goal: Skin integrity is maintained or improved  Description: INTERVENTIONS:  - Identify patients at risk for skin breakdown  - Assess and monitor skin integrity  - Assess and monitor nutrition and hydration status  - Monitor labs   - Assess for incontinence   - Turn and reposition patient  - Assist with mobility/ambulation  - Relieve pressure over bony prominences  - Avoid friction and shearing  - Provide appropriate hygiene as needed including keeping skin clean and dry  - Evaluate need for skin moisturizer/barrier cream  - Collaborate with interdisciplinary team   - Patient/family teaching  - Consider wound care consult   2/18/2024 0426 by Melissa Martines RN  Outcome: Progressing  2/17/2024 1906 by Melissa Martines RN  Outcome: Progressing     Problem: SAFETY ADULT  Goal: Patient will remain free of falls  Description: INTERVENTIONS:  - Educate patient/family on patient safety including physical limitations  - Instruct patient to call for assistance with activity   - Consult OT/PT to assist with strengthening/mobility   - Keep Call bell within reach  - Keep bed low and locked with side rails adjusted as appropriate  - Keep care items and personal belongings within reach  - Initiate and maintain comfort rounds  - Make Fall Risk Sign visible to staff  - Offer Toileting every 2 Hours, in advance of need  - Initiate/Maintain bed alarm  - Obtain necessary fall risk management equipment: n/a  - Apply yellow socks and bracelet for high fall risk patients  - Consider moving patient to room near nurses station  2/18/2024 0426 by Melissa Martines RN  Outcome: Progressing  2/17/2024 1906 by Melissa Martines RN  Outcome: Progressing  Goal: Maintain or return to baseline ADL function  Description: INTERVENTIONS:  -  Assess patient's ability to carry out ADLs; assess patient's baseline for ADL function and identify physical deficits which impact ability to  perform ADLs (bathing, care of mouth/teeth, toileting, grooming, dressing, etc.)  - Assess/evaluate cause of self-care deficits   - Assess range of motion  - Assess patient's mobility; develop plan if impaired  - Assess patient's need for assistive devices and provide as appropriate  - Encourage maximum independence but intervene and supervise when necessary  - Involve family in performance of ADLs  - Assess for home care needs following discharge   - Consider OT consult to assist with ADL evaluation and planning for discharge  - Provide patient education as appropriate  2/18/2024 0426 by Melissa Martines RN  Outcome: Progressing  2/17/2024 1906 by Melissa Martines RN  Outcome: Progressing  Goal: Maintains/Returns to pre admission functional level  Description: INTERVENTIONS:  - Perform AM-PAC 6 Click Basic Mobility/ Daily Activity assessment daily.  - Set and communicate daily mobility goal to care team and patient/family/caregiver.   - Collaborate with rehabilitation services on mobility goals if consulted  - Perform Range of Motion 3 times a day.  - Reposition patient every 2 hours.  - Dangle patient 3 times a day  - Stand patient 3 times a day  - Ambulate patient 3 times a day  - Out of bed to chair 3 times a day   - Out of bed for meals 3 times a day  - Out of bed for toileting  - Record patient progress and toleration of activity level   2/18/2024 0426 by Melissa Martines RN  Outcome: Progressing  2/17/2024 1906 by Melissa Martines RN  Outcome: Progressing     Problem: NEUROSENSORY - ADULT  Goal: Achieves stable or improved neurological status  Description: INTERVENTIONS  - Monitor and report changes in neurological status  - Monitor vital signs such as temperature, blood pressure, glucose, and any other labs ordered   - Initiate measures to prevent increased intracranial pressure  - Monitor for seizure activity and implement precautions if appropriate      2/18/2024 0426 by Melissa Martines RN  Outcome:  Progressing  2/17/2024 1906 by Melissa Martines RN  Outcome: Progressing  Goal: Remains free of injury related to seizures activity  Description: INTERVENTIONS  - Maintain airway, patient safety  and administer oxygen as ordered  - Monitor patient for seizure activity, document and report duration and description of seizure to physician/advanced practitioner  - If seizure occurs,  ensure patient safety during seizure  - Reorient patient post seizure  - Seizure pads on all 4 side rails  - Instruct patient/family to notify RN of any seizure activity including if an aura is experienced  - Instruct patient/family to call for assistance with activity based on nursing assessment  - Administer anti-seizure medications if ordered    2/18/2024 0426 by Melissa Martines RN  Outcome: Progressing  2/17/2024 1906 by Melissa Martines RN  Outcome: Progressing  Goal: Achieves maximal functionality and self care  Description: INTERVENTIONS  - Monitor swallowing and airway patency with patient fatigue and changes in neurological status  - Encourage and assist patient to increase activity and self care.   - Encourage visually impaired, hearing impaired and aphasic patients to use assistive/communication devices  2/18/2024 0426 by Melissa Martines RN  Outcome: Progressing  2/17/2024 1906 by Melissa Martines RN  Outcome: Progressing     Problem: CARDIOVASCULAR - ADULT  Goal: Maintains optimal cardiac output and hemodynamic stability  Description: INTERVENTIONS:  - Monitor I/O, vital signs and rhythm  - Monitor for S/S and trends of decreased cardiac output  - Administer and titrate ordered vasoactive medications to optimize hemodynamic stability  - Assess quality of pulses, skin color and temperature  - Assess for signs of decreased coronary artery perfusion  - Instruct patient to report change in severity of symptoms  2/18/2024 0426 by Melissa Martines RN  Outcome: Progressing  2/17/2024 1906 by Melissa Martines RN  Outcome: Progressing  Goal:  Absence of cardiac dysrhythmias or at baseline rhythm  Description: INTERVENTIONS:  - Continuous cardiac monitoring, vital signs, obtain 12 lead EKG if ordered  - Administer antiarrhythmic and heart rate control medications as ordered  - Monitor electrolytes and administer replacement therapy as ordered  2/18/2024 0426 by Melissa Martines, RN  Outcome: Progressing  2/17/2024 1906 by Melissa Martines, RN  Outcome: Progressing

## 2024-02-18 NOTE — PROGRESS NOTES
"Pulmonary Progress Note   Neeraj Palacios 73 y.o. male MRN: [id number]    Unit/Bed#: [address] [place] [address] Encounter: [id number]          Subjective:   Patient seen and examined.  He was seen today as he was getting shaved, appreciate nursing staff for that    He feels okay today, has some improvement.  Noted overnight events where he was hypoxemic again and given a diuretic    Physical Exam:    GEN: alert and oriented x 3, pleasant and cooperative   HEENT:  Normocephalic, atraumatic, anicteric  NECK: No JVD  HEART: Rate, normal S1 and S2  LUNGS: Mild rhonchi bilaterally, no wheezing  ABDOMEN:  Normoactive bowel sounds, soft, no tenderness, no distention  EXTREMITIES: peripheral pulses palpable; no edema  NEURO: no gross focal findings; cranial nerves grossly intact   SKIN:  Dry, intact, warm to touch    Vitals: Blood pressure 131/81, pulse 67, temperature 97.6 °F (36.4 °C), resp. rate 20, height 6' 3\" (1.905 m), weight 101 kg (223 lb 1.7 oz), SpO2 95%., Body mass index is 27.89 kg/m².,   Orthostatic Blood Pressures      Flowsheet Row Most Recent Value   Blood Pressure 131/81 filed at 02/18/2024 0743   Patient Position - Orthostatic VS Sitting filed at 02/16/2024 1040              Intake/Output Summary (Last 24 hours) at 2/18/2024 1502  Last data filed at 2/18/2024 0919  Gross per 24 hour   Intake 300 ml   Output 2350 ml   Net -2050 ml       Labs & Results:      Results from last 7 days   Lab Units 02/18/24  0438 02/17/24  0457 02/16/24  0500   POTASSIUM mmol/L 4.9 4.6 3.7   CO2 mmol/L 26 22 24   CHLORIDE mmol/L 103 106 104   BUN mg/dL 29* 18 13   CREATININE mg/dL 1.06 0.87 0.85     Results from last 7 days   Lab Units 02/18/24  0438 02/17/24  0457 02/16/24  0500   HEMOGLOBIN g/dL 16.5 15.2 14.8   HEMATOCRIT % 47.6 45.8 42.7   PLATELETS Thousands/uL 270 236 218       Imaging:  I have personally reviewed pertinent films in PACS    Micro: Reviewed      I have performed an independent review and interpreted all relevant images " in PACS, labs, pathology and external records relevant to patient care     Assessment:    Acute hypoxemic respiratory failure on 6 L nasal cannula in the setting of suspected community acquired pneumonia.  Comes from a healthcare facility, workup has been negative so far, continue on ceftriaxone.  Unfortunately he is not improving as much as we anticipated.  His CRP was checked yesterday which is significantly elevated.  Ideally while on steroids that would come down, the question is where is this elevated from.  May be from inflammatory process from acute pneumonia..  Probable ILD, no specific pattern, did have significant occupational exposures.  He is not currently in flare  COPD unclassified, CT with emphysema, mMRC 1 not on inhalers.   Ambulatory dysfunction due to prostate cancer surgery  History of prostate cancer treated with surgery  Ex-smoker quit around 2000    Plan:    Will hold on diuretics today, noted bump in BUN and creatinine  Oxygen is stable today at 6 L and he has symptomatic improvement  Continue same dose IV steroids for now  Continue ceftriaxone  Continue DuoNebs 3 times daily to avoid him waking up in the middle of the night.  It seems that the last 2 nights he has had issues  Keep oxygen saturations above 88%  No ILD workup while inpatient    Ximena Kennedy MD  Pulmonary and Critical Care Medicine

## 2024-02-18 NOTE — SPEECH THERAPY NOTE
Speech-Language Pathology Bedside Swallow Evaluation        Patient Name: Neeraj Palacios    Today's Date: 2/18/2024     Problem List  Patient Active Problem List   Diagnosis    Acute respiratory failure with hypoxia (HCC)    Hypertension    Swelling of lower leg    Overactive bladder    Balance problem    Aortic ectasia (HCC)    Stroke-like episode    Hypothyroidism       Past Medical History  Past Medical History:   Diagnosis Date    GERD without esophagitis     Hypertension     Hypothyroid     Prostate CA (HCC)        Past Surgical History  History reviewed. No pertinent surgical history.      Current Medical Status  Pt is a 73 y.o. male who presented to  [facility]  with hypoxia. H/o lung ca. CT PE protocol showed emphysema, mild groundglass and parenchymal infiltrates, no significant mediastinal adenopathy.  He apparently had a choking episode with lettuce per RN with worsening resp status. A stroke alert was also called for mild L facial droop and dysarthria - MRI was negative. Patient does admit to prior esoph dilation which was beneficial. He denies any current dysphagia, dysarthria or aphasia.    Past medical history:   Please see H&P for details      Special Studies:  2/18 CXR pending    2/17 MRI brain: No acute ischemia as clinically questioned. Moderate periventricular white matter change on T2 and FLAIR imaging consistent with chronic microangiopathic change. Marked hydrocephalus involving the lateral ventricles and third ventricle similar to CT scan performed yesterday. 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    2/16 CTA head/neck: No significant stenosis of the cervical carotid or vertebral arteries No significant intracranial stenosis, large vessel occlusion or aneurysm. Multifocal airspace disease in the upper lungs is stable compared with recent chest CT.    2/14 CT chest wo contrast: Background emphysema with  scattered bulla noted in the upper lobes. Superimposed mixed groundglass and alveolar opacities bilaterally appear asymmetric. Findings suggest infectious etiology. Pulmonary edema also possible but less likely. If there is continued concern for interstitial lung disease, would recommend high-resolution CT scan after the patient's clinical condition improves as an outpatient. Likely reactive lymph nodes. Fusiform ectasia of the ascending thoracic aorta measuring up to 44 mm. Recommendation is for follow-up low radiation dose chest CT in one year.    2/14 CXR: Bilateral pulmonary reticulation and possible small bilateral pleural effusions can represent edema. Interstitial lung disease and infection can have a similar appearance.     Swallow Information   Current Risks for Dysphagia & Aspiration: known history of dysphagia and change in respiratory status     Current Symptoms/Concerns: choking incident and change in respiratory status    Current Diet: regular diet and thin liquids      Baseline Diet: regular diet and thin liquids      Baseline Assessment   Behavior/Cognition: alert and somewhat tangential (does admit to some confusion)    Speech/Language Status: able to participate in conversation and able to follow commands    Patient Positioning: upright in bed      Swallow Mechanism Exam   Facial:  ? L asymmetry vs baseline  Labial: WFL  Lingual:  slight deviation of lingual tip to R  Velum: symmetrical  Mandible: adequate ROM  Dentition: adequate  Vocal quality:clear/adequate   Volitional Cough: strong/productive   Resp: 8L    Consistencies Assessed and Performance   Consistencies Administered: thin liquids, puree, and hard solids  Specific materials administered included: applesauce, cookies, thin liquids    Oral Stage:   Mastication was min prolonged but adequate with the materials administered today.  Bolus formation and transfer were functional with no significant oral residue noted.  No overt s/s reduced oral  control.    Pharyngeal Stage:   Swallowing initiation appeared prompt. Laryngeal rise was palpated and judged to be within functional limits.  No coughing, throat clearing, change in vocal quality noted today. Occ drop in SPO2 ( high 80s) noted which was also noted prior to any po intake.     Esophageal Concerns:  prior dilations  +small hiatal hernia    Summary   s/s suggestive of minimal oral and suspected mild pharyngeal dysphagia given current resp status. Additionally suspect at least mild esophageal dysph given report of prior dilations.     Recommendations: soft/level 3 diet and thin liquids     Recommended Form of Meds:  as tolerated      Aspiration precautions and compensatory swallowing strategies: upright posture, slow rate of feeding, and small bites/sips    Results Reviewed with: patient, RN, and MD     Dysphagia Goals: pt will tolerate regular textures with thin liquids without s/s of aspiration x3    Plan  Will f/u      Yanely Hurst M.S., CCC-SLP  Speech Language Pathologist   Available via [employer]  [place] #[id number]  [place] #[id number]

## 2024-02-19 DIAGNOSIS — G91.2 NPH (NORMAL PRESSURE HYDROCEPHALUS) (HCC): Primary | ICD-10-CM

## 2024-02-19 LAB
ANION GAP SERPL CALCULATED.3IONS-SCNC: 7 MMOL/L
BACTERIA BLD CULT: NORMAL
BACTERIA BLD CULT: NORMAL
BUN SERPL-MCNC: 25 MG/DL (ref 5–25)
CALCIUM SERPL-MCNC: 9.5 MG/DL (ref 8.4–10.2)
CHLORIDE SERPL-SCNC: 104 MMOL/L (ref 96–108)
CO2 SERPL-SCNC: 26 MMOL/L (ref 21–32)
CREAT SERPL-MCNC: 0.85 MG/DL (ref 0.6–1.3)
CRP SERPL QL: 12.1 MG/L
ERYTHROCYTE [DISTWIDTH] IN BLOOD BY AUTOMATED COUNT: 13.6 % (ref 11.6–15.1)
GFR SERPL CREATININE-BSD FRML MDRD: 85 ML/MIN/1.73SQ M
GLUCOSE SERPL-MCNC: 108 MG/DL (ref 65–140)
HCT VFR BLD AUTO: 45.6 % (ref 36.5–49.3)
HGB BLD-MCNC: 15.5 G/DL (ref 12–17)
MCH RBC QN AUTO: 30.6 PG (ref 26.8–34.3)
MCHC RBC AUTO-ENTMCNC: 34 G/DL (ref 31.4–37.4)
MCV RBC AUTO: 90 FL (ref 82–98)
PLATELET # BLD AUTO: 260 THOUSANDS/UL (ref 149–390)
PMV BLD AUTO: 9.2 FL (ref 8.9–12.7)
POTASSIUM SERPL-SCNC: 4.6 MMOL/L (ref 3.5–5.3)
PROCALCITONIN SERPL-MCNC: <0.05 NG/ML
RBC # BLD AUTO: 5.06 MILLION/UL (ref 3.88–5.62)
SODIUM SERPL-SCNC: 137 MMOL/L (ref 135–147)
WBC # BLD AUTO: 10.94 THOUSAND/UL (ref 4.31–10.16)

## 2024-02-19 PROCEDURE — 86140 C-REACTIVE PROTEIN: CPT | Performed by: INTERNAL MEDICINE

## 2024-02-19 PROCEDURE — 99232 SBSQ HOSP IP/OBS MODERATE 35: CPT | Performed by: PSYCHIATRY & NEUROLOGY

## 2024-02-19 PROCEDURE — 99232 SBSQ HOSP IP/OBS MODERATE 35: CPT | Performed by: FAMILY MEDICINE

## 2024-02-19 PROCEDURE — 94640 AIRWAY INHALATION TREATMENT: CPT

## 2024-02-19 PROCEDURE — 84145 PROCALCITONIN (PCT): CPT | Performed by: INTERNAL MEDICINE

## 2024-02-19 PROCEDURE — 80048 BASIC METABOLIC PNL TOTAL CA: CPT | Performed by: INTERNAL MEDICINE

## 2024-02-19 PROCEDURE — 99232 SBSQ HOSP IP/OBS MODERATE 35: CPT | Performed by: STUDENT IN AN ORGANIZED HEALTH CARE EDUCATION/TRAINING PROGRAM

## 2024-02-19 PROCEDURE — 85027 COMPLETE CBC AUTOMATED: CPT | Performed by: INTERNAL MEDICINE

## 2024-02-19 PROCEDURE — 94760 N-INVAS EAR/PLS OXIMETRY 1: CPT

## 2024-02-19 RX ADMIN — OXYBUTYNIN CHLORIDE 10 MG: 5 TABLET, EXTENDED RELEASE ORAL at 09:32

## 2024-02-19 RX ADMIN — GUAIFENESIN 1200 MG: 600 TABLET, EXTENDED RELEASE ORAL at 21:35

## 2024-02-19 RX ADMIN — DOCUSATE SODIUM 100 MG: 100 CAPSULE, LIQUID FILLED ORAL at 09:32

## 2024-02-19 RX ADMIN — GUAIFENESIN 1200 MG: 600 TABLET, EXTENDED RELEASE ORAL at 09:32

## 2024-02-19 RX ADMIN — METHYLPREDNISOLONE SODIUM SUCCINATE 40 MG: 40 INJECTION, POWDER, FOR SOLUTION INTRAMUSCULAR; INTRAVENOUS at 21:35

## 2024-02-19 RX ADMIN — BENZONATATE 200 MG: 100 CAPSULE ORAL at 21:35

## 2024-02-19 RX ADMIN — BENZONATATE 200 MG: 100 CAPSULE ORAL at 15:58

## 2024-02-19 RX ADMIN — ATORVASTATIN CALCIUM 40 MG: 40 TABLET, FILM COATED ORAL at 17:38

## 2024-02-19 RX ADMIN — BENZONATATE 200 MG: 100 CAPSULE ORAL at 09:32

## 2024-02-19 RX ADMIN — HEPARIN SODIUM 5000 UNITS: 5000 INJECTION INTRAVENOUS; SUBCUTANEOUS at 21:35

## 2024-02-19 RX ADMIN — IPRATROPIUM BROMIDE AND ALBUTEROL SULFATE 3 ML: 2.5; .5 SOLUTION RESPIRATORY (INHALATION) at 13:07

## 2024-02-19 RX ADMIN — CEFTRIAXONE 2000 MG: 2 INJECTION, SOLUTION INTRAVENOUS at 14:51

## 2024-02-19 RX ADMIN — ASPIRIN 81 MG 81 MG: 81 TABLET ORAL at 09:32

## 2024-02-19 RX ADMIN — FAMOTIDINE 20 MG: 20 TABLET, FILM COATED ORAL at 09:32

## 2024-02-19 RX ADMIN — METHYLPREDNISOLONE SODIUM SUCCINATE 40 MG: 40 INJECTION, POWDER, FOR SOLUTION INTRAMUSCULAR; INTRAVENOUS at 09:32

## 2024-02-19 RX ADMIN — HEPARIN SODIUM 5000 UNITS: 5000 INJECTION INTRAVENOUS; SUBCUTANEOUS at 13:50

## 2024-02-19 RX ADMIN — GUAIFENESIN AND DEXTROMETHORPHAN 10 ML: 100; 10 SYRUP ORAL at 14:51

## 2024-02-19 RX ADMIN — SENNOSIDES 8.6 MG: 8.6 TABLET, FILM COATED ORAL at 09:32

## 2024-02-19 RX ADMIN — LEVOTHYROXINE SODIUM 88 MCG: 88 TABLET ORAL at 05:01

## 2024-02-19 RX ADMIN — IPRATROPIUM BROMIDE AND ALBUTEROL SULFATE 3 ML: 2.5; .5 SOLUTION RESPIRATORY (INHALATION) at 07:07

## 2024-02-19 RX ADMIN — HEPARIN SODIUM 5000 UNITS: 5000 INJECTION INTRAVENOUS; SUBCUTANEOUS at 05:01

## 2024-02-19 RX ADMIN — FUROSEMIDE 20 MG: 20 TABLET ORAL at 09:32

## 2024-02-19 RX ADMIN — IPRATROPIUM BROMIDE AND ALBUTEROL SULFATE 3 ML: 2.5; .5 SOLUTION RESPIRATORY (INHALATION) at 19:26

## 2024-02-19 RX ADMIN — DOCUSATE SODIUM 100 MG: 100 CAPSULE, LIQUID FILLED ORAL at 17:38

## 2024-02-19 RX ADMIN — NIFEDIPINE 30 MG: 30 TABLET, EXTENDED RELEASE ORAL at 09:32

## 2024-02-19 NOTE — ASSESSMENT & PLAN NOTE
History of hypertension hypothyroidism OAB presents from [facility] for cough hypoxia shortness of breath found to have multifocal pneumonia and probable ILD  Pulmonary following.  Multifocal pneumonia: Continue Rocephin  ILD possible.  CRP elevated.  Continue 40 mg of IV Solu-Medrol every 12 hours.  On 4 L mid flow    Results from last 7 days   Lab Units 02/19/24  0439 02/18/24  0438 02/17/24  0457 02/14/24  0515   PROCALCITONIN ng/ml <0.05 0.05 0.05 0.05       Results from last 7 days   Lab Units 02/19/24  0439 02/17/24  0457   CRP mg/L 12.1* 75.2*

## 2024-02-19 NOTE — PLAN OF CARE
Problem: Prexisting or High Potential for Compromised Skin Integrity  Goal: Skin integrity is maintained or improved  Description: INTERVENTIONS:  - Identify patients at risk for skin breakdown  - Assess and monitor skin integrity  - Assess and monitor nutrition and hydration status  - Monitor labs   - Assess for incontinence   - Turn and reposition patient  - Assist with mobility/ambulation  - Relieve pressure over bony prominences  - Avoid friction and shearing  - Provide appropriate hygiene as needed including keeping skin clean and dry  - Evaluate need for skin moisturizer/barrier cream  - Collaborate with interdisciplinary team   - Patient/family teaching  - Consider wound care consult   Outcome: Progressing     Problem: SAFETY ADULT  Goal: Patient will remain free of falls  Description: INTERVENTIONS:  - Educate patient/family on patient safety including physical limitations  - Instruct patient to call for assistance with activity   - Consult OT/PT to assist with strengthening/mobility   - Keep Call bell within reach  - Keep bed low and locked with side rails adjusted as appropriate  - Keep care items and personal belongings within reach  - Initiate and maintain comfort rounds  - Make Fall Risk Sign visible to staff  - Offer Toileting every 2 Hours, in advance of need  - Initiate/Maintain bed alarm  - Obtain necessary fall risk management equipment: n/a  - Apply yellow socks and bracelet for high fall risk patients  - Consider moving patient to room near nurses station  Outcome: Progressing     Problem: SAFETY ADULT  Goal: Maintain or return to baseline ADL function  Description: INTERVENTIONS:  -  Assess patient's ability to carry out ADLs; assess patient's baseline for ADL function and identify physical deficits which impact ability to perform ADLs (bathing, care of mouth/teeth, toileting, grooming, dressing, etc.)  - Assess/evaluate cause of self-care deficits   - Assess range of motion  - Assess  patient's mobility; develop plan if impaired  - Assess patient's need for assistive devices and provide as appropriate  - Encourage maximum independence but intervene and supervise when necessary  - Involve family in performance of ADLs  - Assess for home care needs following discharge   - Consider OT consult to assist with ADL evaluation and planning for discharge  - Provide patient education as appropriate  Outcome: Progressing     Problem: SAFETY ADULT  Goal: Maintains/Returns to pre admission functional level  Description: INTERVENTIONS:  - Perform AM-PAC 6 Click Basic Mobility/ Daily Activity assessment daily.  - Set and communicate daily mobility goal to care team and patient/family/caregiver.   - Collaborate with rehabilitation services on mobility goals if consulted  - Perform Range of Motion 3 times a day.  - Reposition patient every 2 hours.  - Dangle patient 3 times a day  - Stand patient 3 times a day  - Ambulate patient 3 times a day  - Out of bed to chair 3 times a day   - Out of bed for meals 3 times a day  - Out of bed for toileting  - Record patient progress and toleration of activity level   Outcome: Progressing     Problem: DISCHARGE PLANNING  Goal: Discharge to home or other facility with appropriate resources  Description: INTERVENTIONS:  - Identify barriers to discharge w/patient and caregiver  - Arrange for needed discharge resources and transportation as appropriate  - Identify discharge learning needs (meds, wound care, etc.)  - Arrange for interpretive services to assist at discharge as needed  - Refer to Case Management Department for coordinating discharge planning if the patient needs post-hospital services based on physician/advanced practitioner order or complex needs related to functional status, cognitive ability, or social support system  Outcome: Progressing     Problem: Knowledge Deficit  Goal: Patient/family/caregiver demonstrates understanding of disease process, treatment plan,  medications, and discharge instructions  Description: Complete learning assessment and assess knowledge base.  Interventions:  - Provide teaching at level of understanding  - Provide teaching via preferred learning methods  Outcome: Progressing     Problem: RESPIRATORY - ADULT  Goal: Achieves optimal ventilation and oxygenation  Description: INTERVENTIONS:  - Assess for changes in respiratory status  - Assess for changes in mentation and behavior  - Position to facilitate oxygenation and minimize respiratory effort  - Oxygen administered by appropriate delivery if ordered  - Initiate smoking cessation education as indicated  - Encourage broncho-pulmonary hygiene including cough, deep breathe, Incentive Spirometry  - Assess the need for suctioning and aspirate as needed  - Assess and instruct to report SOB or any respiratory difficulty  - Respiratory Therapy support as indicated  Outcome: Progressing     Problem: NEUROSENSORY - ADULT  Goal: Achieves stable or improved neurological status  Description: INTERVENTIONS  - Monitor and report changes in neurological status  - Monitor vital signs such as temperature, blood pressure, glucose, and any other labs ordered   - Initiate measures to prevent increased intracranial pressure  - Monitor for seizure activity and implement precautions if appropriate      Outcome: Progressing     Problem: NEUROSENSORY - ADULT  Goal: Remains free of injury related to seizures activity  Description: INTERVENTIONS  - Maintain airway, patient safety  and administer oxygen as ordered  - Monitor patient for seizure activity, document and report duration and description of seizure to physician/advanced practitioner  - If seizure occurs,  ensure patient safety during seizure  - Reorient patient post seizure  - Seizure pads on all 4 side rails  - Instruct patient/family to notify RN of any seizure activity including if an aura is experienced  - Instruct patient/family to call for assistance with  activity based on nursing assessment  - Administer anti-seizure medications if ordered    Outcome: Progressing     Problem: NEUROSENSORY - ADULT  Goal: Achieves maximal functionality and self care  Description: INTERVENTIONS  - Monitor swallowing and airway patency with patient fatigue and changes in neurological status  - Encourage and assist patient to increase activity and self care.   - Encourage visually impaired, hearing impaired and aphasic patients to use assistive/communication devices  Outcome: Progressing     Problem: CARDIOVASCULAR - ADULT  Goal: Maintains optimal cardiac output and hemodynamic stability  Description: INTERVENTIONS:  - Monitor I/O, vital signs and rhythm  - Monitor for S/S and trends of decreased cardiac output  - Administer and titrate ordered vasoactive medications to optimize hemodynamic stability  - Assess quality of pulses, skin color and temperature  - Assess for signs of decreased coronary artery perfusion  - Instruct patient to report change in severity of symptoms  Outcome: Progressing     Problem: CARDIOVASCULAR - ADULT  Goal: Absence of cardiac dysrhythmias or at baseline rhythm  Description: INTERVENTIONS:  - Continuous cardiac monitoring, vital signs, obtain 12 lead EKG if ordered  - Administer antiarrhythmic and heart rate control medications as ordered  - Monitor electrolytes and administer replacement therapy as ordered  Outcome: Progressing

## 2024-02-19 NOTE — CASE MANAGEMENT
Case Management Discharge Planning Note    Patient name Neeraj Palacios  Location [address] [place] [address]/[address] [place] [address]-* MRN [id number]  : 1950 Date 2024       Current Admission Date: 2024  Current Admission Diagnosis:Acute respiratory failure with hypoxia (HCC)   Patient Active Problem List    Diagnosis Date Noted    Stroke-like episode 2024    Hypothyroidism 2024    Acute respiratory failure with hypoxia (HCC) 2024    Hypertension 2024    Swelling of lower leg 2024    Overactive bladder 2024    Balance problem 2024    Aortic ectasia (HCC) 2024      LOS (days): 5  Geometric Mean LOS (GMLOS) (days): 4.1  Days to GMLOS:-1.1     OBJECTIVE:  Risk of Unplanned Readmission Score: 10.31      Current admission status: Inpatient   Preferred Pharmacy:   [facility] [address]  Phone: [phone] Fax: [phone]    Primary Care Provider: Riky Herrmann PA-C    Primary Insurance: MEDICARE  Secondary Insurance:     DISCHARGE DETAILS:    Discharge planning discussed with:: Sister Akila  Freedom of Choice: Yes  Comments - Freedom of Choice: Accepting STR list emailed to sister Akila to review ([email or web address]). Copy also printed and provided to patient bedside.  CM contacted family/caregiver?: Yes    Contacts  Patient Contacts: Akila Locke (sister)  Relationship to Patient:: Family  Contact Method: Phone  Phone Number: [phone]  Reason/Outcome: Emergency Contact, Continuity of Care, Discharge Planning    Treatment Team Recommendation: Short Term Rehab  Discharge Destination Plan:: Short Term Rehab

## 2024-02-19 NOTE — ASSESSMENT & PLAN NOTE
Rapid response/stroke alert was called when patient had strokelike episode reportedly with left facial droop and dysarthria  MRI of brain: No acute CVA.  Marked hydrocephalus.  Seen by neurolo per neurology outpatient [facility]  Neurology recommends continuation of aspirin and atorvastatin

## 2024-02-19 NOTE — PROGRESS NOTES
Neurology Consult Follow Up      Neeraj Palacios is a 73 y.o. male  [address] [address]/[address] [place] [address]-*    [id number]        Assessment/Recommendations:    1.  Strokelike symptoms   2.  CAP  3.  Possible interstitial lung disease  4.  History of hypertension    -Fall risk  -Baby aspirin 81 mg daily  -Atorvastatin 40 mg daily  -Echocardiogram with shunt study with 55% EF  -MRI of the brain completed and negative for acute ischemic findings, show severe hydro with no enlargement of the fourth ventricle, questionable chronic lead in the aqueduct versus NPH  -Lipid profile and A1c  -PT/OT  -Outpatient evaluation by neurosurgery regarding the possibility of NPH    Patient is a 73-year-old male with PMH of HTN, prostate CA who presented from assisted living facility with uncontrollable coughing on 2/14/2024.  He presented with hypoxia and had positive chest x-rays for possible CAP/interstitial edema suggestive of interstitial lung disease for which she initiated pulmonary treatment and antibiotics.  On 2/16/2024, stroke alert was called due to changes in patient including mild left-sided facial drooping with dysarthria.  NIH score 2 at that time.  CT of the head showed positive ventricular apathy with chronic lacunar right cerebellar infarct, chest x-ray showed bilateral pleural effusion, CTA showing groundglass opacities with some improvement since initial admit studies.  Patient was given a full dose aspirin followed by daily baby aspirin and statin on 2/16/2024, was initiated on the stroke pathway for further evaluation.  MRI of the brain was completed and was negative for any acute ischemic event.  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  Consulting neurology MD recommended patient continue on baby aspirin and atorvastatin, suspected transient left facial weakness and dysarthria in the  setting of coughing and hypoxia possibly leading to a recrudescence of his chronic lacunar infarct.  Regarding the abnormal CTh and ventriculomegaly, patient reports balance issues for a long time, he endorses memory impairments however denied any urinary incontinence.  Questionable concerns for NPH versus other etiology therefore referral will be placed to the outpatient NPH clinic upon patient's discharge.  Recommended additional lab works including B12, folate, MMA, TSH, RPR and follow-up treatment.    Spoke with pt at the bedside, he denies having had any neurological changes over the weekend. He noted that he has had facial weakness but was not aware of any chronic CVA either.   Pt has no focal deficits, however, has chronic Left facial asymetry of the eye and mouth.  MRI was negative for acute, however, has chronic lacunar without treatment.  Pt has vascular risk factors including HTN and LDL of 110.   Would recommend continuing AP therapy with baby aspirin and continue with Atorvastatin 40mg daily for now. Can continue to adjust in the outpatient setting as his levels decrease.     Neeraj Palacios will need follow up in 8-10 weeks with general attending or advance practitioner. He will not require outpatient neurological testing.    Chief Complaint:    Subjective:   Pt reports that he has little recollection of any neurological deficits that were reported on 2/16. Pt noted increased discomfort with his breathing and persistent coughing.   Pt denies any changes in his neurological status, biggest complaint is cough.   Past Medical History:   Diagnosis Date    GERD without esophagitis     Hypertension     Hypothyroid     Prostate CA (HCC)      Social History     Socioeconomic History    Marital status:      Spouse name: Not on file    Number of children: Not on file    Years of education: Not on file    Highest education level: Not on file   Occupational History    Not on file   Tobacco Use    Smoking  "status: Former     Types: Cigarettes    Smokeless tobacco: Never   Vaping Use    Vaping status: Never Used   Substance and Sexual Activity    Alcohol use: Yes     Alcohol/week: 1.0 standard drink of alcohol     Types: 1 Cans of beer per week    Drug use: Never    Sexual activity: Not Currently   Other Topics Concern    Not on file   Social History Narrative    Not on file     Social Determinants of Health     Financial Resource Strain: Not on file   Food Insecurity: No Food Insecurity (2/16/2024)    Hunger Vital Sign     Worried About Running Out of Food in the Last Year: Never true     Ran Out of Food in the Last Year: Never true   Transportation Needs: No Transportation Needs (2/16/2024)    PRAPARE - Transportation     Lack of Transportation (Medical): No     Lack of Transportation (Non-Medical): No   Physical Activity: Not on file   Stress: Not on file   Social Connections: Not on file   Intimate Partner Violence: Not on file   Housing Stability: Low Risk  (2/16/2024)    Housing Stability Vital Sign     Unable to Pay for Housing in the Last Year: No     Number of Places Lived in the Last Year: 1     Unstable Housing in the Last Year: No     History reviewed. No pertinent family history.    ROS:  Please see HPI for positive symptoms.   No fever, no chills, no weight change.   Ocular: No diplopia, no blurred vision, spot/zigzag lines   HEENT:  No sore throat, headache or congestion. No neck pain.   COR:  No chest pain. No palpitations.   + pleuritic chest discomfort  Lungs:  no sob + persistent cough  GI:  no  nausea, no vomiting, no diarrhea, no constipation, no anorexia.   :  No dysuria, frequency, or urgency. No hematuria.   Musculoskeletal:  No joint pain or swelling   Skin:  No rash or itching.   Psychiatric:  no anxiety, no depression.   Endocrine:  No polyuria or polydipsia.      Objective:  /73   Pulse 64   Temp 97.6 °F (36.4 °C)   Resp 17   Ht 6' 3\" (1.905 m)   Wt 99.8 kg (220 lb)   SpO2 95%  "  BMI 27.50 kg/m²     General: alert   Mental status: oriented x3  Attention: normal  Knowledge: fair  Language and Speech: normal  Cranial nerves:   CN II: Visual fields are full to confrontation.   Fundoscopic exam is normal with sharp discs and no vascular changes.  Pupils are 3 mm and briskly reactive to light.   CN III, IV, VI: At primary gaze, there is no eye deviation.   CN V: Facial sensation is intact in all 3 divisions bilaterally. Corneal responses are intact.  CN VII: Face is asymmetrical with left facial weakness, normal eye closure and smile.  CN VIII: Hearing is normal to rubbing fingers  CN IX, X: Palate elevates symmetrically. Phonation is normal.  CN XI: Head turning and shoulder shrug are intact  CN XII: Tongue is midline with normal movements and no atrophy.  Motor:  There is no pronator drift of out-stretched arms.   Muscle bulk and tone are normal.      Muscle exam  Arm Right Left Leg Right Left   Deltoid 5/5 5/5 Iliopsoas 5/5 5/5   Biceps 5/5 5/5 Quads 5/5 5/5   Triceps 5/5 5/5 Hamstrings 5/5 5/5   Wrist Extension 5/5 5/5 Ankle Dorsi Flexion 5/5 5/5   Wrist Flexion 5/5 5/5 Ankle Plantar Flexion 5/5 5/5       Sensory: varied sensitivy to light touch, temperature, vibration. Proprioception intact. Decreased cold sense in RLE, decreased vibratory sense to the LLE  Gait: steady  Coordination: finger to nose and heel to toe normal     Reflexes    RJ BJ TJ KJ AJ Plantars Hsu's   Right 2+ 2+  tr tr Downgoing Not present   Left 2+ 2+  2+ 2+ Downgoing Not present      Heart: Regular rate and rhythm  Lung: No audible wheezing or stridor heard  Abd: soft, non-tender, non-distended with positive bowel sounds in all quads  Skin: dry and intact    Labs:      Lab Results   Component Value Date    WBC 10.94 (H) 02/19/2024    HGB 15.5 02/19/2024    HCT 45.6 02/19/2024    MCV 90 02/19/2024     02/19/2024     Lab Results   Component Value Date    HGBA1C 4.9 02/17/2024     Lab Results   Component Value  Date    ALT 8 02/17/2024    AST 18 02/17/2024    ALKPHOS 58 02/17/2024     Lab Results   Component Value Date    CALCIUM 9.5 02/19/2024    K 4.6 02/19/2024    CO2 26 02/19/2024     02/19/2024    BUN 25 02/19/2024    CREATININE 0.85 02/19/2024         Review of reports and notes reveal:   Independent review of films/reports:  XR chest portable    Result Date: 2/18/2024  Moderate pulmonary fibrosis. Superimposed edema not excluded in the appropriate clinical setting. Workstation performed: [id number]     XR chest portable    Result Date: 2/18/2024  Cardiomegaly. Vascular congestion. Bilateral patchy airspace disease. Workstation performed: [id number]     MRI brain wo contrast    Result Date: 2/17/2024  No acute ischemia as clinically questioned. Moderate periventricular white matter change on T2 and FLAIR imaging consistent with chronic microangiopathic change. Marked hydrocephalus involving the lateral ventricles and third ventricle similar to CT scan performed yesterday. 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 Workstation performed: [id number]     CT stroke alert brain    Result Date: 2/16/2024  No acute intracranial abnormality. Chronic microangiopathy. Ventriculomegaly, enlarged out of proportion to degree of cortical volume loss, correlate for NPH. Findings were directly discussed with Janae Buchanan  at 6:51 p.m. Workstation performed: [id number]     CTA stroke alert (head/neck)    Result Date: 2/16/2024  No significant stenosis of the cervical carotid or vertebral arteries No significant intracranial stenosis, large vessel occlusion or aneurysm. Multifocal airspace disease in the upper lungs is stable compared with recent chest CT. Findings were directly discussed with Janae Buchanan  at 6:51 p.m. Workstation performed: [id number]     CT chest wo contrast    Result Date: 2/14/2024  Background emphysema with scattered bulla noted  in the upper lobes. Superimposed mixed groundglass and alveolar opacities bilaterally appear asymmetric. Findings suggest infectious etiology. Pulmonary edema also possible but less likely. If there is continued concern for interstitial lung disease, would recommend high-resolution CT scan after the patient's clinical condition improves as an outpatient. Likely reactive lymph nodes. Fusiform ectasia of the ascending thoracic aorta measuring up to 44 mm. Recommendation is for follow-up low radiation dose chest CT in one year. The study was marked in EPIC for significant notification an follow-up notification at 1 year. Workstation performed: [id number]     XR chest 1 view portable    Result Date: 2/14/2024  Bilateral pulmonary reticulation and possible small bilateral pleural effusions can represent edema. Interstitial lung disease and infection can have a similar appearance. Workstation performed: [id number]         Thank you for this consult.    Total time of encounter:  30 min  More than 50% of the time was used in counseling and/or coordination of care  Extent of couseling and/or coordination of care with patient at bedside, medical team and attending neurology MD.

## 2024-02-19 NOTE — PROGRESS NOTES
[facility]  Progress Note  Name: Neeraj Palacios I  MRN: [id number]  Unit/Bed#: 4 [place] 408-01 I Date of Admission: 2/14/2024   Date of Service: 2/19/2024 I Hospital Day: 5    Assessment/Plan   * Acute respiratory failure with hypoxia (HCC)  Assessment & Plan  History of hypertension hypothyroidism OAB presents from [facility] living for cough hypoxia shortness of breath found to have multifocal pneumonia and probable ILD  Pulmonary following.  Multifocal pneumonia: Continue Rocephin  ILD possible.  CRP elevated.  Continue 40 mg of IV Solu-Medrol every 12 hours.  On 4 L mid flow    Results from last 7 days   Lab Units 02/19/24  0439 02/18/24  0438 02/17/24  0457 02/14/24  0515   PROCALCITONIN ng/ml <0.05 0.05 0.05 0.05       Results from last 7 days   Lab Units 02/19/24  0439 02/17/24  0457   CRP mg/L 12.1* 75.2*         Stroke-like episode  Assessment & Plan  Rapid response/stroke alert was called when patient had strokelike episode reportedly with left facial droop and dysarthria  MRI of brain: No acute CVA.  Marked hydrocephalus.  Seen by neurolo per neurology outpatient [facility]  Neurology recommends continuation of aspirin and atorvastatin    Swelling of lower leg  Assessment & Plan  Echocardiogram without LV dysfunction.  Remains on furosemide.    Hypothyroidism  Assessment & Plan  Continue levothyroxine.    Aortic ectasia (HCC)  Assessment & Plan  Needs outpatient follow-up.    Overactive bladder  Assessment & Plan  Prior to admission on myrbetriq.  Currently on oxybutynin.    Hypertension  Assessment & Plan  Stable, continue nifedipine               VTE Pharmacologic Prophylaxis:    heparin    Mobility:   Basic Mobility Inpatient Raw Score: 7  JH-HLM Goal: 2: Bed activities/Dependent transfer  JH-HLM Achieved: 2: Bed activities/Dependent transfer  HLM Goal achieved. Continue to encourage appropriate mobility.    Patient Centered Rounds: I performed  bedside rounds with nursing staff today.   Discussions with Specialists or Other Care Team Provider: yes - pulm    Education and Discussions with Family / Patient: Updated  (sister) via phone.    Total Time Spent on Date of Encounter in care of patient: This time was spent on one or more of the following: performing physical exam; counseling and coordination of care; obtaining or reviewing history; documenting in the medical record; reviewing/ordering tests, medications or procedures; communicating with other healthcare professionals and discussing with patient's family/caregivers.    Current Length of Stay: 5 day(s)  Current Patient Status: Inpatient   Certification Statement: The patient will continue to require additional inpatient hospital stay due to acute respiratory failure with hypoxia requiring IV steroids and additional workup  Discharge Plan: Anticipate discharge in >72 hrs to discharge location to be determined pending rehab evaluations.    Code Status: Level 1 - Full Code    Subjective:     Patient reports slow improvement in his breathing.  Reports shortness of breath mostly with exertion    Objective:     Vitals:   Temp (24hrs), Av.9 °F (36.6 °C), Min:97.6 °F (36.4 °C), Max:98.2 °F (36.8 °C)    Temp:  [97.6 °F (36.4 °C)-98.2 °F (36.8 °C)] 97.6 °F (36.4 °C)  HR:  [64-73] 64  Resp:  [16-20] 17  BP: (127-135)/(73-79) 127/73  SpO2:  [91 %-98 %] 95 %  Body mass index is 27.5 kg/m².     Input and Output Summary (last 24 hours):     Intake/Output Summary (Last 24 hours) at 2024 1245  Last data filed at 2024 0601  Gross per 24 hour   Intake 560 ml   Output 600 ml   Net -40 ml       Physical Exam:   Physical Exam  Vitals reviewed.   Constitutional:       General: He is not in acute distress.     Appearance: He is not toxic-appearing.   HENT:      Head: Normocephalic and atraumatic.   Eyes:      General:         Right eye: No discharge.         Left eye: No discharge.    Cardiovascular:      Rate and Rhythm: Normal rate and regular rhythm.   Pulmonary:      Effort: Pulmonary effort is normal. No respiratory distress.      Breath sounds: No wheezing.      Comments: Some crackles noted bilaterally  Abdominal:      General: Bowel sounds are normal. There is no distension.      Palpations: Abdomen is soft.      Tenderness: There is no abdominal tenderness.   Neurological:      Mental Status: He is alert and oriented to person, place, and time.          Additional Data:     Labs:  Results from last 7 days   Lab Units 02/19/24 0439 02/17/24 0457 02/16/24  0500   WBC Thousand/uL 10.94*   < > 7.44   HEMOGLOBIN g/dL 15.5   < > 14.8   HEMATOCRIT % 45.6   < > 42.7   PLATELETS Thousands/uL 260   < > 218   NEUTROS PCT %  --   --  61   LYMPHS PCT %  --   --  14   MONOS PCT %  --   --  12   EOS PCT %  --   --  12*    < > = values in this interval not displayed.     Results from last 7 days   Lab Units 02/19/24 0439 02/18/24 0438 02/17/24 0457   SODIUM mmol/L 137   < > 137   POTASSIUM mmol/L 4.6   < > 4.6   CHLORIDE mmol/L 104   < > 106   CO2 mmol/L 26   < > 22   BUN mg/dL 25   < > 18   CREATININE mg/dL 0.85   < > 0.87   ANION GAP mmol/L 7   < > 9   CALCIUM mg/dL 9.5   < > 9.2   ALBUMIN g/dL  --   --  3.6   TOTAL BILIRUBIN mg/dL  --   --  0.56   ALK PHOS U/L  --   --  58   ALT U/L  --   --  8   AST U/L  --   --  18   GLUCOSE RANDOM mg/dL 108   < > 131    < > = values in this interval not displayed.         Results from last 7 days   Lab Units 02/16/24  1803   POC GLUCOSE mg/dl 156*     Results from last 7 days   Lab Units 02/17/24 0457   HEMOGLOBIN A1C % 4.9     Results from last 7 days   Lab Units 02/19/24 0439 02/18/24 0438 02/17/24 0457 02/14/24  0515   LACTIC ACID mmol/L  --   --   --  1.7   PROCALCITONIN ng/ml <0.05 0.05 0.05 0.05       Lines/Drains:  Invasive Devices       Peripheral Intravenous Line  Duration             Peripheral IV 02/17/24 Right;Ventral (anterior) Wrist 1 day               Drain  Duration             External Urinary Catheter Medium 2 days                          Imaging: Reviewed radiology reports from this admission including: chest xray    Recent Cultures (last 7 days):   Results from last 7 days   Lab Units 02/14/24  1809 02/14/24  0516 02/14/24  0515   BLOOD CULTURE   --  No Growth After 5 Days. No Growth After 5 Days.   LEGIONELLA URINARY ANTIGEN  Negative  --   --        Last 24 Hours Medication List:   Current Facility-Administered Medications   Medication Dose Route Frequency Provider Last Rate    acetaminophen  650 mg Oral Q6H PRN ROXY Wright      aspirin  81 mg Oral Daily ROXY Muniz      atorvastatin  40 mg Oral QPM ROXY Muniz      benzonatate  200 mg Oral TID Salty Morin DO      cefTRIAXone  2,000 mg Intravenous Q24H ROXY Abbott 2,000 mg (02/18/24 1435)    dextromethorphan-guaiFENesin  10 mL Oral Q4H PRN Salty Morin DO      docusate sodium  100 mg Oral BID ROXY Wright      famotidine  20 mg Oral Daily Sofia ROXY Jorgensen      furosemide  20 mg Oral Daily Sofia ROXY Jorgensen      guaiFENesin  1,200 mg Oral Q12H [facility] ROXY Abbott      heparin (porcine)  5,000 Units Subcutaneous Q8H [facility] ROXY Wright      ipratropium-albuterol  3 mL Nebulization TID Ximena Kennedy MD      levothyroxine  88 mcg Oral Early Morning ROXY Wright      methylPREDNISolone sodium succinate  40 mg Intravenous Q12H [facility] Ximena Kennedy MD      NIFEdipine  30 mg Oral Daily ROXY Wright      oxybutynin  10 mg Oral Daily ROXY Wright      senna  1 tablet Oral Daily ROXY Wright          Today, Patient Was Seen By: Sheri Bobby DO    **Please Note: This note may have been constructed using a voice recognition system.**

## 2024-02-19 NOTE — PLAN OF CARE
Problem: Prexisting or High Potential for Compromised Skin Integrity  Goal: Skin integrity is maintained or improved  Description: INTERVENTIONS:  - Identify patients at risk for skin breakdown  - Assess and monitor skin integrity  - Assess and monitor nutrition and hydration status  - Monitor labs   - Assess for incontinence   - Turn and reposition patient  - Assist with mobility/ambulation  - Relieve pressure over bony prominences  - Avoid friction and shearing  - Provide appropriate hygiene as needed including keeping skin clean and dry  - Evaluate need for skin moisturizer/barrier cream  - Collaborate with interdisciplinary team   - Patient/family teaching  - Consider wound care consult   Outcome: Progressing     Problem: SAFETY ADULT  Goal: Patient will remain free of falls  Description: INTERVENTIONS:  - Educate patient/family on patient safety including physical limitations  - Instruct patient to call for assistance with activity   - Consult OT/PT to assist with strengthening/mobility   - Keep Call bell within reach  - Keep bed low and locked with side rails adjusted as appropriate  - Keep care items and personal belongings within reach  - Initiate and maintain comfort rounds  - Make Fall Risk Sign visible to staff  - Offer Toileting every 2 Hours, in advance of need  - Initiate/Maintain bed alarm  - Obtain necessary fall risk management equipment: n/a  - Apply yellow socks and bracelet for high fall risk patients  - Consider moving patient to room near nurses station  Outcome: Progressing  Goal: Maintain or return to baseline ADL function  Description: INTERVENTIONS:  -  Assess patient's ability to carry out ADLs; assess patient's baseline for ADL function and identify physical deficits which impact ability to perform ADLs (bathing, care of mouth/teeth, toileting, grooming, dressing, etc.)  - Assess/evaluate cause of self-care deficits   - Assess range of motion  - Assess patient's mobility; develop plan  if impaired  - Assess patient's need for assistive devices and provide as appropriate  - Encourage maximum independence but intervene and supervise when necessary  - Involve family in performance of ADLs  - Assess for home care needs following discharge   - Consider OT consult to assist with ADL evaluation and planning for discharge  - Provide patient education as appropriate  Outcome: Progressing  Goal: Maintains/Returns to pre admission functional level  Description: INTERVENTIONS:  - Perform AM-PAC 6 Click Basic Mobility/ Daily Activity assessment daily.  - Set and communicate daily mobility goal to care team and patient/family/caregiver.   - Collaborate with rehabilitation services on mobility goals if consulted  - Perform Range of Motion 3 times a day.  - Reposition patient every 2 hours.  - Dangle patient 3 times a day  - Stand patient 3 times a day  - Ambulate patient 3 times a day  - Out of bed to chair 3 times a day   - Out of bed for meals 3 times a day  - Out of bed for toileting  - Record patient progress and toleration of activity level   Outcome: Progressing     Problem: RESPIRATORY - ADULT  Goal: Achieves optimal ventilation and oxygenation  Description: INTERVENTIONS:  - Assess for changes in respiratory status  - Assess for changes in mentation and behavior  - Position to facilitate oxygenation and minimize respiratory effort  - Oxygen administered by appropriate delivery if ordered  - Initiate smoking cessation education as indicated  - Encourage broncho-pulmonary hygiene including cough, deep breathe, Incentive Spirometry  - Assess the need for suctioning and aspirate as needed  - Assess and instruct to report SOB or any respiratory difficulty  - Respiratory Therapy support as indicated  Outcome: Progressing     Problem: NEUROSENSORY - ADULT  Goal: Achieves stable or improved neurological status  Description: INTERVENTIONS  - Monitor and report changes in neurological status  - Monitor vital signs  such as temperature, blood pressure, glucose, and any other labs ordered   - Initiate measures to prevent increased intracranial pressure  - Monitor for seizure activity and implement precautions if appropriate      Outcome: Progressing  Goal: Remains free of injury related to seizures activity  Description: INTERVENTIONS  - Maintain airway, patient safety  and administer oxygen as ordered  - Monitor patient for seizure activity, document and report duration and description of seizure to physician/advanced practitioner  - If seizure occurs,  ensure patient safety during seizure  - Reorient patient post seizure  - Seizure pads on all 4 side rails  - Instruct patient/family to notify RN of any seizure activity including if an aura is experienced  - Instruct patient/family to call for assistance with activity based on nursing assessment  - Administer anti-seizure medications if ordered    Outcome: Progressing  Goal: Achieves maximal functionality and self care  Description: INTERVENTIONS  - Monitor swallowing and airway patency with patient fatigue and changes in neurological status  - Encourage and assist patient to increase activity and self care.   - Encourage visually impaired, hearing impaired and aphasic patients to use assistive/communication devices  Outcome: Progressing     Problem: CARDIOVASCULAR - ADULT  Goal: Maintains optimal cardiac output and hemodynamic stability  Description: INTERVENTIONS:  - Monitor I/O, vital signs and rhythm  - Monitor for S/S and trends of decreased cardiac output  - Administer and titrate ordered vasoactive medications to optimize hemodynamic stability  - Assess quality of pulses, skin color and temperature  - Assess for signs of decreased coronary artery perfusion  - Instruct patient to report change in severity of symptoms  Outcome: Progressing  Goal: Absence of cardiac dysrhythmias or at baseline rhythm  Description: INTERVENTIONS:  - Continuous cardiac monitoring, vital  signs, obtain 12 lead EKG if ordered  - Administer antiarrhythmic and heart rate control medications as ordered  - Monitor electrolytes and administer replacement therapy as ordered  Outcome: Progressing     Problem: DISCHARGE PLANNING  Goal: Discharge to home or other facility with appropriate resources  Description: INTERVENTIONS:  - Identify barriers to discharge w/patient and caregiver  - Arrange for needed discharge resources and transportation as appropriate  - Identify discharge learning needs (meds, wound care, etc.)  - Arrange for interpretive services to assist at discharge as needed  - Refer to Case Management Department for coordinating discharge planning if the patient needs post-hospital services based on physician/advanced practitioner order or complex needs related to functional status, cognitive ability, or social support system  Outcome: Progressing     Problem: Knowledge Deficit  Goal: Patient/family/caregiver demonstrates understanding of disease process, treatment plan, medications, and discharge instructions  Description: Complete learning assessment and assess knowledge base.  Interventions:  - Provide teaching at level of understanding  - Provide teaching via preferred learning methods  Outcome: Progressing

## 2024-02-19 NOTE — PROGRESS NOTES
"Progress Note - Pulmonary   Neeraj Palacios 73 y.o. male MRN: [id number]  Unit/Bed#: [address] [place] [address] Encounter: [id number]    Assessment/Plan:    Acute hypoxic respiratory failure, suspect due to community-acquired pneumonia   Titrate oxygen to maintain POX > or = 89%.    Activity as tolerated.  Would benefit from being out of bed to the chair.   Will need formal oxygen qualification prior to discharge.   Was weaned from 6 to 5 L while at bedside.    Abnormal CT chest with groundglass opacities concerning for community-acquired pneumonia   Complete course of ceftriaxone.   Repeat imaging in 6 to 8 weeks.    Emphysema/suspected COPD   Continue DuoNebs 3 times daily while inpatient.   Continue Solu-Medrol 40 mg IV every 12 hours today and hopeful taper down tomorrow if continues to improve.    Probable ILD   No inpatient workup at this time.   Outpatient pulmonary follow-up as below.    Nicotine dependence in remission   Quit in 2000.   Continued complete cessation.    Outpatient follow-up pending course.  Discussed with primary team and nursing.    Chief Complaint:    \"I feel better.\"    Subjective:    Neeraj is sitting up in bed.  He reports he feels better.  He reports it is difficult to tell exactly however because he has not been out of bed.  He recently sold his home and is living at [facility].  To get around the facility, he reports he is largely using a wheelchair.  It has been this way for a few months.    Objective:    Vitals: Blood pressure 127/73, pulse 64, temperature 97.6 °F (36.4 °C), resp. rate 17, height 6' 3\" (1.905 m), weight 99.8 kg (220 lb), SpO2 95%.5L NC,Body mass index is 27.5 kg/m².      Intake/Output Summary (Last 24 hours) at 2/19/2024 0919  Last data filed at 2/19/2024 0601  Gross per 24 hour   Intake 560 ml   Output 600 ml   Net -40 ml       Invasive Devices       Peripheral Intravenous Line  Duration             Peripheral IV 02/17/24 Right;Ventral (anterior) Wrist 1 day              " Drain  Duration             External Urinary Catheter Medium 2 days                    Physical Exam:     Physical Exam  Vitals reviewed.   Constitutional:       General: He is not in acute distress.     Appearance: He is well-developed. He is not toxic-appearing or diaphoretic.      Interventions: Nasal cannula in place.   HENT:      Head: Normocephalic and atraumatic.   Eyes:      General: No scleral icterus.  Neck:      Trachea: No tracheal deviation.   Cardiovascular:      Rate and Rhythm: Normal rate and regular rhythm.      Heart sounds: S1 normal and S2 normal. No murmur heard.     No friction rub. No gallop.   Pulmonary:      Effort: Pulmonary effort is normal. No tachypnea, accessory muscle usage or respiratory distress.      Breath sounds: Normal breath sounds. No stridor. No decreased breath sounds, wheezing, rhonchi or rales.   Chest:      Chest wall: No tenderness.   Abdominal:      General: Bowel sounds are normal. There is no distension.      Palpations: Abdomen is soft.      Tenderness: There is no abdominal tenderness.   Musculoskeletal:         General: No tenderness.      Cervical back: Neck supple.      Right lower leg: No edema.      Left lower leg: No edema.   Skin:     General: Skin is warm and dry.      Findings: No rash.   Neurological:      Mental Status: He is alert and oriented to person, place, and time.      GCS: GCS eye subscore is 4. GCS verbal subscore is 5. GCS motor subscore is 6.   Psychiatric:         Speech: Speech normal.         Behavior: Behavior normal. Behavior is cooperative.       Labs: CBC:   Lab Results   Component Value Date    WBC 10.94 (H) 02/19/2024    HGB 15.5 02/19/2024    HCT 45.6 02/19/2024    MCV 90 02/19/2024     02/19/2024    RBC 5.06 02/19/2024    MCH 30.6 02/19/2024    MCHC 34.0 02/19/2024    RDW 13.6 02/19/2024    MPV 9.2 02/19/2024   , CMP:   Lab Results   Component Value Date    SODIUM 137 02/19/2024    K 4.6 02/19/2024     02/19/2024    CO2  26 02/19/2024    BUN 25 02/19/2024    CREATININE 0.85 02/19/2024    CALCIUM 9.5 02/19/2024    EGFR 85 02/19/2024     Procalcitonin <0.05    CRP 12.1    Imaging and other studies:  None today

## 2024-02-20 LAB
ANA SER QL IA: POSITIVE
ANION GAP SERPL CALCULATED.3IONS-SCNC: 11 MMOL/L
BUN SERPL-MCNC: 25 MG/DL (ref 5–25)
CALCIUM SERPL-MCNC: 9.7 MG/DL (ref 8.4–10.2)
CCP AB SER IA-ACNC: 2.4
CHLORIDE SERPL-SCNC: 106 MMOL/L (ref 96–108)
CK SERPL-CCNC: 44 U/L (ref 39–308)
CO2 SERPL-SCNC: 21 MMOL/L (ref 21–32)
CREAT SERPL-MCNC: 0.91 MG/DL (ref 0.6–1.3)
GFR SERPL CREATININE-BSD FRML MDRD: 82 ML/MIN/1.73SQ M
GLUCOSE SERPL-MCNC: 111 MG/DL (ref 65–140)
POTASSIUM SERPL-SCNC: 4.7 MMOL/L (ref 3.5–5.3)
RHEUMATOID FACT SER QL LA: NEGATIVE
SODIUM SERPL-SCNC: 138 MMOL/L (ref 135–147)

## 2024-02-20 PROCEDURE — 82550 ASSAY OF CK (CPK): CPT | Performed by: FAMILY MEDICINE

## 2024-02-20 PROCEDURE — 83520 IMMUNOASSAY QUANT NOS NONAB: CPT | Performed by: FAMILY MEDICINE

## 2024-02-20 PROCEDURE — 86037 ANCA TITER EACH ANTIBODY: CPT | Performed by: FAMILY MEDICINE

## 2024-02-20 PROCEDURE — 94640 AIRWAY INHALATION TREATMENT: CPT

## 2024-02-20 PROCEDURE — 86200 CCP ANTIBODY: CPT | Performed by: FAMILY MEDICINE

## 2024-02-20 PROCEDURE — 99232 SBSQ HOSP IP/OBS MODERATE 35: CPT | Performed by: FAMILY MEDICINE

## 2024-02-20 PROCEDURE — 92526 ORAL FUNCTION THERAPY: CPT

## 2024-02-20 PROCEDURE — 86225 DNA ANTIBODY NATIVE: CPT | Performed by: FAMILY MEDICINE

## 2024-02-20 PROCEDURE — 86038 ANTINUCLEAR ANTIBODIES: CPT | Performed by: FAMILY MEDICINE

## 2024-02-20 PROCEDURE — 94760 N-INVAS EAR/PLS OXIMETRY 1: CPT

## 2024-02-20 PROCEDURE — 80048 BASIC METABOLIC PNL TOTAL CA: CPT | Performed by: FAMILY MEDICINE

## 2024-02-20 PROCEDURE — 99232 SBSQ HOSP IP/OBS MODERATE 35: CPT | Performed by: STUDENT IN AN ORGANIZED HEALTH CARE EDUCATION/TRAINING PROGRAM

## 2024-02-20 PROCEDURE — 82085 ASSAY OF ALDOLASE: CPT | Performed by: FAMILY MEDICINE

## 2024-02-20 PROCEDURE — 86430 RHEUMATOID FACTOR TEST QUAL: CPT | Performed by: FAMILY MEDICINE

## 2024-02-20 PROCEDURE — 97110 THERAPEUTIC EXERCISES: CPT

## 2024-02-20 PROCEDURE — 86039 ANTINUCLEAR ANTIBODIES (ANA): CPT | Performed by: FAMILY MEDICINE

## 2024-02-20 RX ADMIN — METHYLPREDNISOLONE SODIUM SUCCINATE 40 MG: 40 INJECTION, POWDER, FOR SOLUTION INTRAMUSCULAR; INTRAVENOUS at 21:28

## 2024-02-20 RX ADMIN — IPRATROPIUM BROMIDE AND ALBUTEROL SULFATE 3 ML: 2.5; .5 SOLUTION RESPIRATORY (INHALATION) at 13:52

## 2024-02-20 RX ADMIN — HEPARIN SODIUM 5000 UNITS: 5000 INJECTION INTRAVENOUS; SUBCUTANEOUS at 21:28

## 2024-02-20 RX ADMIN — METHYLPREDNISOLONE SODIUM SUCCINATE 40 MG: 40 INJECTION, POWDER, FOR SOLUTION INTRAMUSCULAR; INTRAVENOUS at 10:14

## 2024-02-20 RX ADMIN — IPRATROPIUM BROMIDE AND ALBUTEROL SULFATE 3 ML: 2.5; .5 SOLUTION RESPIRATORY (INHALATION) at 19:17

## 2024-02-20 RX ADMIN — GUAIFENESIN 1200 MG: 600 TABLET, EXTENDED RELEASE ORAL at 10:15

## 2024-02-20 RX ADMIN — ATORVASTATIN CALCIUM 40 MG: 40 TABLET, FILM COATED ORAL at 18:11

## 2024-02-20 RX ADMIN — LEVOTHYROXINE SODIUM 88 MCG: 88 TABLET ORAL at 05:04

## 2024-02-20 RX ADMIN — ASPIRIN 81 MG 81 MG: 81 TABLET ORAL at 10:16

## 2024-02-20 RX ADMIN — FAMOTIDINE 20 MG: 20 TABLET, FILM COATED ORAL at 10:15

## 2024-02-20 RX ADMIN — HEPARIN SODIUM 5000 UNITS: 5000 INJECTION INTRAVENOUS; SUBCUTANEOUS at 05:04

## 2024-02-20 RX ADMIN — GUAIFENESIN AND DEXTROMETHORPHAN 10 ML: 100; 10 SYRUP ORAL at 04:27

## 2024-02-20 RX ADMIN — GUAIFENESIN 1200 MG: 600 TABLET, EXTENDED RELEASE ORAL at 21:28

## 2024-02-20 RX ADMIN — DOCUSATE SODIUM 100 MG: 100 CAPSULE, LIQUID FILLED ORAL at 18:11

## 2024-02-20 RX ADMIN — BENZONATATE 200 MG: 100 CAPSULE ORAL at 16:37

## 2024-02-20 RX ADMIN — HEPARIN SODIUM 5000 UNITS: 5000 INJECTION INTRAVENOUS; SUBCUTANEOUS at 16:37

## 2024-02-20 RX ADMIN — FUROSEMIDE 20 MG: 20 TABLET ORAL at 10:15

## 2024-02-20 RX ADMIN — BENZONATATE 200 MG: 100 CAPSULE ORAL at 21:28

## 2024-02-20 RX ADMIN — DOCUSATE SODIUM 100 MG: 100 CAPSULE, LIQUID FILLED ORAL at 10:16

## 2024-02-20 RX ADMIN — BENZONATATE 200 MG: 100 CAPSULE ORAL at 10:14

## 2024-02-20 RX ADMIN — SENNOSIDES 8.6 MG: 8.6 TABLET, FILM COATED ORAL at 10:14

## 2024-02-20 RX ADMIN — OXYBUTYNIN CHLORIDE 10 MG: 5 TABLET, EXTENDED RELEASE ORAL at 10:15

## 2024-02-20 RX ADMIN — IPRATROPIUM BROMIDE AND ALBUTEROL SULFATE 3 ML: 2.5; .5 SOLUTION RESPIRATORY (INHALATION) at 07:06

## 2024-02-20 RX ADMIN — NIFEDIPINE 30 MG: 30 TABLET, EXTENDED RELEASE ORAL at 10:15

## 2024-02-20 RX ADMIN — CEFTRIAXONE 2000 MG: 2 INJECTION, SOLUTION INTRAVENOUS at 16:37

## 2024-02-20 NOTE — ASSESSMENT & PLAN NOTE
Rapid response/stroke alert was called when patient had strokelike episode reportedly with left facial droop and dysarthria  MRI of brain: No acute CVA.  Marked hydrocephalus  Seen by neurolo per neurology outpatient neurosurgery follow-up at [facility] clinic  Neurology recommends continuation of aspirin and atorvastatin

## 2024-02-20 NOTE — PROGRESS NOTES
[facility]  Progress Note  Name: Neeraj Palacios I  MRN: [id number]  Unit/Bed#: [address]01 I Date of Admission: 2/14/2024   Date of Service: 2/20/2024 I Hospital Day: 6    Assessment/Plan   * Acute respiratory failure with hypoxia (HCC)  Assessment & Plan  History of hypertension hypothyroidism OAB presents from [facility] for cough hypoxia shortness of breath found to have multifocal pneumonia and probable ILD  Pulmonary following.  Multifocal pneumonia: Completed Rocephin  Likely due to ILD flare.  CRP elevated but trending down.  Continue 40 mg of IV Solu-Medrol every 12 hours.  Oxygen requirement fluctuates and per staff, does desaturate significantly with minimal exertion  TRISTAN positive but reflex titer pending. ANCA, RF, CCP, aldolase, HP panel, myositis panel pending    Results from last 7 days   Lab Units 02/19/24  0439 02/18/24  0438 02/17/24  0457 02/14/24  0515   PROCALCITONIN ng/ml <0.05 0.05 0.05 0.05       Results from last 7 days   Lab Units 02/19/24  0439 02/17/24  0457   CRP mg/L 12.1* 75.2*         Stroke-like episode  Assessment & Plan  Rapid response/stroke alert was called when patient had strokelike episode reportedly with left facial droop and dysarthria  MRI of brain: No acute CVA.  Marked hydrocephalus  Seen by neurolo per neurology outpatient neurosurgery follow-up at [facility] clinic  Neurology recommends continuation of aspirin and atorvastatin    Swelling of lower leg  Assessment & Plan  Echocardiogram without LV dysfunction.  Continue furosemide.    Hypothyroidism  Assessment & Plan  Continue levothyroxine    Aortic ectasia (HCC)  Assessment & Plan  Needs outpatient follow-up    Overactive bladder  Assessment & Plan  Prior to admission on myrbetriq.  Currently on oxybutynin    Hypertension  Assessment & Plan  Stable, continue nifedipine.               VTE Pharmacologic Prophylaxis:    heparin    Mobility:   Basic Mobility Inpatient Raw Score: 7  JH-HLM  Goal: 2: Bed activities/Dependent transfer  JH-HLM Achieved: 2: Bed activities/Dependent transfer  HLM Goal achieved. Continue to encourage appropriate mobility.    Patient Centered Rounds: I performed bedside rounds with nursing staff today.   Discussions with Specialists or Other Care Team Provider: yes - pulm    Education and Discussions with Family / Patient: Attempted to update  (sister) via phone. Left voicemail.     Total Time Spent on Date of Encounter in care of patient: This time was spent on one or more of the following: performing physical exam; counseling and coordination of care; obtaining or reviewing history; documenting in the medical record; reviewing/ordering tests, medications or procedures; communicating with other healthcare professionals and discussing with patient's family/caregivers.    Current Length of Stay: 6 day(s)  Current Patient Status: Inpatient   Certification Statement: The patient will continue to require additional inpatient hospital stay due to acute respiratory failure with hypoxia still with high oxygen requirement and easily desaturates with minimal exertion requiring IV steroids and further workup  Discharge Plan: Anticipate discharge in >72 hrs to discharge location to be determined pending rehab evaluations.    Code Status: Level 1 - Full Code    Subjective:     Patient continues to report slow improvement in breathing.  Per staff patient easily desaturates with minimal exertion.  Patient does report worsening of his breathing with exertion    Objective:     Vitals:   Temp (24hrs), Av.7 °F (36.5 °C), Min:97.5 °F (36.4 °C), Max:97.9 °F (36.6 °C)    Temp:  [97.5 °F (36.4 °C)-97.9 °F (36.6 °C)] 97.7 °F (36.5 °C)  HR:  [63-71] 71  Resp:  [18-20] 18  BP: (126-128)/(70-71) 126/70  SpO2:  [85 %-94 %] 90 %  Body mass index is 27.2 kg/m².     Input and Output Summary (last 24 hours):     Intake/Output Summary (Last 24 hours) at 2024 1026  Last data filed at  2/19/2024 2301  Gross per 24 hour   Intake 260 ml   Output 1300 ml   Net -1040 ml       Physical Exam:   Physical Exam  Vitals reviewed.   Constitutional:       General: He is not in acute distress.     Appearance: He is ill-appearing.   HENT:      Head: Normocephalic and atraumatic.   Eyes:      General:         Right eye: No discharge.         Left eye: No discharge.   Cardiovascular:      Rate and Rhythm: Normal rate and regular rhythm.   Pulmonary:      Effort: Pulmonary effort is normal. No respiratory distress.      Breath sounds: No wheezing or rales.      Comments: Decreased breath sounds bilaterally.  Some crackles noted  Abdominal:      General: Bowel sounds are normal. There is no distension.      Palpations: Abdomen is soft.      Tenderness: There is no abdominal tenderness.   Musculoskeletal:      Comments: Trace edema bilateral lower extremity   Neurological:      Mental Status: He is alert.          Additional Data:     Labs:  Results from last 7 days   Lab Units 02/19/24  0439 02/17/24  0457 02/16/24  0500   WBC Thousand/uL 10.94*   < > 7.44   HEMOGLOBIN g/dL 15.5   < > 14.8   HEMATOCRIT % 45.6   < > 42.7   PLATELETS Thousands/uL 260   < > 218   NEUTROS PCT %  --   --  61   LYMPHS PCT %  --   --  14   MONOS PCT %  --   --  12   EOS PCT %  --   --  12*    < > = values in this interval not displayed.     Results from last 7 days   Lab Units 02/20/24  0424 02/18/24  0438 02/17/24  0457   SODIUM mmol/L 138   < > 137   POTASSIUM mmol/L 4.7   < > 4.6   CHLORIDE mmol/L 106   < > 106   CO2 mmol/L 21   < > 22   BUN mg/dL 25   < > 18   CREATININE mg/dL 0.91   < > 0.87   ANION GAP mmol/L 11   < > 9   CALCIUM mg/dL 9.7   < > 9.2   ALBUMIN g/dL  --   --  3.6   TOTAL BILIRUBIN mg/dL  --   --  0.56   ALK PHOS U/L  --   --  58   ALT U/L  --   --  8   AST U/L  --   --  18   GLUCOSE RANDOM mg/dL 111   < > 131    < > = values in this interval not displayed.         Results from last 7 days   Lab Units 02/16/24  1184    POC GLUCOSE mg/dl 156*     Results from last 7 days   Lab Units 02/17/24  0457   HEMOGLOBIN A1C % 4.9     Results from last 7 days   Lab Units 02/19/24  0439 02/18/24  0438 02/17/24  0457 02/14/24  0515   LACTIC ACID mmol/L  --   --   --  1.7   PROCALCITONIN ng/ml <0.05 0.05 0.05 0.05       Lines/Drains:  Invasive Devices       Peripheral Intravenous Line  Duration             Peripheral IV 02/17/24 Right;Ventral (anterior) Wrist 2 days              Drain  Duration             External Urinary Catheter Small <1 day                          Imaging: No pertinent imaging reviewed.    Recent Cultures (last 7 days):   Results from last 7 days   Lab Units 02/14/24  1809 02/14/24  0516 02/14/24  0515   BLOOD CULTURE   --  No Growth After 5 Days. No Growth After 5 Days.   LEGIONELLA URINARY ANTIGEN  Negative  --   --        Last 24 Hours Medication List:   Current Facility-Administered Medications   Medication Dose Route Frequency Provider Last Rate    acetaminophen  650 mg Oral Q6H PRN ROXY Wright      aspirin  81 mg Oral Daily ROXY Muniz      atorvastatin  40 mg Oral QPM ROXY Muniz      benzonatate  200 mg Oral TID Salty Morin DO      cefTRIAXone  2,000 mg Intravenous Q24H ROXY Abbott 2,000 mg (02/19/24 1451)    dextromethorphan-guaiFENesin  10 mL Oral Q4H PRN Salty Morin DO      docusate sodium  100 mg Oral BID ROXY Wright      famotidine  20 mg Oral Daily ROXY Wright      furosemide  20 mg Oral Daily ROXY Wright      guaiFENesin  1,200 mg Oral Q12H OBBBI ROXY Abbott      heparin (porcine)  5,000 Units Subcutaneous Q8H [facility] ROXY Wright      ipratropium-albuterol  3 mL Nebulization TID Ximena Kennedy MD      levothyroxine  88 mcg Oral Early Morning ROXY Wright      methylPREDNISolone sodium succinate  40 mg Intravenous Q12H [facility] Ximena Kennedy MD      NIFEdipine  30 mg Oral Daily ROXY Wright       oxybutynin  10 mg Oral Daily ROXY Wright      senna  1 tablet Oral Daily ROXY Wright          Today, Patient Was Seen By: Sheri Bobby DO    **Please Note: This note may have been constructed using a voice recognition system.**

## 2024-02-20 NOTE — OCCUPATIONAL THERAPY NOTE
Occupational Therapy Cancel Note     Patient Name: Neeraj Palacios  Today's Date: 2/20/2024  Problem List  Principal Problem:    Acute respiratory failure with hypoxia (HCC)  Active Problems:    Hypertension    Swelling of lower leg    Overactive bladder    Aortic ectasia (HCC)    Stroke-like episode    Hypothyroidism       02/20/24 1440   Note Type   Note Type Cancelled Session  (Tuesday 2/20/24)   Cancel Reasons Medical status  (spoke w/ PT, Giselle. Pt is not appropriate to participate in OT due to decreased activity tolerance and O2 sats w/ minimal exertion. Will continue to follow)     Ciera Villeda, OTR/L  [id number]  [id number]

## 2024-02-20 NOTE — PLAN OF CARE
Problem: Prexisting or High Potential for Compromised Skin Integrity  Goal: Skin integrity is maintained or improved  Description: INTERVENTIONS:  - Identify patients at risk for skin breakdown  - Assess and monitor skin integrity  - Assess and monitor nutrition and hydration status  - Monitor labs   - Assess for incontinence   - Turn and reposition patient  - Assist with mobility/ambulation  - Relieve pressure over bony prominences  - Avoid friction and shearing  - Provide appropriate hygiene as needed including keeping skin clean and dry  - Evaluate need for skin moisturizer/barrier cream  - Collaborate with interdisciplinary team   - Patient/family teaching  - Consider wound care consult   Outcome: Progressing     Problem: SAFETY ADULT  Goal: Patient will remain free of falls  Description: INTERVENTIONS:  - Educate patient/family on patient safety including physical limitations  - Instruct patient to call for assistance with activity   - Consult OT/PT to assist with strengthening/mobility   - Keep Call bell within reach  - Keep bed low and locked with side rails adjusted as appropriate  - Keep care items and personal belongings within reach  - Initiate and maintain comfort rounds  - Make Fall Risk Sign visible to staff  - Offer Toileting every 2 Hours, in advance of need  - Initiate/Maintain bed alarm  - Obtain necessary fall risk management equipment: n/a  - Apply yellow socks and bracelet for high fall risk patients  - Consider moving patient to room near nurses station  Outcome: Progressing     Problem: SAFETY ADULT  Goal: Maintain or return to baseline ADL function  Description: INTERVENTIONS:  -  Assess patient's ability to carry out ADLs; assess patient's baseline for ADL function and identify physical deficits which impact ability to perform ADLs (bathing, care of mouth/teeth, toileting, grooming, dressing, etc.)  - Assess/evaluate cause of self-care deficits   - Assess range of motion  - Assess  patient's mobility; develop plan if impaired  - Assess patient's need for assistive devices and provide as appropriate  - Encourage maximum independence but intervene and supervise when necessary  - Involve family in performance of ADLs  - Assess for home care needs following discharge   - Consider OT consult to assist with ADL evaluation and planning for discharge  - Provide patient education as appropriate  Outcome: Progressing     Problem: SAFETY ADULT  Goal: Maintains/Returns to pre admission functional level  Description: INTERVENTIONS:  - Perform AM-PAC 6 Click Basic Mobility/ Daily Activity assessment daily.  - Set and communicate daily mobility goal to care team and patient/family/caregiver.   - Collaborate with rehabilitation services on mobility goals if consulted  - Perform Range of Motion 3 times a day.  - Reposition patient every 2 hours.  - Dangle patient 3 times a day  - Stand patient 3 times a day  - Ambulate patient 3 times a day  - Out of bed to chair 3 times a day   - Out of bed for meals 3 times a day  - Out of bed for toileting  - Record patient progress and toleration of activity level   Outcome: Progressing     Problem: DISCHARGE PLANNING  Goal: Discharge to home or other facility with appropriate resources  Description: INTERVENTIONS:  - Identify barriers to discharge w/patient and caregiver  - Arrange for needed discharge resources and transportation as appropriate  - Identify discharge learning needs (meds, wound care, etc.)  - Arrange for interpretive services to assist at discharge as needed  - Refer to Case Management Department for coordinating discharge planning if the patient needs post-hospital services based on physician/advanced practitioner order or complex needs related to functional status, cognitive ability, or social support system  Outcome: Progressing     Problem: Knowledge Deficit  Goal: Patient/family/caregiver demonstrates understanding of disease process, treatment plan,  medications, and discharge instructions  Description: Complete learning assessment and assess knowledge base.  Interventions:  - Provide teaching at level of understanding  - Provide teaching via preferred learning methods  Outcome: Progressing     Problem: RESPIRATORY - ADULT  Goal: Achieves optimal ventilation and oxygenation  Description: INTERVENTIONS:  - Assess for changes in respiratory status  - Assess for changes in mentation and behavior  - Position to facilitate oxygenation and minimize respiratory effort  - Oxygen administered by appropriate delivery if ordered  - Initiate smoking cessation education as indicated  - Encourage broncho-pulmonary hygiene including cough, deep breathe, Incentive Spirometry  - Assess the need for suctioning and aspirate as needed  - Assess and instruct to report SOB or any respiratory difficulty  - Respiratory Therapy support as indicated  Outcome: Progressing     Problem: NEUROSENSORY - ADULT  Goal: Achieves stable or improved neurological status  Description: INTERVENTIONS  - Monitor and report changes in neurological status  - Monitor vital signs such as temperature, blood pressure, glucose, and any other labs ordered   - Initiate measures to prevent increased intracranial pressure  - Monitor for seizure activity and implement precautions if appropriate      Outcome: Progressing     Problem: NEUROSENSORY - ADULT  Goal: Achieves maximal functionality and self care  Description: INTERVENTIONS  - Monitor swallowing and airway patency with patient fatigue and changes in neurological status  - Encourage and assist patient to increase activity and self care.   - Encourage visually impaired, hearing impaired and aphasic patients to use assistive/communication devices  Outcome: Progressing     Problem: CARDIOVASCULAR - ADULT  Goal: Maintains optimal cardiac output and hemodynamic stability  Description: INTERVENTIONS:  - Monitor I/O, vital signs and rhythm  - Monitor for S/S and  trends of decreased cardiac output  - Administer and titrate ordered vasoactive medications to optimize hemodynamic stability  - Assess quality of pulses, skin color and temperature  - Assess for signs of decreased coronary artery perfusion  - Instruct patient to report change in severity of symptoms  Outcome: Progressing     Problem: CARDIOVASCULAR - ADULT  Goal: Absence of cardiac dysrhythmias or at baseline rhythm  Description: INTERVENTIONS:  - Continuous cardiac monitoring, vital signs, obtain 12 lead EKG if ordered  - Administer antiarrhythmic and heart rate control medications as ordered  - Monitor electrolytes and administer replacement therapy as ordered  Outcome: Progressing

## 2024-02-20 NOTE — PHYSICAL THERAPY NOTE
PT TREATMENT     02/20/24 3025   PT Last Visit   PT Visit Date 02/20/24   Note Type   Note Type Treatment   Pain Assessment   Pain Assessment Tool 0-10   Pain Score No Pain   Restrictions/Precautions   Other Precautions Chair Alarm;Bed Alarm;O2;Fall Risk  (SpO2 decreasing)   General   Chart Reviewed Yes   Family/Caregiver Present No   Cognition   Arousal/Participation Cooperative   Subjective   Subjective patient with SOB   Bed Mobility   Additional Comments patient sitting OOB in chair upon arrival   Transfers   Additional Comments Unable to assess standing and gait activity this session due to decreasing SpO2 with limited exercise and activity sitting in bedside chair.  Patient requiring rebreather mask to maintain SpO2 above 88% at times and on 6 L of O2 at rest   Balance   Static Sitting Fair   Dynamic Sitting Fair -   Exercises   Hamstring Stretch Sitting;5 reps;PROM;Bilateral   Quad Sets Sitting;5 reps;Bilateral   Heelslides Sitting;10 reps;Bilateral   Glute Sets Sitting;5 reps;Bilateral   Hip Flexion Sitting;10 reps;Bilateral  (alternating)   Hip Abduction Sitting;10 reps;Bilateral  (alternating)   Knee AROM Long Arc Quad Sitting;10 reps;Bilateral  (alternating)   Ankle Pumps Sitting;10 reps;Bilateral   Heel Cord Stretch Sitting;5 reps;PROM;Bilateral   Assessment   Assessment Patient cooperative and requiring increased time and rest breaks due to decreasing SpO2 with limited activity. Patient will benefit from continued PT with progression as tolerated and increasing functional mobility with clinical staff as well. The patient's AM-PAC Basic Mobility Inpatient Short Form Raw Score is 10. A Raw score of less than or equal to 16 suggests the patient may benefit from discharge to post-acute rehabilitation services. Please also refer to the recommendation of the Physical Therapist for safe discharge planning.         Plan   Treatment/Interventions ADL retraining;Functional transfer training;LE  strengthening/ROM;Therapeutic exercise;Endurance training;Cognitive reorientation;Patient/family training;Equipment eval/education;Bed mobility;Gait training;Compensatory technique education   PT Frequency Other (Comment)  (5week)   Discharge Recommendation   Rehab Resource Intensity Level, PT II (Moderate Resource Intensity)   AM-PAC Basic Mobility Inpatient   Turning in Flat Bed Without Bedrails 2   Lying on Back to Sitting on Edge of Flat Bed Without Bedrails 2   Moving Bed to Chair 2   Standing Up From Chair Using Arms 2   Walk in Room 1   Climb 3-5 Stairs With Railing 1   Basic Mobility Inpatient Raw Score 10   Highest Level Of Mobility   JH-HLM Goal 4: Move to chair/commode   JH-HLM Achieved 4: Move to chair/commode   Education   Patient Reinforcement needed   Licensure   [place] License Number  Giselle Sainz PT [id number]

## 2024-02-20 NOTE — CASE MANAGEMENT
Case Management Progress Note    Patient name Neeraj Palacios  Location [address] [address]/4 [address] 408-* MRN [id number]  : 1950 Date 2024       LOS (days): 6  Geometric Mean LOS (GMLOS) (days): 4.1  Days to GMLOS:-2.1        OBJECTIVE:     Current admission status: Inpatient  Preferred Pharmacy:   [facility] - [address] [address]  Phone: [phone] Fax: [phone]    Primary Care Provider: Riky Herrmann PA-C    Primary Insurance: MEDICARE  Secondary Insurance:     PROGRESS NOTE:    Email received from patient's sister Akila regarding accepting rehab list. Akila requesting referral be sent to [facility]. SW requesting updated notes from PT/OT for Calli to be able to complete review.

## 2024-02-20 NOTE — SPEECH THERAPY NOTE
Speech/Language Pathology Progress Note    Patient Name: Neeraj Palacios  Today's Date: 2/20/2024     Problem List  Principal Problem:    Acute respiratory failure with hypoxia (HCC)  Active Problems:    Hypertension    Swelling of lower leg    Overactive bladder    Aortic ectasia (HCC)    Stroke-like episode    Hypothyroidism    Subjective:  Patient fully alert and upright on arrival.   Records reviewed to begin.  Results of MRI of the brain of 2/17 noted   -No acute ischemia as clinically questioned.  -Moderate periventricular white matter change on T2 and FLAIR imaging consistent with chronic microangiopathic change.  -Marked hydrocephalus involving the lateral ventricles and third ventricle similar to CT scan performed yesterday. 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    Chest x-ray results of 2/18 also noted: Moderate pulmonary fibrosis.  Superimposed edema not excluded.    Objective:  Patient was seen for follow-up to swallowing evaluation completed 2/18 to ensure tolerance of least restrictive diet.   Pt was assessed with applesauce alone and with multiple pills, Loran Doone cookies and thin liquid. Mastication was cautious and effective.  Bolus formation and transfer were effective.  Swallow initiation appeared prompt. Laryngeal rise was good by palpation. No coughing, throat clearing, c/o stasis, multiple swallows, change in vocal quality noted c po intake today.  Brief desaturation from 89 to 86% completed at the end of assessment (after repositioning and activity of chewing and swallowing multiple materials).  O2 rebounded to baseline open 89% within 1 to 2 minutes.    Assessment:  Patient presented with no symptoms of oral or pharyngeal dysphagia with solid food, thin liquid or pills in applesauce today.    Plan/Recommendations:  Consider upgrade to regular textured food and continue thin liquid.  No additional SLP recommendations  or follow-up appears indicated at this time.  Please reconsult if additional questions or concerns arise.    Malena Barlow MS CCC-SLP  [place] License [id number]

## 2024-02-20 NOTE — ASSESSMENT & PLAN NOTE
History of hypertension hypothyroidism OAB presents from [facility] for cough hypoxia shortness of breath found to have multifocal pneumonia and probable ILD  Pulmonary following.  Multifocal pneumonia: Completed Rocephin  Likely due to ILD flare.  CRP elevated but trending down.  Continue 40 mg of IV Solu-Medrol every 12 hours.  Oxygen requirement fluctuates and per staff, does desaturate significantly with minimal exertion  TRISTAN positive but reflex titer pending. ANCA, RF, CCP, aldolase, HP panel, myositis panel pending    Results from last 7 days   Lab Units 02/19/24  0439 02/18/24  0438 02/17/24  0457 02/14/24  0515   PROCALCITONIN ng/ml <0.05 0.05 0.05 0.05       Results from last 7 days   Lab Units 02/19/24  0439 02/17/24  0457   CRP mg/L 12.1* 75.2*

## 2024-02-20 NOTE — PROGRESS NOTES
"Progress Note - Pulmonary   Neeraj Palacios 73 y.o. male MRN: [id number]  Unit/Bed#: [address] [place] [address] Encounter: [id number]    Assessment/Plan:    Acute hypoxic respiratory failure, suspect due to community-acquired pneumonia              Titrate oxygen to maintain POX > or = 89%.               Activity as tolerated.  Would benefit from being out of bed to the chair.              Will need formal oxygen qualification prior to discharge.     Abnormal CT chest with groundglass opacities concerning for community-acquired pneumonia versus inflammatory etiology versus volume overload              Complete course of ceftriaxone.   Continuing on steroids as below.   Diuretics as tolerated per primary team.   Swallow evaluation noted.              Repeat imaging in 6 to 8 weeks.     Emphysema/suspected COPD              Continue DuoNebs 3 times daily while inpatient.              Continue Solu-Medrol 40 mg IV every 12 hours today.     Probable ILD              Follow-up autoimmune serologies as ordered.   Add HP panel and myositis panel.              Outpatient pulmonary follow-up as below.     Nicotine dependence in remission              Quit in 2000.              Continued complete cessation.     Outpatient follow-up pending course.  Discussed with primary team and nursing.    Chief Complaint:    \"I feel better.\"    Subjective:    Neeraj is sitting up in bed this morning finishing a breathing treatment.  He reports he feels better today.  His oxygen requirements have increased however.  He denies any new symptoms.  Nursing states that he desaturates significantly with minimal activity.    Objective:    Vitals: Blood pressure 126/70, pulse 71, temperature 97.7 °F (36.5 °C), temperature source Oral, resp. rate 18, height 6' 3\" (1.905 m), weight 98.7 kg (217 lb 9.6 oz), SpO2 90%.8L NC,Body mass index is 27.2 kg/m².      Intake/Output Summary (Last 24 hours) at 2/20/2024 5532  Last data filed at 2/19/2024 2301  Gross per 24 " hour   Intake 260 ml   Output 1300 ml   Net -1040 ml       Invasive Devices       Peripheral Intravenous Line  Duration             Peripheral IV 02/17/24 Right;Ventral (anterior) Wrist 2 days              Drain  Duration             External Urinary Catheter Small <1 day                    Physical Exam:     Physical Exam  Vitals reviewed.   Constitutional:       General: He is not in acute distress.     Appearance: He is well-developed. He is not toxic-appearing or diaphoretic.      Interventions: Nasal cannula in place.   HENT:      Head: Normocephalic and atraumatic.   Eyes:      General: No scleral icterus.  Neck:      Trachea: No tracheal deviation.   Cardiovascular:      Rate and Rhythm: Normal rate and regular rhythm.      Heart sounds: S1 normal and S2 normal. No murmur heard.     No friction rub. No gallop.   Pulmonary:      Effort: Pulmonary effort is normal. No tachypnea, accessory muscle usage or respiratory distress.      Breath sounds: Normal breath sounds. No stridor. No decreased breath sounds, wheezing, rhonchi or rales.   Chest:      Chest wall: No tenderness.   Abdominal:      General: Bowel sounds are normal. There is no distension.      Palpations: Abdomen is soft.      Tenderness: There is no abdominal tenderness.   Musculoskeletal:         General: No tenderness.      Cervical back: Neck supple.      Right lower leg: No edema.      Left lower leg: No edema.   Skin:     General: Skin is warm and dry.      Findings: No rash.   Neurological:      Mental Status: He is alert and oriented to person, place, and time.      GCS: GCS eye subscore is 4. GCS verbal subscore is 5. GCS motor subscore is 6.   Psychiatric:         Speech: Speech normal.         Behavior: Behavior normal. Behavior is cooperative.         Labs: CMP:   Lab Results   Component Value Date    SODIUM 138 02/20/2024    K 4.7 02/20/2024     02/20/2024    CO2 21 02/20/2024    BUN 25 02/20/2024    CREATININE 0.91 02/20/2024     CALCIUM 9.7 02/20/2024    EGFR 82 02/20/2024     Imaging and other studies:  None today

## 2024-02-21 ENCOUNTER — APPOINTMENT (INPATIENT)
Dept: RADIOLOGY | Facility: HOSPITAL | Age: 74
DRG: 196 | End: 2024-02-21
Payer: MEDICARE

## 2024-02-21 LAB
ALDOLASE SERPL-CCNC: 7.5 U/L (ref 3.3–10.3)
ANION GAP SERPL CALCULATED.3IONS-SCNC: 7 MMOL/L
BUN SERPL-MCNC: 23 MG/DL (ref 5–25)
CALCIUM SERPL-MCNC: 9.5 MG/DL (ref 8.4–10.2)
CHLORIDE SERPL-SCNC: 105 MMOL/L (ref 96–108)
CO2 SERPL-SCNC: 25 MMOL/L (ref 21–32)
CREAT SERPL-MCNC: 0.76 MG/DL (ref 0.6–1.3)
CRP SERPL QL: 5 MG/L
GFR SERPL CREATININE-BSD FRML MDRD: 89 ML/MIN/1.73SQ M
GLUCOSE SERPL-MCNC: 105 MG/DL (ref 65–140)
POTASSIUM SERPL-SCNC: 4.9 MMOL/L (ref 3.5–5.3)
SODIUM SERPL-SCNC: 137 MMOL/L (ref 135–147)

## 2024-02-21 PROCEDURE — 86331 IMMUNODIFFUSION OUCHTERLONY: CPT | Performed by: NURSE PRACTITIONER

## 2024-02-21 PROCEDURE — 99232 SBSQ HOSP IP/OBS MODERATE 35: CPT | Performed by: FAMILY MEDICINE

## 2024-02-21 PROCEDURE — 71045 X-RAY EXAM CHEST 1 VIEW: CPT

## 2024-02-21 PROCEDURE — 84182 PROTEIN WESTERN BLOT TEST: CPT | Performed by: STUDENT IN AN ORGANIZED HEALTH CARE EDUCATION/TRAINING PROGRAM

## 2024-02-21 PROCEDURE — 94640 AIRWAY INHALATION TREATMENT: CPT

## 2024-02-21 PROCEDURE — 86602 ANTINOMYCES ANTIBODY: CPT | Performed by: NURSE PRACTITIONER

## 2024-02-21 PROCEDURE — 94760 N-INVAS EAR/PLS OXIMETRY 1: CPT

## 2024-02-21 PROCEDURE — 97530 THERAPEUTIC ACTIVITIES: CPT

## 2024-02-21 PROCEDURE — 86606 ASPERGILLUS ANTIBODY: CPT | Performed by: NURSE PRACTITIONER

## 2024-02-21 PROCEDURE — 80048 BASIC METABOLIC PNL TOTAL CA: CPT | Performed by: FAMILY MEDICINE

## 2024-02-21 PROCEDURE — 99232 SBSQ HOSP IP/OBS MODERATE 35: CPT | Performed by: STUDENT IN AN ORGANIZED HEALTH CARE EDUCATION/TRAINING PROGRAM

## 2024-02-21 PROCEDURE — 86671 FUNGUS NES ANTIBODY: CPT | Performed by: NURSE PRACTITIONER

## 2024-02-21 PROCEDURE — 86140 C-REACTIVE PROTEIN: CPT | Performed by: NURSE PRACTITIONER

## 2024-02-21 RX ORDER — FUROSEMIDE 10 MG/ML
20 INJECTION INTRAMUSCULAR; INTRAVENOUS ONCE
Status: COMPLETED | OUTPATIENT
Start: 2024-02-21 | End: 2024-02-21

## 2024-02-21 RX ADMIN — FUROSEMIDE 20 MG: 20 TABLET ORAL at 08:51

## 2024-02-21 RX ADMIN — DOCUSATE SODIUM 100 MG: 100 CAPSULE, LIQUID FILLED ORAL at 08:51

## 2024-02-21 RX ADMIN — HEPARIN SODIUM 5000 UNITS: 5000 INJECTION INTRAVENOUS; SUBCUTANEOUS at 06:10

## 2024-02-21 RX ADMIN — FAMOTIDINE 20 MG: 20 TABLET, FILM COATED ORAL at 08:51

## 2024-02-21 RX ADMIN — DOCUSATE SODIUM 100 MG: 100 CAPSULE, LIQUID FILLED ORAL at 17:35

## 2024-02-21 RX ADMIN — METHYLPREDNISOLONE SODIUM SUCCINATE 40 MG: 40 INJECTION, POWDER, FOR SOLUTION INTRAMUSCULAR; INTRAVENOUS at 20:45

## 2024-02-21 RX ADMIN — OXYBUTYNIN CHLORIDE 10 MG: 5 TABLET, EXTENDED RELEASE ORAL at 08:51

## 2024-02-21 RX ADMIN — NIFEDIPINE 30 MG: 30 TABLET, EXTENDED RELEASE ORAL at 08:51

## 2024-02-21 RX ADMIN — SENNOSIDES 8.6 MG: 8.6 TABLET, FILM COATED ORAL at 08:51

## 2024-02-21 RX ADMIN — BENZONATATE 200 MG: 100 CAPSULE ORAL at 16:56

## 2024-02-21 RX ADMIN — HEPARIN SODIUM 5000 UNITS: 5000 INJECTION INTRAVENOUS; SUBCUTANEOUS at 13:53

## 2024-02-21 RX ADMIN — IPRATROPIUM BROMIDE AND ALBUTEROL SULFATE 3 ML: 2.5; .5 SOLUTION RESPIRATORY (INHALATION) at 19:23

## 2024-02-21 RX ADMIN — GUAIFENESIN 1200 MG: 600 TABLET, EXTENDED RELEASE ORAL at 08:51

## 2024-02-21 RX ADMIN — BENZONATATE 200 MG: 100 CAPSULE ORAL at 08:51

## 2024-02-21 RX ADMIN — HEPARIN SODIUM 5000 UNITS: 5000 INJECTION INTRAVENOUS; SUBCUTANEOUS at 21:36

## 2024-02-21 RX ADMIN — IPRATROPIUM BROMIDE AND ALBUTEROL SULFATE 3 ML: 2.5; .5 SOLUTION RESPIRATORY (INHALATION) at 13:01

## 2024-02-21 RX ADMIN — FUROSEMIDE 20 MG: 10 INJECTION, SOLUTION INTRAMUSCULAR; INTRAVENOUS at 20:42

## 2024-02-21 RX ADMIN — BENZONATATE 200 MG: 100 CAPSULE ORAL at 20:48

## 2024-02-21 RX ADMIN — METHYLPREDNISOLONE SODIUM SUCCINATE 40 MG: 40 INJECTION, POWDER, FOR SOLUTION INTRAMUSCULAR; INTRAVENOUS at 08:50

## 2024-02-21 RX ADMIN — GUAIFENESIN AND DEXTROMETHORPHAN 10 ML: 100; 10 SYRUP ORAL at 08:51

## 2024-02-21 RX ADMIN — ASPIRIN 81 MG 81 MG: 81 TABLET ORAL at 08:51

## 2024-02-21 RX ADMIN — IPRATROPIUM BROMIDE AND ALBUTEROL SULFATE 3 ML: 2.5; .5 SOLUTION RESPIRATORY (INHALATION) at 07:00

## 2024-02-21 RX ADMIN — ATORVASTATIN CALCIUM 40 MG: 40 TABLET, FILM COATED ORAL at 17:35

## 2024-02-21 RX ADMIN — GUAIFENESIN 1200 MG: 600 TABLET, EXTENDED RELEASE ORAL at 20:48

## 2024-02-21 RX ADMIN — LEVOTHYROXINE SODIUM 88 MCG: 88 TABLET ORAL at 06:10

## 2024-02-21 NOTE — OCCUPATIONAL THERAPY NOTE
Occupational Therapy Progress Note     Patient Name: Neeraj Palacios  Today's Date: 2/21/2024  Problem List  Principal Problem:    Acute respiratory failure with hypoxia (HCC)  Active Problems:    Hypertension    Swelling of lower leg    Overactive bladder    Aortic ectasia (HCC)    Stroke-like episode    Hypothyroidism       02/21/24 1025   OT Last Visit   OT Visit Date 02/21/24  (Wednesday)   Note Type   Note Type Treatment  (tx session [id number])   Pain Assessment   Pain Assessment Tool 0-10   Pain Score No Pain   Restrictions/Precautions   Weight Bearing Precautions Per Order No   Other Precautions Chair Alarm;Bed Alarm;Multiple lines;O2;Fall Risk  (on 5L O2 upon arrival)   Lifestyle   Autonomy Pt resides in [place]; needs assistance w/ IADLs   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 Enjoys watching tv (interseting movies, comedies, [employer])   ADL   Where Assessed Edge of bed  (vs OOB in chair)   Eating Assistance 6  Modified independent   Eating Deficit Setup;Increased time to complete   Grooming Assistance Unable to assess   Grooming Comments decreased activity tolerance; O2 sats dropped to 70's following functional transfer from EOB to bedside recliner chair. Required 15L on non-rebreather to recover (spoke w/ RN, June)   Bed Mobility   Supine to Sit 3  Moderate assistance   Additional items Assist x 1;Increased time required;Bedrails;HOB elevated;Verbal cues  (to pt's R)   Sit to Supine Unable to assess   Additional Comments Pt seated OOB in chair at end of session w/ needs met, call bell in reach and chair alarm activated   Transfers   Sit to Stand 3  Moderate assistance   Additional items Assist x 2;Increased time required;Verbal cues;Bedrails;Armrests  (from elevated bed height)   Stand to Sit 2  Maximal assistance   Additional items Assist x 2;Increased time required;Verbal cues;Bedrails;Armrests  "  Additional Comments Pt required max A x2 using RW to complete steppage transfer from EOB to bedside recliner chair w/ knees extended.   Functional Mobility   Additional items Rolling walker   Subjective   Subjective \"Do I look like a ignacio who enjoys [employer]\"   Cognition   Overall Cognitive Status Impaired   Arousal/Participation Alert;Cooperative   Attention Attends with cues to redirect   Orientation Level Oriented to person;Oriented to place   Memory Decreased recall of recent events  (details, timeline recent events. Will continue to assess)   Following Commands Follows multistep commands with increased time or repetition   Comments Identified pt by full name and birthdate. Alert and agreeable to participate. Therapist educated pt on breathing / pacing / positiong. Pt required cues to consistently demo understanding   Activity Tolerance   Activity Tolerance Patient limited by fatigue  (decreased O2 sats)   Medical Staff Made Aware care coordination w/ Lorie ALEX due to decreased activity tolerance, regression from baseline and skilled physical assistance required. Spoke w/ Ashley PATEL   Assessment   Assessment Pt seen for skilled OT tx session day 1 focusing on activity engagement, challenging activity tolerance and ongoing eval to maximize functional independence to return to PLOF. Pt agreeable to participate. Pt required less physical assistance to complete bed mobility. Pt required assist of 2 to stand from elevated bed height and max A x2 using RW to complete transfer to chair. O2 sats dropeed to 70's on 5L O2 following functional transfer. Required rest break w/ 15L on non-rebreather to recover to 90's. Coordinated care w/ PT and RNAshley. Therapist educated pt on breathing / pacing and positioning. Pt would benefit from conitnued OT in acute care 3-5X week. Continue to recommend level II rehab resource intensity when medically stable for discharge from acute care. Will continue to follow 3-5X week   Plan "   Treatment Interventions ADL retraining;Functional transfer training;UE strengthening/ROM;Endurance training;Patient/family training;Equipment evaluation/education;Compensatory technique education;Continued evaluation;Activityengagement;Energy conservation   Goal Expiration Date 02/29/24   OT Treatment Day 1  (Wed 2/21/24)   OT Frequency 3-5x/wk   Discharge Recommendation   Rehab Resource Intensity Level, OT II (Moderate Resource Intensity)   AM-PAC Daily Activity Inpatient   Lower Body Dressing 2   Bathing 2   Toileting 2   Upper Body Dressing 2   Grooming 2   Eating 4   Daily Activity Raw Score 14   Daily Activity Standardized Score (Calc for Raw Score >=11) 33.39   -PAC Applied Cognition Inpatient   Following a Speech/Presentation 3   Understanding Ordinary Conversation 3   Taking Medications 2   Remembering Where Things Are Placed or Put Away 3   Remembering List of 4-5 Errands 2   Taking Care of Complicated Tasks 2   Applied Cognition Raw Score 15   Applied Cognition Standardized Score 33.54   Barthel Index   Feeding 5   Bathing 0   Grooming Score 0   Dressing Score 5   Bladder Score 0   Bowels Score 5   Toilet Use Score 0   Transfers (Bed/Chair) Score 5   Mobility (Level Surface) Score 0   Stairs Score 0   Barthel Index Score 20   End of Consult   Education Provided Yes   Patient Position at End of Consult Bedside chair;Bed/Chair alarm activated;All needs within reach   Nurse Communication Nurse aware of consult   Licensure   [place] License Number  Ciera Villeda, OTR/L [id number]        The patient's raw score on the AM-PAC Daily Activity Inpatient Short Form is 14. A raw score of less than 19 suggests the patient may benefit from discharge to post-acute rehabilitation services. Please refer to the recommendation of the Occupational Therapist for safe discharge planning.    Ciera Villeda, OTR/L  [id number]  [id number]

## 2024-02-21 NOTE — ASSESSMENT & PLAN NOTE
History of hypertension hypothyroidism OAB presents from [facility] for cough hypoxia shortness of breath found to have multifocal pneumonia and probable ILD  Pulmonary following  Multifocal pneumonia: Completed Rocephin  Likely due to ILD flare.  CRP elevated but trending down.  Continue 40 mg of IV Solu-Medrol every 12 hours.  Oxygen requirement fluctuates and per staff, does desaturate significantly with minimal exertion  Currently on 5 L  TRISTAN positive but reflex titer pending. ANCA, RF, CCP, aldolase, HP panel, myositis panel pending  Plan for bronchoscopy tomorrow per pulmonary    Results from last 7 days   Lab Units 02/19/24  0439 02/18/24  0438 02/17/24  0457   PROCALCITONIN ng/ml <0.05 0.05 0.05     Results from last 7 days   Lab Units 02/21/24  0534 02/19/24  0439 02/17/24  0457   CRP mg/L 5.0* 12.1* 75.2*

## 2024-02-21 NOTE — ASSESSMENT & PLAN NOTE
Rapid response/stroke alert was called when patient had strokelike episode reportedly with left facial droop and dysarthria  MRI of brain: No acute CVA.  Marked hydrocephalus  Seen by neurolo per neurology outpatient neurosurgery follow-up at [facility]  Neurology recommends continuation of aspirin and atorvastatin

## 2024-02-21 NOTE — PLAN OF CARE
Problem: Prexisting or High Potential for Compromised Skin Integrity  Goal: Skin integrity is maintained or improved  Description: INTERVENTIONS:  - Identify patients at risk for skin breakdown  - Assess and monitor skin integrity  - Assess and monitor nutrition and hydration status  - Monitor labs   - Assess for incontinence   - Turn and reposition patient  - Assist with mobility/ambulation  - Relieve pressure over bony prominences  - Avoid friction and shearing  - Provide appropriate hygiene as needed including keeping skin clean and dry  - Evaluate need for skin moisturizer/barrier cream  - Collaborate with interdisciplinary team   - Patient/family teaching  - Consider wound care consult   Outcome: Progressing     Problem: SAFETY ADULT  Goal: Patient will remain free of falls  Description: INTERVENTIONS:  - Educate patient/family on patient safety including physical limitations  - Instruct patient to call for assistance with activity   - Consult OT/PT to assist with strengthening/mobility   - Keep Call bell within reach  - Keep bed low and locked with side rails adjusted as appropriate  - Keep care items and personal belongings within reach  - Initiate and maintain comfort rounds  - Make Fall Risk Sign visible to staff  - Offer Toileting every 2 Hours, in advance of need  - Initiate/Maintain bed alarm  - Obtain necessary fall risk management equipment: n/a  - Apply yellow socks and bracelet for high fall risk patients  - Consider moving patient to room near nurses station  Outcome: Progressing     Problem: Knowledge Deficit  Goal: Patient/family/caregiver demonstrates understanding of disease process, treatment plan, medications, and discharge instructions  Description: Complete learning assessment and assess knowledge base.  Interventions:  - Provide teaching at level of understanding  - Provide teaching via preferred learning methods  Outcome: Progressing     Problem: RESPIRATORY - ADULT  Goal: Achieves  optimal ventilation and oxygenation  Description: INTERVENTIONS:  - Assess for changes in respiratory status  - Assess for changes in mentation and behavior  - Position to facilitate oxygenation and minimize respiratory effort  - Oxygen administered by appropriate delivery if ordered  - Initiate smoking cessation education as indicated  - Encourage broncho-pulmonary hygiene including cough, deep breathe, Incentive Spirometry  - Assess the need for suctioning and aspirate as needed  - Assess and instruct to report SOB or any respiratory difficulty  - Respiratory Therapy support as indicated  Outcome: Progressing     Problem: CARDIOVASCULAR - ADULT  Goal: Maintains optimal cardiac output and hemodynamic stability  Description: INTERVENTIONS:  - Monitor I/O, vital signs and rhythm  - Monitor for S/S and trends of decreased cardiac output  - Administer and titrate ordered vasoactive medications to optimize hemodynamic stability  - Assess quality of pulses, skin color and temperature  - Assess for signs of decreased coronary artery perfusion  - Instruct patient to report change in severity of symptoms  Outcome: Progressing     Problem: CARDIOVASCULAR - ADULT  Goal: Absence of cardiac dysrhythmias or at baseline rhythm  Description: INTERVENTIONS:  - Continuous cardiac monitoring, vital signs, obtain 12 lead EKG if ordered  - Administer antiarrhythmic and heart rate control medications as ordered  - Monitor electrolytes and administer replacement therapy as ordered  Outcome: Progressing

## 2024-02-21 NOTE — PHYSICAL THERAPY NOTE
"   PT TREATMENT     02/21/24 1030   PT Last Visit   PT Visit Date 02/21/24   Note Type   Note Type Treatment   Pain Assessment   Pain Assessment Tool 0-10   Pain Score No Pain   General   Chart Reviewed Yes   Cognition   Overall Cognitive Status Impaired   Arousal/Participation Cooperative   Following Commands Follows one step commands without difficulty   Subjective   Subjective \"I will do whatever you tell me to do\"   Bed Mobility   Supine to Sit 2  Maximal assistance   Transfers   Sit to Stand 2  Maximal assistance   Additional items Assist x 1   Stand to Sit 2  Maximal assistance   Additional items Assist x 1   Ambulation/Elevation   Gait pattern   (Pt has difficulty taking steps even with use of walker and max assist x 2, progressive weakness with standing and increased time to take a few steps to the chair, Sp02 drops to 80% with actiivty requiring non rebreather mask (OK'ed by Ashley PATEL))   Gait Assistance 2  Maximal assist   Additional items Assist x 2   Assistive Device Rolling walker   Distance 2 feet   Assessment   Prognosis Good   Problem List Decreased strength;Decreased endurance;Impaired balance;Decreased mobility   Assessment Pt remains generally weak as pt requires max assist x 2 to transfer and Sp02 drops with little activity. Pt needed about 10 minutes to recover.  Pt's Sp20 was 95% on 5L at the end of pt's session.  Additional activity deferred due to fatigue from just transferring OOB.     The patient's AM-PAC Basic Mobility Inpatient Short Form Raw Score is 10. A Raw score of less than or equal to 16 suggests the patient may benefit from discharge to post-acute rehabilitation services. Please also refer to the recommendation of the Physical Therapist for safe discharge planning.         Plan   Treatment/Interventions Functional transfer training;LE strengthening/ROM;Therapeutic exercise;Endurance training;Gait training;Bed mobility;Equipment eval/education;Patient/family training   Progress " Progressing toward goals   PT Frequency Other (Comment)  (5x/week)   Discharge Recommendation   Rehab Resource Intensity Level, PT II (Moderate Resource Intensity)   AM-PAC Basic Mobility Inpatient   Turning in Flat Bed Without Bedrails 2   Lying on Back to Sitting on Edge of Flat Bed Without Bedrails 2   Moving Bed to Chair 1   Standing Up From Chair Using Arms 1   Walk in Room 1   Climb 3-5 Stairs With Railing 1   Basic Mobility Inpatient Raw Score 8   Turning Head Towards Sound 4   Follow Simple Instructions 3   Low Function Basic Mobility Raw Score  15   Low Function Basic Mobility Standardized Score  23.9   Highest Level Of Mobility   -HLM Goal 3: Sit at edge of bed   -HLM Achieved 4: Move to chair/commode   Licensure   [place] License Number  Chhaya Skinner PT [id number]

## 2024-02-21 NOTE — PROGRESS NOTES
[facility]  Progress Note  Name: Neeraj Palacios I  MRN: [id number]  Unit/Bed#: [address] [place] 408-01 I Date of Admission: 2/14/2024   Date of Service: 2/21/2024 I Hospital Day: 7    Assessment/Plan   * Acute respiratory failure with hypoxia (HCC)  Assessment & Plan  History of hypertension hypothyroidism OAB presents from [facility] for cough hypoxia shortness of breath found to have multifocal pneumonia and probable ILD  Pulmonary following  Multifocal pneumonia: Completed Rocephin  Likely due to ILD flare.  CRP elevated but trending down.  Continue 40 mg of IV Solu-Medrol every 12 hours.  Oxygen requirement fluctuates and per staff, does desaturate significantly with minimal exertion  Currently on 5 L  TRISTAN positive but reflex titer pending. ANCA, RF, CCP, aldolase, HP panel, myositis panel pending  Plan for bronchoscopy tomorrow per pulmonary    Results from last 7 days   Lab Units 02/19/24  0439 02/18/24  0438 02/17/24  0457   PROCALCITONIN ng/ml <0.05 0.05 0.05     Results from last 7 days   Lab Units 02/21/24  0534 02/19/24  0439 02/17/24  0457   CRP mg/L 5.0* 12.1* 75.2*       Stroke-like episode  Assessment & Plan  Rapid response/stroke alert was called when patient had strokelike episode reportedly with left facial droop and dysarthria  MRI of brain: No acute CVA.  Marked hydrocephalus  Seen by neurolo per neurology outpatient neurosurgery follow-up at [facility]  Neurology recommends continuation of aspirin and atorvastatin    Swelling of lower leg  Assessment & Plan  Echocardiogram without LV dysfunction.  On p.o. Lasix daily  Chest x-ray from today shows persistent slightly decreased vascular and interstitial edema  Will do a trial of 20 mg of IV Lasix x 1 today and monitor response    Hypothyroidism  Assessment & Plan  Continue levothyroxine.    Aortic ectasia (HCC)  Assessment & Plan  Needs outpatient follow-up.    Overactive bladder  Assessment & Plan  Prior to admission  on myrbetriq.  Currently on oxybutynin.    Hypertension  Assessment & Plan  Stable, continue nifedipine         VTE Pharmacologic Prophylaxis: VTE Score: 3 Moderate Risk (Score 3-4) - Pharmacological DVT Prophylaxis Ordered: heparin.    Mobility:   Basic Mobility Inpatient Raw Score: 8  JH-HLM Goal: 3: Sit at edge of bed  JH-HLM Achieved: 4: Move to chair/commode  HLM Goal achieved. Continue to encourage appropriate mobility.    Patient Centered Rounds: I performed bedside rounds with nursing staff today.   Discussions with Specialists or Other Care Team Provider: yes     Education and Discussions with Family / Patient: Updated  (sister) via phone.    Total Time Spent on Date of Encounter in care of patient: This time was spent on one or more of the following: performing physical exam; counseling and coordination of care; obtaining or reviewing history; documenting in the medical record; reviewing/ordering tests, medications or procedures; communicating with other healthcare professionals and discussing with patient's family/caregivers.    Current Length of Stay: 7 day(s)  Current Patient Status: Inpatient   Certification Statement: The patient will continue to require additional inpatient hospital stay due to acute respiratory failure with hypoxia requiring IV Lasix and bronchoscopy  Discharge Plan: Anticipate discharge in >72 hrs to discharge location to be determined pending rehab evaluations.    Code Status: Level 1 - Full Code    Subjective:     Continues to report slow improvement in his breathing    Objective:     Vitals:   Temp (24hrs), Av.6 °F (36.4 °C), Min:97.3 °F (36.3 °C), Max:97.9 °F (36.6 °C)    Temp:  [97.3 °F (36.3 °C)-97.9 °F (36.6 °C)] 97.9 °F (36.6 °C)  HR:  [57-63] 63  Resp:  [20] 20  BP: (130-132)/(75-78) 130/78  SpO2:  [88 %-96 %] 88 %  Body mass index is 27.42 kg/m².     Input and Output Summary (last 24 hours):     Intake/Output Summary (Last 24 hours) at 2024  1859  Last data filed at 2/21/2024 0534  Gross per 24 hour   Intake --   Output 950 ml   Net -950 ml       Physical Exam:   Physical Exam  Vitals reviewed.   Constitutional:       General: He is not in acute distress.     Appearance: He is ill-appearing.   HENT:      Head: Normocephalic and atraumatic.   Eyes:      General:         Right eye: No discharge.         Left eye: No discharge.   Cardiovascular:      Rate and Rhythm: Normal rate and regular rhythm.   Pulmonary:      Effort: Pulmonary effort is normal. No respiratory distress.      Breath sounds: No wheezing.      Comments: Some crackles noted  Abdominal:      General: Bowel sounds are normal. There is no distension.      Palpations: Abdomen is soft.      Tenderness: There is no abdominal tenderness.   Neurological:      Mental Status: He is alert.          Additional Data:     Labs:  Results from last 7 days   Lab Units 02/19/24  0439 02/17/24  0457 02/16/24  0500   WBC Thousand/uL 10.94*   < > 7.44   HEMOGLOBIN g/dL 15.5   < > 14.8   HEMATOCRIT % 45.6   < > 42.7   PLATELETS Thousands/uL 260   < > 218   NEUTROS PCT %  --   --  61   LYMPHS PCT %  --   --  14   MONOS PCT %  --   --  12   EOS PCT %  --   --  12*    < > = values in this interval not displayed.     Results from last 7 days   Lab Units 02/21/24  0534 02/18/24  0438 02/17/24  0457   SODIUM mmol/L 137   < > 137   POTASSIUM mmol/L 4.9   < > 4.6   CHLORIDE mmol/L 105   < > 106   CO2 mmol/L 25   < > 22   BUN mg/dL 23   < > 18   CREATININE mg/dL 0.76   < > 0.87   ANION GAP mmol/L 7   < > 9   CALCIUM mg/dL 9.5   < > 9.2   ALBUMIN g/dL  --   --  3.6   TOTAL BILIRUBIN mg/dL  --   --  0.56   ALK PHOS U/L  --   --  58   ALT U/L  --   --  8   AST U/L  --   --  18   GLUCOSE RANDOM mg/dL 105   < > 131    < > = values in this interval not displayed.         Results from last 7 days   Lab Units 02/16/24  1803   POC GLUCOSE mg/dl 156*     Results from last 7 days   Lab Units 02/17/24  0457   HEMOGLOBIN A1C % 4.9      Results from last 7 days   Lab Units 02/19/24  0439 02/18/24  0438 02/17/24  0457   PROCALCITONIN ng/ml <0.05 0.05 0.05       Lines/Drains:  Invasive Devices       Peripheral Intravenous Line  Duration             Peripheral IV 02/17/24 Right;Ventral (anterior) Wrist 4 days              Drain  Duration             External Urinary Catheter Small <1 day                          Imaging: Reviewed radiology reports from this admission including: chest xray    Recent Cultures (last 7 days):         Last 24 Hours Medication List:   Current Facility-Administered Medications   Medication Dose Route Frequency Provider Last Rate    acetaminophen  650 mg Oral Q6H PRN Sofia A ROXY Valenzuela      aspirin  81 mg Oral Daily ROXY Muniz      atorvastatin  40 mg Oral QPM ROXY Muniz      benzonatate  200 mg Oral TID Salty Morin DO      dextromethorphan-guaiFENesin  10 mL Oral Q4H PRN Salty Morin DO      docusate sodium  100 mg Oral BID Sofia A ROXY Valenzuela      famotidine  20 mg Oral Daily Sofia ROXY Jorgensen      furosemide  20 mg Intravenous Once Sheri oBbby DO      guaiFENesin  1,200 mg Oral Q12H [facility] ROXY Abbott      heparin (porcine)  5,000 Units Subcutaneous Q8H [facility] ROXY Wright      ipratropium-albuterol  3 mL Nebulization TID Ximena Kennedy MD      levothyroxine  88 mcg Oral Early Morning ROXY Wright      methylPREDNISolone sodium succinate  40 mg Intravenous Q12H [facility] Ximena Kennedy MD      NIFEdipine  30 mg Oral Daily SofiaROXY Rowan      oxybutynin  10 mg Oral Daily Sofia ROXY Jorgensen      senna  1 tablet Oral Daily ROXY Wright          Today, Patient Was Seen By: Sheri Bobby DO    **Please Note: This note may have been constructed using a voice recognition system.**

## 2024-02-21 NOTE — ASSESSMENT & PLAN NOTE
Echocardiogram without LV dysfunction.  On p.o. Lasix daily  Chest x-ray from today shows persistent slightly decreased vascular and interstitial edema  Will do a trial of 20 mg of IV Lasix x 1 today and monitor response

## 2024-02-21 NOTE — PROGRESS NOTES
"    Consultation - Pulmonary Medicine   Neeraj Palacios 73 y.o. male MRN: [id number]      Reason for Consult: ILD Flare    Neeraj Palacios is a 73 y.o. male with a  PMH of HTN, Prostate cancer who [place] with worsening [place]argy and cough found to have significant hypoxia concerning for ILD Flare.      Plan:  Hypoxic Respiratory Failure - ILD Flare - See no 2/19 for further thoughts - TRISTAN positive but will wait for final results. No other obvious CTD features.  Oxygen requirements continue to improve, CRP improving. Plan for OR bronchoscopy  2/23 1:30PM  - Recommend continuing Solumedrol 40mg q12hr   - NPO at midnight 2/22  - Follow up - TRISTAN, ANCA, Aldolase, HP panel, Myositis Panel      Ashok Maguire MD  [facility]    _____________________________________________________________________    Imaging:  I personally reviewed the images on the PAC system pertinent to today's visit  CT Chest   Diffuse consolidative ground glass, bronchiectasis, with reticulations     CXR 2/18  Mild improvement of bilateral opacities     CXR 2/21  Bilateral infiltrates mild improvement to unchanged       PhysicalExamination:  Vitals:   /75   Pulse 57   Temp (!) 97.3 °F (36.3 °C)   Resp 20   Ht 6' 3\" (1.905 m)   Wt 99.5 kg (219 lb 6.4 oz)   SpO2 96%   BMI 27.42 kg/m²     Appearance -- NAD, speaking full sentences  Neuro -- A&Ox3  Neck -- no JVD  Heart -- RRR, no murmurs  Lungs -- Basilar fine crackles  Abdomen -- soft, NTND  Extremities -- WWP, no LE edema  Skin -- no rash    Review of Systems:  Aside from what is mentioned in the HPI, the review of systems otherwise negative.      There is no immunization history on file for this patient.     Current Medications:    Current Facility-Administered Medications:     acetaminophen (TYLENOL) tablet 650 mg, 650 mg, Oral, Q6H PRN, ROXY Wright    aspirin chewable tablet 81 mg, 81 mg, Oral, Daily, ROXY Muniz, 81 mg at 02/21/24 " 0851    atorvastatin (LIPITOR) tablet 40 mg, 40 mg, Oral, QPM, ROXY Muniz, 40 mg at 02/20/24 1811    benzonatate (TESSALON PERLES) capsule 200 mg, 200 mg, Oral, TID, Salty Morin, DO, 200 mg at 02/21/24 0851    dextromethorphan-guaiFENesin (ROBITUSSIN DM) oral syrup 10 mL, 10 mL, Oral, Q4H PRN, Salty Morin, DO, 10 mL at 02/21/24 0851    docusate sodium (COLACE) capsule 100 mg, 100 mg, Oral, BID, ROXY Wright, 100 mg at 02/21/24 0851    famotidine (PEPCID) tablet 20 mg, 20 mg, Oral, Daily, ROXY Wrigth, 20 mg at 02/21/24 0851    furosemide (LASIX) tablet 20 mg, 20 mg, Oral, Daily, ROXY Wright, 20 mg at 02/21/24 0851    guaiFENesin (MUCINEX) 12 hr tablet 1,200 mg, 1,200 mg, Oral, Q12H BOBBI, ROXY Abbott, 1,200 mg at 02/21/24 0851    heparin (porcine) subcutaneous injection 5,000 Units, 5,000 Units, Subcutaneous, Q8H BOBBI, 5,000 Units at 02/21/24 0610 **AND** [CANCELED] Platelet count, , , Once, ROXY Wright    ipratropium-albuterol (DUO-NEB) 0.5-2.5 mg/3 mL inhalation solution 3 mL, 3 mL, Nebulization, TID, Ximena Kennedy MD, 3 mL at 02/21/24 1301    levothyroxine tablet 88 mcg, 88 mcg, Oral, Early Morning, ROXY Wright, 88 mcg at 02/21/24 0610    methylPREDNISolone sodium succinate (Solu-MEDROL) injection 40 mg, 40 mg, Intravenous, Q12H BOBBI, Ximena Kennedy MD, 40 mg at 02/21/24 0850    NIFEdipine (PROCARDIA XL) 24 hr tablet 30 mg, 30 mg, Oral, Daily, Sofia A Ujvary, CRNP, 30 mg at 02/21/24 0851    oxybutynin (DITROPAN-XL) 24 hr tablet 10 mg, 10 mg, Oral, Daily, Sofia A Ujvary, CRNP, 10 mg at 02/21/24 0851    senna (SENOKOT) tablet 8.6 mg, 1 tablet, Oral, Daily, Sofia A Ujvary, CRNP, 8.6 mg at 02/21/24 0851    Historical Information   Past Medical History:   Diagnosis Date    GERD without esophagitis     Hypertension     Hypothyroid     Prostate CA (HCC)      History reviewed. No pertinent surgical history.  Social History   Social  "History     Tobacco Use   Smoking Status Former    Types: Cigarettes   Smokeless Tobacco Never       Family History:   History reviewed. No pertinent family history.              Diagnostic Data:  Labs:  I personally reviewed the most recent laboratory data pertinent to today's visit    Lab Results   Component Value Date    WBC 10.94 (H) 02/19/2024    HGB 15.5 02/19/2024    HCT 45.6 02/19/2024    MCV 90 02/19/2024     02/19/2024     Lab Results   Component Value Date    CALCIUM 9.5 02/21/2024    K 4.9 02/21/2024    CO2 25 02/21/2024     02/21/2024    BUN 23 02/21/2024    CREATININE 0.76 02/21/2024     No results found for: \"IGE\"  Lab Results   Component Value Date    ALT 8 02/17/2024    AST 18 02/17/2024    ALKPHOS 58 02/17/2024             "

## 2024-02-22 LAB
ANION GAP SERPL CALCULATED.3IONS-SCNC: 8 MMOL/L
BUN SERPL-MCNC: 29 MG/DL (ref 5–25)
C-ANCA TITR SER IF: NORMAL TITER
CALCIUM SERPL-MCNC: 9.6 MG/DL (ref 8.4–10.2)
CHLORIDE SERPL-SCNC: 102 MMOL/L (ref 96–108)
CO2 SERPL-SCNC: 25 MMOL/L (ref 21–32)
CREAT SERPL-MCNC: 0.82 MG/DL (ref 0.6–1.3)
DSDNA AB SER QL CLIF: NEGATIVE
GFR SERPL CREATININE-BSD FRML MDRD: 87 ML/MIN/1.73SQ M
GLUCOSE SERPL-MCNC: 108 MG/DL (ref 65–140)
MYELOPEROXIDASE AB SER IA-ACNC: <0.2 UNITS (ref 0–0.9)
P-ANCA ATYPICAL TITR SER IF: NORMAL TITER
P-ANCA TITR SER IF: NORMAL TITER
POTASSIUM SERPL-SCNC: 4.6 MMOL/L (ref 3.5–5.3)
PROTEINASE3 AB SER IA-ACNC: <0.2 UNITS (ref 0–0.9)
SODIUM SERPL-SCNC: 135 MMOL/L (ref 135–147)

## 2024-02-22 PROCEDURE — 94760 N-INVAS EAR/PLS OXIMETRY 1: CPT

## 2024-02-22 PROCEDURE — 80048 BASIC METABOLIC PNL TOTAL CA: CPT | Performed by: FAMILY MEDICINE

## 2024-02-22 PROCEDURE — 99233 SBSQ HOSP IP/OBS HIGH 50: CPT | Performed by: FAMILY MEDICINE

## 2024-02-22 PROCEDURE — 94640 AIRWAY INHALATION TREATMENT: CPT

## 2024-02-22 PROCEDURE — 99232 SBSQ HOSP IP/OBS MODERATE 35: CPT | Performed by: STUDENT IN AN ORGANIZED HEALTH CARE EDUCATION/TRAINING PROGRAM

## 2024-02-22 RX ORDER — FUROSEMIDE 10 MG/ML
40 INJECTION INTRAMUSCULAR; INTRAVENOUS ONCE
Status: COMPLETED | OUTPATIENT
Start: 2024-02-22 | End: 2024-02-22

## 2024-02-22 RX ORDER — HEPARIN SODIUM 5000 [USP'U]/ML
5000 INJECTION, SOLUTION INTRAVENOUS; SUBCUTANEOUS ONCE
Status: COMPLETED | OUTPATIENT
Start: 2024-02-22 | End: 2024-02-22

## 2024-02-22 RX ADMIN — BENZONATATE 200 MG: 100 CAPSULE ORAL at 23:38

## 2024-02-22 RX ADMIN — LEVOTHYROXINE SODIUM 88 MCG: 88 TABLET ORAL at 05:20

## 2024-02-22 RX ADMIN — OXYBUTYNIN CHLORIDE 10 MG: 5 TABLET, EXTENDED RELEASE ORAL at 09:55

## 2024-02-22 RX ADMIN — IPRATROPIUM BROMIDE AND ALBUTEROL SULFATE 3 ML: 2.5; .5 SOLUTION RESPIRATORY (INHALATION) at 19:39

## 2024-02-22 RX ADMIN — ATORVASTATIN CALCIUM 40 MG: 40 TABLET, FILM COATED ORAL at 17:34

## 2024-02-22 RX ADMIN — BENZONATATE 200 MG: 100 CAPSULE ORAL at 16:05

## 2024-02-22 RX ADMIN — FUROSEMIDE 40 MG: 10 INJECTION, SOLUTION INTRAMUSCULAR; INTRAVENOUS at 09:54

## 2024-02-22 RX ADMIN — GUAIFENESIN 1200 MG: 600 TABLET, EXTENDED RELEASE ORAL at 09:55

## 2024-02-22 RX ADMIN — DOCUSATE SODIUM 100 MG: 100 CAPSULE, LIQUID FILLED ORAL at 17:34

## 2024-02-22 RX ADMIN — IPRATROPIUM BROMIDE AND ALBUTEROL SULFATE 3 ML: 2.5; .5 SOLUTION RESPIRATORY (INHALATION) at 07:24

## 2024-02-22 RX ADMIN — HEPARIN SODIUM 5000 UNITS: 5000 INJECTION INTRAVENOUS; SUBCUTANEOUS at 05:20

## 2024-02-22 RX ADMIN — DOCUSATE SODIUM 100 MG: 100 CAPSULE, LIQUID FILLED ORAL at 09:55

## 2024-02-22 RX ADMIN — IPRATROPIUM BROMIDE AND ALBUTEROL SULFATE 3 ML: 2.5; .5 SOLUTION RESPIRATORY (INHALATION) at 13:40

## 2024-02-22 RX ADMIN — HEPARIN SODIUM 5000 UNITS: 5000 INJECTION INTRAVENOUS; SUBCUTANEOUS at 23:37

## 2024-02-22 RX ADMIN — METHYLPREDNISOLONE SODIUM SUCCINATE 40 MG: 40 INJECTION, POWDER, FOR SOLUTION INTRAMUSCULAR; INTRAVENOUS at 09:54

## 2024-02-22 RX ADMIN — HEPARIN SODIUM 5000 UNITS: 5000 INJECTION INTRAVENOUS; SUBCUTANEOUS at 13:38

## 2024-02-22 RX ADMIN — BENZONATATE 200 MG: 100 CAPSULE ORAL at 09:55

## 2024-02-22 RX ADMIN — FAMOTIDINE 20 MG: 20 TABLET, FILM COATED ORAL at 09:55

## 2024-02-22 RX ADMIN — GUAIFENESIN 1200 MG: 600 TABLET, EXTENDED RELEASE ORAL at 23:38

## 2024-02-22 RX ADMIN — ASPIRIN 81 MG 81 MG: 81 TABLET ORAL at 09:55

## 2024-02-22 RX ADMIN — SENNOSIDES 8.6 MG: 8.6 TABLET, FILM COATED ORAL at 09:55

## 2024-02-22 RX ADMIN — NIFEDIPINE 30 MG: 30 TABLET, EXTENDED RELEASE ORAL at 09:55

## 2024-02-22 RX ADMIN — METHYLPREDNISOLONE SODIUM SUCCINATE 40 MG: 40 INJECTION, POWDER, FOR SOLUTION INTRAMUSCULAR; INTRAVENOUS at 23:37

## 2024-02-22 NOTE — PROGRESS NOTES
"Progress Note - Pulmonary   Neeraj Palacios 73 y.o. male MRN: [id number]  Unit/Bed#: [address] [place] [address] Encounter: [id number]    Assessment/Plan:    Acute hypoxic respiratory failure, suspect due to community-acquired pneumonia              Titrate oxygen to maintain POX > or = 89%.               Activity as tolerated.  Would benefit from being out of bed to the chair.              Will need formal oxygen qualification prior to discharge.     Abnormal CT chest with groundglass opacities concerning for community-acquired pneumonia versus inflammatory etiology versus volume overload              Completed course of ceftriaxone.              Continuing on steroids as below.              Diuretics as tolerated per primary team.              Swallow evaluation noted.   N.p.o. after midnight for bronchoscopy tomorrow.              Repeat imaging in 6 to 8 weeks.     Emphysema/suspected COPD              Continue DuoNebs 3 times daily while inpatient.              Continue Solu-Medrol 40 mg IV every 12 hours today.     Probable ILD              Follow-up autoimmune serologies, myositis panel.              Outpatient pulmonary follow-up as below.     Nicotine dependence in remission              Quit in 2000.              Continued complete cessation.     Outpatient follow-up pending course.  Discussed with primary team.    Chief Complaint:    \"I feel better.\"    Subjective:    Neeraj is sitting up in bed.  He reports he feels better.  He is eager for his procedure tomorrow.  No new shortness of breath.  No cough.    Objective:    Vitals: Blood pressure 119/76, pulse 57, temperature 97.6 °F (36.4 °C), resp. rate 18, height 6' 3\" (1.905 m), weight 98.4 kg (217 lb), SpO2 92%.4L NC,Body mass index is 27.12 kg/m².      Intake/Output Summary (Last 24 hours) at 2/22/2024 0917  Last data filed at 2/22/2024 0330  Gross per 24 hour   Intake --   Output 2150 ml   Net -2150 ml       Invasive Devices       Peripheral Intravenous Line  " Duration             Peripheral IV 02/22/24 Right;Ventral (anterior) Forearm <1 day              Drain  Duration             External Urinary Catheter Small <1 day                    Physical Exam:     Physical Exam  Vitals reviewed.   Constitutional:       General: He is not in acute distress.     Appearance: He is well-developed. He is not toxic-appearing or diaphoretic.      Interventions: Nasal cannula in place.   HENT:      Head: Normocephalic and atraumatic.   Eyes:      General: No scleral icterus.  Neck:      Trachea: No tracheal deviation.   Cardiovascular:      Rate and Rhythm: Normal rate and regular rhythm.      Heart sounds: S1 normal and S2 normal. No murmur heard.     No friction rub. No gallop.   Pulmonary:      Effort: Pulmonary effort is normal. No tachypnea, accessory muscle usage or respiratory distress.      Breath sounds: Normal breath sounds. No stridor. No decreased breath sounds, wheezing, rhonchi or rales.   Chest:      Chest wall: No tenderness.   Abdominal:      General: Bowel sounds are normal. There is no distension.      Palpations: Abdomen is soft.      Tenderness: There is no abdominal tenderness.   Musculoskeletal:         General: No tenderness.      Cervical back: Neck supple.      Right lower leg: No edema.      Left lower leg: No edema.   Skin:     General: Skin is warm and dry.      Findings: No rash.   Neurological:      Mental Status: He is alert and oriented to person, place, and time.      GCS: GCS eye subscore is 4. GCS verbal subscore is 5. GCS motor subscore is 6.   Psychiatric:         Speech: Speech normal.         Behavior: Behavior normal. Behavior is cooperative.         Labs: CMP:   Lab Results   Component Value Date    SODIUM 135 02/22/2024    K 4.6 02/22/2024     02/22/2024    CO2 25 02/22/2024    BUN 29 (H) 02/22/2024    CREATININE 0.82 02/22/2024    CALCIUM 9.6 02/22/2024    EGFR 87 02/22/2024     CRP 5.0    Imaging and other studies:  None today

## 2024-02-22 NOTE — ASSESSMENT & PLAN NOTE
Rapid response/stroke alert was called when patient had strokelike episode reportedly with left facial droop and dysarthria  MRI of brain: No acute CVA.  Marked hydrocephalus.  Seen by neurolo per neurology outpatient neurosurgery follow-up at [facility]  Per neurology  continue aspirin and atorvastatin

## 2024-02-22 NOTE — ASSESSMENT & PLAN NOTE
Trended down.  History of hypertension hypothyroidism OAB presents from [facility] for cough hypoxia shortness of breath found to have multifocal pneumonia and probable ILD  Pulmonary following  Multifocal pneumonia: Completed Rocephin  Likely due to ILD flare.  CRP elevated but trending down.    Continue 40 mg of IV Solu-Medrol every 12 hours. O2 requirement fluctuates but currently on 4 L.  per staff, does easily desaturate significantly with minimal exertion  TRISTAN positive but reflex titer pending. ANCA, aldolase, CCP, ds-DNA neg.   HP panel, myositis panel pending  Plan for bronchoscopy tomorrow per pulmonary    Results from last 7 days   Lab Units 02/19/24  0439 02/18/24  0438 02/17/24  0457   PROCALCITONIN ng/ml <0.05 0.05 0.05     Results from last 7 days   Lab Units 02/21/24  0534 02/19/24  0439 02/17/24  0457   CRP mg/L 5.0* 12.1* 75.2*

## 2024-02-22 NOTE — PROGRESS NOTES
[facility]  Progress Note  Name: Neeraj Palacios I  MRN: [id number]  Unit/Bed#: 4 [place] 408-01 I Date of Admission: 2/14/2024   Date of Service: 2/22/2024 I Hospital Day: 8    Assessment/Plan   * Acute respiratory failure with hypoxia (HCC)  Assessment & Plan  Trended down.  History of hypertension hypothyroidism OAB presents from [facility] living for cough hypoxia shortness of breath found to have multifocal pneumonia and probable ILD  Pulmonary following  Multifocal pneumonia: Completed Rocephin  Likely due to ILD flare.  CRP elevated but trending down.    Continue 40 mg of IV Solu-Medrol every 12 hours. O2 requirement fluctuates but currently on 4 L.  per staff, does easily desaturate significantly with minimal exertion  TRISTAN positive but reflex titer pending. ANCA, aldolase, CCP, ds-DNA neg.   HP panel, myositis panel pending  Plan for bronchoscopy tomorrow per pulmonary    Results from last 7 days   Lab Units 02/19/24  0439 02/18/24  0438 02/17/24  0457   PROCALCITONIN ng/ml <0.05 0.05 0.05     Results from last 7 days   Lab Units 02/21/24  0534 02/19/24  0439 02/17/24  0457   CRP mg/L 5.0* 12.1* 75.2*       Stroke-like episode  Assessment & Plan  Rapid response/stroke alert was called when patient had strokelike episode reportedly with left facial droop and dysarthria  MRI of brain: No acute CVA.  Marked hydrocephalus.  Seen by neurolo per neurology outpatient [facility]  Per neurology  continue aspirin and atorvastatin    Swelling of lower leg  Assessment & Plan  Echocardiogram without LV dysfunction.  On p.o. Lasix daily at home  Chest x-ray 2/21 shows persistent slightly decreased vascular and interstitial edema  trial of 20 mg of IV Lasix x 1 on 2/21 with good response. 40mg IV x 1 today and monitor response    Hypothyroidism  Assessment & Plan  Continue levothyroxine    Aortic ectasia (HCC)  Assessment & Plan  Needs outpatient follow-up    Overactive  bladder  Assessment & Plan  Prior to admission on myrbetriq.  Currently on oxybutynin    Hypertension  Assessment & Plan  Continue nifedipine               VTE Pharmacologic Prophylaxis: VTE Score: 3 Moderate Risk (Score 3-4) - Pharmacological DVT Prophylaxis Ordered: heparin.    Mobility:   Basic Mobility Inpatient Raw Score: 8  JH-HLM Goal: 3: Sit at edge of bed  JH-HLM Achieved: 4: Move to chair/commode  HLM Goal achieved. Continue to encourage appropriate mobility.    Patient Centered Rounds: I performed bedside rounds with nursing staff today.   Discussions with Specialists or Other Care Team Provider: yes - pulm    Education and Discussions with Family / Patient: Updated  (sister) via phone.    Total Time Spent on Date of Encounter in care of patient: This time was spent on one or more of the following: performing physical exam; counseling and coordination of care; obtaining or reviewing history; documenting in the medical record; reviewing/ordering tests, medications or procedures; communicating with other healthcare professionals and discussing with patient's family/caregivers.    Current Length of Stay: 8 day(s)  Current Patient Status: Inpatient   Certification Statement: The patient will continue to require additional inpatient hospital stay due to acute respiratory failure with hypoxia requiring bronchoscopy  Discharge Plan: Anticipate discharge in >72 hrs to rehab facility.    Code Status: Level 1 - Full Code    Subjective:     Patient states his breathing seems to be slowly improving but nowhere close to his baseline    Objective:     Vitals:   Temp (24hrs), Av.9 °F (36.6 °C), Min:97.6 °F (36.4 °C), Max:98.1 °F (36.7 °C)    Temp:  [97.6 °F (36.4 °C)-98.1 °F (36.7 °C)] 97.6 °F (36.4 °C)  HR:  [57-68] 57  Resp:  [18] 18  BP: (119-147)/(76-90) 119/76  SpO2:  [88 %-93 %] 92 %  Body mass index is 27.12 kg/m².     Input and Output Summary (last 24 hours):     Intake/Output Summary (Last 24  hours) at 2/22/2024 0945  Last data filed at 2/22/2024 0330  Gross per 24 hour   Intake --   Output 2150 ml   Net -2150 ml       Physical Exam:   Physical Exam  Vitals reviewed.   Constitutional:       General: He is not in acute distress.     Appearance: He is ill-appearing.   HENT:      Head: Normocephalic and atraumatic.   Eyes:      General:         Right eye: No discharge.         Left eye: No discharge.   Cardiovascular:      Rate and Rhythm: Normal rate and regular rhythm.   Pulmonary:      Effort: Pulmonary effort is normal. No respiratory distress.      Breath sounds: No wheezing or rales.      Comments: deCreased breath sounds bilaterally. some coarse breath sounds noted  Abdominal:      General: Bowel sounds are normal. There is no distension.      Palpations: Abdomen is soft.      Tenderness: There is no abdominal tenderness.   Neurological:      Mental Status: He is alert and oriented to person, place, and time.          Additional Data:     Labs:  Results from last 7 days   Lab Units 02/19/24  0439 02/17/24  0457 02/16/24  0500   WBC Thousand/uL 10.94*   < > 7.44   HEMOGLOBIN g/dL 15.5   < > 14.8   HEMATOCRIT % 45.6   < > 42.7   PLATELETS Thousands/uL 260   < > 218   NEUTROS PCT %  --   --  61   LYMPHS PCT %  --   --  14   MONOS PCT %  --   --  12   EOS PCT %  --   --  12*    < > = values in this interval not displayed.     Results from last 7 days   Lab Units 02/22/24  0404 02/18/24  0438 02/17/24  0457   SODIUM mmol/L 135   < > 137   POTASSIUM mmol/L 4.6   < > 4.6   CHLORIDE mmol/L 102   < > 106   CO2 mmol/L 25   < > 22   BUN mg/dL 29*   < > 18   CREATININE mg/dL 0.82   < > 0.87   ANION GAP mmol/L 8   < > 9   CALCIUM mg/dL 9.6   < > 9.2   ALBUMIN g/dL  --   --  3.6   TOTAL BILIRUBIN mg/dL  --   --  0.56   ALK PHOS U/L  --   --  58   ALT U/L  --   --  8   AST U/L  --   --  18   GLUCOSE RANDOM mg/dL 108   < > 131    < > = values in this interval not displayed.         Results from last 7 days   Lab  Units 02/16/24  1803   POC GLUCOSE mg/dl 156*     Results from last 7 days   Lab Units 02/17/24  0457   HEMOGLOBIN A1C % 4.9     Results from last 7 days   Lab Units 02/19/24  0439 02/18/24  0438 02/17/24  0457   PROCALCITONIN ng/ml <0.05 0.05 0.05       Lines/Drains:  Invasive Devices       Peripheral Intravenous Line  Duration             Peripheral IV 02/22/24 Right;Ventral (anterior) Forearm <1 day              Drain  Duration             External Urinary Catheter Small <1 day                          Imaging: Reviewed radiology reports from this admission including: chest xray    Recent Cultures (last 7 days):         Last 24 Hours Medication List:   Current Facility-Administered Medications   Medication Dose Route Frequency Provider Last Rate    acetaminophen  650 mg Oral Q6H PRN ROXY Wright      aspirin  81 mg Oral Daily ROXY Muniz      atorvastatin  40 mg Oral QPM ROXY Muniz      benzonatate  200 mg Oral TID Salty Morin DO      dextromethorphan-guaiFENesin  10 mL Oral Q4H PRN Salty Morin DO      docusate sodium  100 mg Oral BID ROXY Wright      famotidine  20 mg Oral Daily ROXY Wright      furosemide  40 mg Intravenous Once Sheri Bobby DO      guaiFENesin  1,200 mg Oral Q12H [facility] ROXY Abbott      heparin (porcine)  5,000 Units Subcutaneous Q8H [facility] ROXY Wright      ipratropium-albuterol  3 mL Nebulization TID Ximena Kennedy MD      levothyroxine  88 mcg Oral Early Morning ROXY Wright      methylPREDNISolone sodium succinate  40 mg Intravenous Q12H [facility] Ximena Kennedy MD      NIFEdipine  30 mg Oral Daily RXOY Wright      oxybutynin  10 mg Oral Daily ROXY Wright      senna  1 tablet Oral Daily ROXY Wright          Today, Patient Was Seen By: Sheri Bobby DO    **Please Note: This note may have been constructed using a voice recognition system.**

## 2024-02-22 NOTE — ASSESSMENT & PLAN NOTE
Echocardiogram without LV dysfunction.  On p.o. Lasix daily at home  Chest x-ray 2/21 shows persistent slightly decreased vascular and interstitial edema  trial of 20 mg of IV Lasix x 1 on 2/21 with good response. 40mg IV x 1 today and monitor response

## 2024-02-22 NOTE — CASE MANAGEMENT
Case Management Progress Note    Patient name Neeraj Palacios  Location [address] [place] [address]/4 [place] 408-* MRN [id number]  : 1950 Date 2024       LOS (days): 8  Geometric Mean LOS (GMLOS) (days): 4.1  Days to GMLOS:-4.1        OBJECTIVE:        Current admission status: Inpatient  Preferred Pharmacy:   [facility] [address] [address]  Phone: [phone] Fax: [phone]    Primary Care Provider: Riky Herrmann PA-C    Primary Insurance: MEDICARE  Secondary Insurance:     PROGRESS NOTE:    Weekly Care Management Length of Stay Review     Current LOS: 8 Days    Most Recent Labs:     Lab Results   Component Value Date/Time    SODIUM 135 2024 04:04 AM    K 4.6 2024 04:04 AM     2024 04:04 AM    CO2 25 2024 04:04 AM    BUN 29 (H) 2024 04:04 AM    CREATININE 0.82 2024 04:04 AM    GLUC 108 2024 04:04 AM       Most Recent Vitals:   Vitals:    24 0747   BP: 119/76   Pulse: 57   Resp: 18   Temp: 97.6 °F (36.4 °C)   SpO2: 92%        Identified Barriers to Discharge/Discharge Goals/Care Management Interventions: need for bronchoscopy (likely tomorrow)    Intended Discharge Disposition: D/C to STR/SNF    Expected Discharge Date: TBD    Jose Chaudhary, LCSW, ACSW, ACM, CCM, CCTP  [place] LCSW: [id number]  [place] LSW: [id number]  24 1:08 PM

## 2024-02-23 ENCOUNTER — APPOINTMENT (OUTPATIENT)
Dept: PERIOP | Facility: HOSPITAL | Age: 74
DRG: 196 | End: 2024-02-23
Payer: MEDICARE

## 2024-02-23 ENCOUNTER — ANESTHESIA EVENT (INPATIENT)
Dept: PERIOP | Facility: HOSPITAL | Age: 74
DRG: 196 | End: 2024-02-23
Payer: MEDICARE

## 2024-02-23 ENCOUNTER — ANESTHESIA (INPATIENT)
Dept: PERIOP | Facility: HOSPITAL | Age: 74
DRG: 196 | End: 2024-02-23
Payer: MEDICARE

## 2024-02-23 PROBLEM — E87.70 VOLUME OVERLOAD: Status: ACTIVE | Noted: 2024-02-14

## 2024-02-23 LAB
ANA HOMOGEN SER QL IF: NORMAL
ANA HOMOGEN TITR SER: NORMAL {TITER}
ANION GAP SERPL CALCULATED.3IONS-SCNC: 7 MMOL/L
BUN SERPL-MCNC: 31 MG/DL (ref 5–25)
CALCIUM SERPL-MCNC: 9.4 MG/DL (ref 8.4–10.2)
CHLORIDE SERPL-SCNC: 103 MMOL/L (ref 96–108)
CO2 SERPL-SCNC: 25 MMOL/L (ref 21–32)
CREAT SERPL-MCNC: 0.85 MG/DL (ref 0.6–1.3)
ERYTHROCYTE [DISTWIDTH] IN BLOOD BY AUTOMATED COUNT: 13.7 % (ref 11.6–15.1)
GFR SERPL CREATININE-BSD FRML MDRD: 85 ML/MIN/1.73SQ M
GLUCOSE SERPL-MCNC: 103 MG/DL (ref 65–140)
GLUCOSE SERPL-MCNC: 114 MG/DL (ref 65–140)
HCT VFR BLD AUTO: 49 % (ref 36.5–49.3)
HGB BLD-MCNC: 16.9 G/DL (ref 12–17)
LYMPHOCYTES NFR BLD AUTO: 15 %
MACROPHAGES NFR FLD: 49 %
MCH RBC QN AUTO: 31 PG (ref 26.8–34.3)
MCHC RBC AUTO-ENTMCNC: 34.5 G/DL (ref 31.4–37.4)
MCV RBC AUTO: 90 FL (ref 82–98)
NEUTS SEG NFR BLD AUTO: 36 %
PATHOLOGY REVIEW: NO
PLATELET # BLD AUTO: 269 THOUSANDS/UL (ref 149–390)
PMV BLD AUTO: 9.4 FL (ref 8.9–12.7)
POTASSIUM SERPL-SCNC: 4.8 MMOL/L (ref 3.5–5.3)
RBC # BLD AUTO: 5.45 MILLION/UL (ref 3.88–5.62)
SODIUM SERPL-SCNC: 135 MMOL/L (ref 135–147)
TOTAL CELLS COUNTED SPEC: 100
WBC # BLD AUTO: 11.51 THOUSAND/UL (ref 4.31–10.16)

## 2024-02-23 PROCEDURE — NC001 PR NO CHARGE: Performed by: STUDENT IN AN ORGANIZED HEALTH CARE EDUCATION/TRAINING PROGRAM

## 2024-02-23 PROCEDURE — 31624 DX BRONCHOSCOPE/LAVAGE: CPT | Performed by: STUDENT IN AN ORGANIZED HEALTH CARE EDUCATION/TRAINING PROGRAM

## 2024-02-23 PROCEDURE — 87798 DETECT AGENT NOS DNA AMP: CPT | Performed by: STUDENT IN AN ORGANIZED HEALTH CARE EDUCATION/TRAINING PROGRAM

## 2024-02-23 PROCEDURE — 82948 REAGENT STRIP/BLOOD GLUCOSE: CPT

## 2024-02-23 PROCEDURE — 87102 FUNGUS ISOLATION CULTURE: CPT | Performed by: STUDENT IN AN ORGANIZED HEALTH CARE EDUCATION/TRAINING PROGRAM

## 2024-02-23 PROCEDURE — 99232 SBSQ HOSP IP/OBS MODERATE 35: CPT | Performed by: FAMILY MEDICINE

## 2024-02-23 PROCEDURE — 94760 N-INVAS EAR/PLS OXIMETRY 1: CPT

## 2024-02-23 PROCEDURE — 0202U NFCT DS 22 TRGT SARS-COV-2: CPT | Performed by: STUDENT IN AN ORGANIZED HEALTH CARE EDUCATION/TRAINING PROGRAM

## 2024-02-23 PROCEDURE — 94640 AIRWAY INHALATION TREATMENT: CPT

## 2024-02-23 PROCEDURE — 80048 BASIC METABOLIC PNL TOTAL CA: CPT | Performed by: FAMILY MEDICINE

## 2024-02-23 PROCEDURE — 85027 COMPLETE CBC AUTOMATED: CPT | Performed by: FAMILY MEDICINE

## 2024-02-23 PROCEDURE — 87116 MYCOBACTERIA CULTURE: CPT | Performed by: STUDENT IN AN ORGANIZED HEALTH CARE EDUCATION/TRAINING PROGRAM

## 2024-02-23 PROCEDURE — 87070 CULTURE OTHR SPECIMN AEROBIC: CPT | Performed by: STUDENT IN AN ORGANIZED HEALTH CARE EDUCATION/TRAINING PROGRAM

## 2024-02-23 PROCEDURE — 99232 SBSQ HOSP IP/OBS MODERATE 35: CPT | Performed by: STUDENT IN AN ORGANIZED HEALTH CARE EDUCATION/TRAINING PROGRAM

## 2024-02-23 PROCEDURE — 87252 VIRUS INOCULATION TISSUE: CPT | Performed by: STUDENT IN AN ORGANIZED HEALTH CARE EDUCATION/TRAINING PROGRAM

## 2024-02-23 PROCEDURE — 87205 SMEAR GRAM STAIN: CPT | Performed by: STUDENT IN AN ORGANIZED HEALTH CARE EDUCATION/TRAINING PROGRAM

## 2024-02-23 PROCEDURE — 87106 FUNGI IDENTIFICATION YEAST: CPT | Performed by: STUDENT IN AN ORGANIZED HEALTH CARE EDUCATION/TRAINING PROGRAM

## 2024-02-23 PROCEDURE — 87206 SMEAR FLUORESCENT/ACID STAI: CPT | Performed by: STUDENT IN AN ORGANIZED HEALTH CARE EDUCATION/TRAINING PROGRAM

## 2024-02-23 PROCEDURE — 87305 ASPERGILLUS AG IA: CPT | Performed by: STUDENT IN AN ORGANIZED HEALTH CARE EDUCATION/TRAINING PROGRAM

## 2024-02-23 PROCEDURE — 88112 CYTOPATH CELL ENHANCE TECH: CPT | Performed by: PATHOLOGY

## 2024-02-23 PROCEDURE — 89051 BODY FLUID CELL COUNT: CPT | Performed by: STUDENT IN AN ORGANIZED HEALTH CARE EDUCATION/TRAINING PROGRAM

## 2024-02-23 RX ORDER — HYDROMORPHONE HCL/PF 1 MG/ML
0.2 SYRINGE (ML) INJECTION
Status: DISCONTINUED | OUTPATIENT
Start: 2024-02-23 | End: 2024-02-24 | Stop reason: HOSPADM

## 2024-02-23 RX ORDER — LIDOCAINE HYDROCHLORIDE 20 MG/ML
10 INJECTION, SOLUTION EPIDURAL; INFILTRATION; INTRACAUDAL; PERINEURAL ONCE
Status: COMPLETED | OUTPATIENT
Start: 2024-02-23 | End: 2024-02-23

## 2024-02-23 RX ORDER — DEXAMETHASONE SODIUM PHOSPHATE 10 MG/ML
INJECTION, SOLUTION INTRAMUSCULAR; INTRAVENOUS AS NEEDED
Status: DISCONTINUED | OUTPATIENT
Start: 2024-02-23 | End: 2024-02-23

## 2024-02-23 RX ORDER — FUROSEMIDE 10 MG/ML
40 INJECTION INTRAMUSCULAR; INTRAVENOUS ONCE
Status: COMPLETED | OUTPATIENT
Start: 2024-02-23 | End: 2024-02-23

## 2024-02-23 RX ORDER — LIDOCAINE HYDROCHLORIDE 10 MG/ML
30 INJECTION, SOLUTION EPIDURAL; INFILTRATION; INTRACAUDAL; PERINEURAL ONCE
Status: COMPLETED | OUTPATIENT
Start: 2024-02-23 | End: 2024-02-23

## 2024-02-23 RX ORDER — LIDOCAINE HYDROCHLORIDE 20 MG/ML
INJECTION, SOLUTION EPIDURAL; INFILTRATION; INTRACAUDAL; PERINEURAL AS NEEDED
Status: COMPLETED | OUTPATIENT
Start: 2024-02-23 | End: 2024-02-23

## 2024-02-23 RX ORDER — SODIUM CHLORIDE, SODIUM LACTATE, POTASSIUM CHLORIDE, CALCIUM CHLORIDE 600; 310; 30; 20 MG/100ML; MG/100ML; MG/100ML; MG/100ML
100 INJECTION, SOLUTION INTRAVENOUS CONTINUOUS
Status: DISCONTINUED | OUTPATIENT
Start: 2024-02-23 | End: 2024-02-23

## 2024-02-23 RX ORDER — LIDOCAINE HYDROCHLORIDE 20 MG/ML
JELLY TOPICAL ONCE
Qty: 1 ML | Refills: 0 | Status: COMPLETED | OUTPATIENT
Start: 2024-02-23 | End: 2024-02-23

## 2024-02-23 RX ORDER — SODIUM CHLORIDE, SODIUM LACTATE, POTASSIUM CHLORIDE, CALCIUM CHLORIDE 600; 310; 30; 20 MG/100ML; MG/100ML; MG/100ML; MG/100ML
INJECTION, SOLUTION INTRAVENOUS CONTINUOUS PRN
Status: DISCONTINUED | OUTPATIENT
Start: 2024-02-23 | End: 2024-02-23

## 2024-02-23 RX ORDER — SODIUM CHLORIDE 9 MG/ML
INJECTION INTRAVENOUS AS NEEDED
Status: COMPLETED | OUTPATIENT
Start: 2024-02-23 | End: 2024-02-23

## 2024-02-23 RX ORDER — PROPOFOL 10 MG/ML
INJECTION, EMULSION INTRAVENOUS CONTINUOUS PRN
Status: DISCONTINUED | OUTPATIENT
Start: 2024-02-23 | End: 2024-02-23

## 2024-02-23 RX ORDER — ONDANSETRON 2 MG/ML
4 INJECTION INTRAMUSCULAR; INTRAVENOUS ONCE AS NEEDED
Status: DISCONTINUED | OUTPATIENT
Start: 2024-02-23 | End: 2024-02-24 | Stop reason: HOSPADM

## 2024-02-23 RX ORDER — FENTANYL CITRATE 50 UG/ML
INJECTION, SOLUTION INTRAMUSCULAR; INTRAVENOUS AS NEEDED
Status: DISCONTINUED | OUTPATIENT
Start: 2024-02-23 | End: 2024-02-23

## 2024-02-23 RX ORDER — PROPOFOL 10 MG/ML
INJECTION, EMULSION INTRAVENOUS AS NEEDED
Status: DISCONTINUED | OUTPATIENT
Start: 2024-02-23 | End: 2024-02-23

## 2024-02-23 RX ORDER — LIDOCAINE HYDROCHLORIDE 10 MG/ML
INJECTION, SOLUTION EPIDURAL; INFILTRATION; INTRACAUDAL; PERINEURAL AS NEEDED
Status: DISCONTINUED | OUTPATIENT
Start: 2024-02-23 | End: 2024-02-23

## 2024-02-23 RX ADMIN — ASPIRIN 81 MG 81 MG: 81 TABLET ORAL at 08:46

## 2024-02-23 RX ADMIN — PROPOFOL 150 MCG/KG/MIN: 10 INJECTION, EMULSION INTRAVENOUS at 13:45

## 2024-02-23 RX ADMIN — DEXAMETHASONE SODIUM PHOSPHATE 10 MG: 10 INJECTION, SOLUTION INTRAMUSCULAR; INTRAVENOUS at 13:58

## 2024-02-23 RX ADMIN — IPRATROPIUM BROMIDE AND ALBUTEROL SULFATE 3 ML: 2.5; .5 SOLUTION RESPIRATORY (INHALATION) at 13:00

## 2024-02-23 RX ADMIN — LIDOCAINE HYDROCHLORIDE 30 ML: 10 INJECTION, SOLUTION EPIDURAL; INFILTRATION; INTRACAUDAL; PERINEURAL at 16:14

## 2024-02-23 RX ADMIN — PROPOFOL 20 MG: 10 INJECTION, EMULSION INTRAVENOUS at 13:50

## 2024-02-23 RX ADMIN — BENZONATATE 200 MG: 100 CAPSULE ORAL at 08:46

## 2024-02-23 RX ADMIN — FENTANYL CITRATE 25 MCG: 50 INJECTION, SOLUTION INTRAMUSCULAR; INTRAVENOUS at 13:45

## 2024-02-23 RX ADMIN — GUAIFENESIN 1200 MG: 600 TABLET, EXTENDED RELEASE ORAL at 08:46

## 2024-02-23 RX ADMIN — IPRATROPIUM BROMIDE AND ALBUTEROL SULFATE 3 ML: 2.5; .5 SOLUTION RESPIRATORY (INHALATION) at 19:26

## 2024-02-23 RX ADMIN — DOCUSATE SODIUM 100 MG: 100 CAPSULE, LIQUID FILLED ORAL at 08:45

## 2024-02-23 RX ADMIN — PROPOFOL 20 MG: 10 INJECTION, EMULSION INTRAVENOUS at 13:47

## 2024-02-23 RX ADMIN — BENZONATATE 200 MG: 100 CAPSULE ORAL at 16:10

## 2024-02-23 RX ADMIN — LEVOTHYROXINE SODIUM 88 MCG: 88 TABLET ORAL at 05:56

## 2024-02-23 RX ADMIN — LIDOCAINE HYDROCHLORIDE 50 MG: 10 INJECTION, SOLUTION EPIDURAL; INFILTRATION; INTRACAUDAL; PERINEURAL at 13:45

## 2024-02-23 RX ADMIN — METHYLPREDNISOLONE SODIUM SUCCINATE 40 MG: 40 INJECTION, POWDER, FOR SOLUTION INTRAMUSCULAR; INTRAVENOUS at 08:45

## 2024-02-23 RX ADMIN — FUROSEMIDE 40 MG: 10 INJECTION, SOLUTION INTRAMUSCULAR; INTRAVENOUS at 10:51

## 2024-02-23 RX ADMIN — ATORVASTATIN CALCIUM 40 MG: 40 TABLET, FILM COATED ORAL at 18:40

## 2024-02-23 RX ADMIN — IPRATROPIUM BROMIDE AND ALBUTEROL SULFATE 3 ML: 2.5; .5 SOLUTION RESPIRATORY (INHALATION) at 07:13

## 2024-02-23 RX ADMIN — NIFEDIPINE 30 MG: 30 TABLET, EXTENDED RELEASE ORAL at 08:45

## 2024-02-23 RX ADMIN — DOCUSATE SODIUM 100 MG: 100 CAPSULE, LIQUID FILLED ORAL at 18:40

## 2024-02-23 RX ADMIN — OXYBUTYNIN CHLORIDE 10 MG: 5 TABLET, EXTENDED RELEASE ORAL at 08:45

## 2024-02-23 RX ADMIN — LIDOCAINE HYDROCHLORIDE: 20 JELLY TOPICAL at 16:15

## 2024-02-23 RX ADMIN — SENNOSIDES 8.6 MG: 8.6 TABLET, FILM COATED ORAL at 08:46

## 2024-02-23 RX ADMIN — FENTANYL CITRATE 25 MCG: 50 INJECTION, SOLUTION INTRAMUSCULAR; INTRAVENOUS at 13:54

## 2024-02-23 RX ADMIN — SODIUM CHLORIDE, SODIUM LACTATE, POTASSIUM CHLORIDE, AND CALCIUM CHLORIDE: .6; .31; .03; .02 INJECTION, SOLUTION INTRAVENOUS at 13:41

## 2024-02-23 RX ADMIN — FAMOTIDINE 20 MG: 20 TABLET, FILM COATED ORAL at 08:45

## 2024-02-23 RX ADMIN — GUAIFENESIN 1200 MG: 600 TABLET, EXTENDED RELEASE ORAL at 20:57

## 2024-02-23 RX ADMIN — LIDOCAINE HYDROCHLORIDE 10 ML: 20 INJECTION, SOLUTION EPIDURAL; INFILTRATION; INTRACAUDAL; PERINEURAL at 16:15

## 2024-02-23 RX ADMIN — PROPOFOL 100 MG: 10 INJECTION, EMULSION INTRAVENOUS at 13:45

## 2024-02-23 RX ADMIN — PROPOFOL 50 MG: 10 INJECTION, EMULSION INTRAVENOUS at 13:54

## 2024-02-23 RX ADMIN — LIDOCAINE HYDROCHLORIDE 12 ML: 20 INJECTION, SOLUTION EPIDURAL; INFILTRATION; INTRACAUDAL; PERINEURAL at 13:54

## 2024-02-23 RX ADMIN — SODIUM CHLORIDE 60 ML: 9 INJECTION, SOLUTION INTRAMUSCULAR; INTRAVENOUS; SUBCUTANEOUS at 13:57

## 2024-02-23 RX ADMIN — PROPOFOL 30 MG: 10 INJECTION, EMULSION INTRAVENOUS at 13:57

## 2024-02-23 RX ADMIN — METHYLPREDNISOLONE SODIUM SUCCINATE 40 MG: 40 INJECTION, POWDER, FOR SOLUTION INTRAMUSCULAR; INTRAVENOUS at 20:57

## 2024-02-23 RX ADMIN — BENZONATATE 200 MG: 100 CAPSULE ORAL at 20:57

## 2024-02-23 NOTE — ASSESSMENT & PLAN NOTE
Rapid response/stroke alert was called when patient had strokelike episode reportedly with left facial droop and dysarthria  MRI of brain: No acute CVA.  Marked hydrocephalus  Per neurology, [facility]  Per neurology  continue aspirin and atorvastatin

## 2024-02-23 NOTE — PROGRESS NOTES
"Progress Note - Pulmonary   Neeraj Palacios 73 y.o. male MRN: [id number]  Unit/Bed#: [address] [place] [address] Encounter: [id number]    Assessment/Plan:    Acute hypoxic respiratory failure, suspect due to community-acquired pneumonia              Titrate oxygen to maintain POX > or = 89%.               Activity as tolerated.  Would benefit from being out of bed to the chair.              Will need formal oxygen qualification prior to discharge.     Abnormal CT chest with groundglass opacities concerning for community-acquired pneumonia versus inflammatory etiology versus volume overload              Completed course of ceftriaxone.              Continuing on steroids as below.              Diuretics as tolerated per primary team.              Swallow evaluation noted.              Bronchoscopy today.              Repeat imaging in 6 to 8 weeks.     Emphysema/suspected COPD              Continue DuoNebs 3 times daily while inpatient.              Continue Solu-Medrol 40 mg IV every 12 hours today.  Depending on findings of bronchoscopy, can likely wean tomorrow.     Probable ILD              Follow-up autoimmune serologies, myositis panel.              Outpatient pulmonary follow-up as below.     Nicotine dependence in remission              Quit in 2000.              Continued complete cessation.     Outpatient follow-up pending course.      Chief Complaint:    \"I feel good.\"    Subjective:    Neeraj reports he is feeling good today.  He has no specific complaints.  He has n.p.o. since midnight.  He was weaned from 5 L of oxygen to 4 L while at bedside maintaining saturations 94% or greater.    Objective:    Vitals: Blood pressure 108/65, pulse 63, temperature 98.5 °F (36.9 °C), temperature source Oral, resp. rate 20, height 6' 3\" (1.905 m), weight 98.5 kg (217 lb 2.5 oz), SpO2 90%.4L [place],Body mass index is 27.14 kg/m².      Intake/Output Summary (Last 24 hours) at 2/23/2024 1058  Last data filed at 2/23/2024 0846  Gross per 24 " hour   Intake 100 ml   Output 2350 ml   Net -2250 ml       Invasive Devices       Peripheral Intravenous Line  Duration             Peripheral IV 02/22/24 Right;Ventral (anterior) Forearm 1 day              Drain  Duration             External Urinary Catheter Small 1 day                    Physical Exam:     Physical Exam  Vitals reviewed.   Constitutional:       General: He is not in acute distress.     Appearance: He is well-developed. He is not toxic-appearing or diaphoretic.      Interventions: Nasal cannula in place.   HENT:      Head: Normocephalic and atraumatic.   Eyes:      General: No scleral icterus.  Neck:      Trachea: No tracheal deviation.   Cardiovascular:      Rate and Rhythm: Normal rate and regular rhythm.      Heart sounds: S1 normal and S2 normal. No murmur heard.     No friction rub. No gallop.   Pulmonary:      Effort: Pulmonary effort is normal. No tachypnea, accessory muscle usage or respiratory distress.      Breath sounds: Normal breath sounds. No stridor. No decreased breath sounds, wheezing, rhonchi or rales.   Chest:      Chest wall: No tenderness.   Abdominal:      General: Bowel sounds are normal. There is no distension.      Palpations: Abdomen is soft.      Tenderness: There is no abdominal tenderness.   Musculoskeletal:         General: No tenderness.      Cervical back: Neck supple.      Right lower leg: No edema.      Left lower leg: No edema.   Skin:     General: Skin is warm and dry.      Findings: No rash.   Neurological:      Mental Status: He is alert and oriented to person, place, and time.      GCS: GCS eye subscore is 4. GCS verbal subscore is 5. GCS motor subscore is 6.   Psychiatric:         Speech: Speech normal.         Behavior: Behavior normal. Behavior is cooperative.       Labs: CBC:   Lab Results   Component Value Date    WBC 11.51 (H) 02/23/2024    HGB 16.9 02/23/2024    HCT 49.0 02/23/2024    MCV 90 02/23/2024     02/23/2024    RBC 5.45 02/23/2024     MCH 31.0 02/23/2024    MCHC 34.5 02/23/2024    RDW 13.7 02/23/2024    MPV 9.4 02/23/2024   , CMP:   Lab Results   Component Value Date    SODIUM 135 02/23/2024    K 4.8 02/23/2024     02/23/2024    CO2 25 02/23/2024    BUN 31 (H) 02/23/2024    CREATININE 0.85 02/23/2024    CALCIUM 9.4 02/23/2024    EGFR 85 02/23/2024     Imaging and other studies:  None today

## 2024-02-23 NOTE — CASE MANAGEMENT
Case Management Discharge Planning Note    Patient name Neeraj Palacios  Location [address] [place] [address]/4 [place] 408-* MRN [id number]  : 1950 Date 2024       Current Admission Date: 2024  Current Admission Diagnosis:Acute respiratory failure with hypoxia (HCC)   Patient Active Problem List    Diagnosis Date Noted    Stroke-like episode 2024    Hypothyroidism 2024    Acute respiratory failure with hypoxia (HCC) 2024    Hypertension 2024    Volume overload 2024    Overactive bladder 2024    Balance problem 2024    Aortic ectasia (HCC) 2024      LOS (days): 9  Geometric Mean LOS (GMLOS) (days): 5.2  Days to GMLOS:-4.1     OBJECTIVE:  Risk of Unplanned Readmission Score: 13.91       Current admission status: Inpatient   Preferred Pharmacy:   [facility] [place], [place] - [address] [place] [id number]  Phone: [phone] Fax: [phone]    Primary Care Provider: Riky Herrmann PA-C    Primary Insurance: MEDICARE  Secondary Insurance:     DISCHARGE DETAILS:    Discharge planning discussed with:: Sisters Renetta and Akila  Freedom of Choice: Yes    Comments - Freedom of Choice: NEDA spoke with sisters Renetta and Akila separately to discuss discharge planning and STR choice.  Renetta was called first and deferred a final decision to Akila as Akila lives in [place] and Renetta is in [place].  Akila hopes to visit patient while he is in STR and would like a facility as close to her home in [place] as possible.  She expressed preference again for [facility] and NEDA explained that it is not likely that patient will be accepted due to concerns about his ability to tolerate an acute rehabilitation level of care.      NEDA searched [employer] again with Akila's zip code as the central point and reviewed other facilities closer to Akila.  Akila stated that she had received a call from [facility] and she ultimately decided on [facility] if [facility] is  unable to accept patient.  SW reserved facility in [facility] and will continue to follow.      CM contacted family/caregiver?: Yes  Were Treatment Team discharge recommendations reviewed with patient/caregiver?: Yes  Did patient/caregiver verbalize understanding of patient care needs?: N/A- going to facility  Were patient/caregiver advised of the risks associated with not following Treatment Team discharge recommendations?: Yes    Contacts  Patient Contacts: Akila Locke (sister)  Relationship to Patient:: Family  Contact Method: Phone  Phone Number: [phone]  Reason/Outcome: Referral, Discharge Planning    Requested Home Health Care         Is the patient interested in HHC at discharge?: No    DME Referral Provided  Referral made for DME?: No    Other Referral/Resources/Interventions Provided:  Interventions: Short Term Rehab    Would you like to participate in our [facility] service program?  : No - Declined    Treatment Team Recommendation: Short Term Rehab  Discharge Destination Plan:: Short Term Rehab  Transport at Discharge : BLS Ambulance, Wheelchair van

## 2024-02-23 NOTE — ANESTHESIA POSTPROCEDURE EVALUATION
Post-Op Assessment Note    CV Status:  Stable  Pain Score: 0    Pain management: adequate       Mental Status:  Alert and awake   Hydration Status:  Euvolemic   PONV Controlled:  Controlled   Airway Patency:  Patent     Post Op Vitals Reviewed: Yes    No anethesia notable event occurred.    Staff: Anesthesiologist, CRNA               BP   121/60   Temp   98   Pulse  68   Resp   16   SpO2   97

## 2024-02-23 NOTE — H&P
"H&P Exam - Neeraj Palacios 73 y.o. male MRN: [id number]    Unit/Bed#: [address] [place] [address] Encounter: [id number]    Assessment:  73M with a  PMH of HTN, Prostate cancer who presented from [place] with worsening lethargy and cough found to have significant hypoxia.         Plan:  Hypoxic Respiratory Failure - ILD Flare  Plan for OR bronchoscopy and BAL  Ashok Maguire MD  [facility]    History of Present Illness   73M with a  PMH of HTN, Prostate cancer who presented from [place] with worsening lethargy and cough found to have significant hypoxia.Intially treated for PNA but with minimal infectious symptoms. Hypoxia improved on steroid therapy,    ROS See HPI    Historical Information   Past Medical History:   Diagnosis Date    GERD without esophagitis     Hypertension     Hypothyroid     Prostate CA (HCC)      History reviewed. No pertinent surgical history.  Social History   Social History     Substance and Sexual Activity   Alcohol Use Yes    Alcohol/week: 1.0 standard drink of alcohol    Types: 1 Cans of beer per week     Social History     Substance and Sexual Activity   Drug Use Never     Social History     Tobacco Use   Smoking Status Former    Types: Cigarettes   Smokeless Tobacco Never     E-Cigarette Use: Never User     E-Cigarette/Vaping Substances       Family History: non-contributory    Meds/Allergies   all medications and allergies reviewed  No Known Allergies    Objective   First Vitals:   Blood Pressure: 149/83 (02/14/24 0507)  Pulse: 89 (02/14/24 0507)  Temperature: 97.9 °F (36.6 °C) (02/14/24 0507)  Temp Source: Oral (02/14/24 0507)  Respirations: (!) 30 (02/14/24 0507)  Height: 6' 3\" (190.5 cm) (02/14/24 0615)  Weight - Scale: 107 kg (236 lb 12.4 oz) (02/14/24 0615)  SpO2: (!) 84 % (02/14/24 0507)    Current Vitals:   Blood Pressure: 108/65 (02/23/24 0841)  Pulse: 63 (02/23/24 0841)  Temperature: 98.5 °F (36.9 °C) (02/23/24 0841)  Temp Source: Oral (02/23/24 0841)  Respirations: 20 " "(02/23/24 0841)  Height: 6' 3\" (190.5 cm) (02/14/24 1335)  Weight - Scale: 98.5 kg (217 lb 2.5 oz) (02/23/24 0600)  SpO2: 97 % (02/23/24 1300)      Intake/Output Summary (Last 24 hours) at 2/23/2024 1325  Last data filed at 2/23/2024 1226  Gross per 24 hour   Intake 100 ml   Output 3250 ml   Net -3150 ml       Invasive Devices       Peripheral Intravenous Line  Duration             Peripheral IV 02/22/24 Right;Ventral (anterior) Forearm 1 day              Drain  Duration             External Urinary Catheter Small 1 day                  Physical Exam:  General: NAD  Neuro: AxO 3  Heart: RRR  Lungs: Basilar crackles  Abdomen: Soft NT   Extremities: No edema    Labs  CBC plt wnl    Imaging: CT Chest   Diffuse consolidative ground glass, bronchiectasis, with reticulations        Code Status: Level 1 - Full Code      "

## 2024-02-23 NOTE — PHYSICAL THERAPY NOTE
02/23/24 1350   Note Type   Note Type Cancelled Session   Cancel Reasons Patient off floor/test  (Bronchoscopy)   Licensure   [place] License Number  Astrid Mtz, PT [id number]

## 2024-02-23 NOTE — OCCUPATIONAL THERAPY NOTE
Occupational Therapy Cancel Note     Patient Name: Neeraj Palacios  Today's Date: 2/23/2024  Problem List  Principal Problem:    Acute respiratory failure with hypoxia (HCC)  Active Problems:    Hypertension    Volume overload    Overactive bladder    Aortic ectasia (HCC)    Stroke-like episode    Hypothyroidism       02/23/24 1233   Note Type   Note Type Cancelled Session  (Friday 2/23/24)   Cancel Reasons Patient to operating room  (per RN will be leaving floor shortly for OR for Bronchoscopy. Will cancel and continue to follow as appropriate / schedule allows)   Licensure   [place] License Number  Ciera Villeda, OTR/L [id number]     Ciera Villeda, OTR/L  [id number]  [id number]

## 2024-02-23 NOTE — ASSESSMENT & PLAN NOTE
Patient had leg edema previously which has since resolved   echocardiogram with normal EF and indeterminant diastolic function. On p.o. Lasix daily at home  Suspecting component of volume overload causing patient's symptoms as well  Chest x-ray 2/21 shows persistent slightly decreased vascular and interstitial edema  trial of 20 mg of IV Lasix x 1 on 2/21 with good response. 40mg IV x 1 given on 2/22 and another dose ordered today  Monitor response

## 2024-02-23 NOTE — PROGRESS NOTES
[facility]  Progress Note  Name: Neeraj Palacios I  MRN: [id number]  Unit/Bed#: 4 [place] 408-01 I Date of Admission: 2/14/2024   Date of Service: 2/23/2024 I Hospital Day: 9    Assessment/Plan   * Acute respiratory failure with hypoxia (HCC)  Assessment & Plan  History of hypertension hypothyroidism OAB presents from [facility] for cough hypoxia shortness of breath found to have multifocal pneumonia and probable ILD  Pulmonary following  Multifocal pneumonia: Completed Rocephin  Likely due to ILD flare.  CRP elevated but trended down  Continue 40 mg of IV Solu-Medrol every 12 hours. O2 requirement fluctuates but currently able to titrate down to 2.5 L .  Previously noted to be desaturating with minimal exertion  TRISTAN positive. ANCA, aldolase, CCP, ds-DNA neg.   HP panel, myositis panel pending  Plan for bronchoscopy today per pulmonary    Results from last 7 days   Lab Units 02/19/24  0439 02/18/24  0438 02/17/24  0457   PROCALCITONIN ng/ml <0.05 0.05 0.05       Results from last 7 days   Lab Units 02/21/24  0534 02/19/24  0439 02/17/24  0457   CRP mg/L 5.0* 12.1* 75.2*         Volume overload  Assessment & Plan  Patient had leg edema previously which has since resolved   echocardiogram with normal EF and indeterminant diastolic function. On p.o. Lasix daily at home  Suspecting component of volume overload causing patient's symptoms as well  Chest x-ray 2/21 shows persistent slightly decreased vascular and interstitial edema  trial of 20 mg of IV Lasix x 1 on 2/21 with good response. 40mg IV x 1 given on 2/22 and another dose ordered today  Monitor response    Stroke-like episode  Assessment & Plan  Rapid response/stroke alert was called when patient had strokelike episode reportedly with left facial droop and dysarthria  MRI of brain: No acute CVA.  Marked hydrocephalus  Per neurology, outpatient [facility] follow-up at [facility]  Per neurology  continue aspirin and  atorvastatin    Hypothyroidism  Assessment & Plan  Continue levothyroxine.    Aortic ectasia (HCC)  Assessment & Plan  Needs outpatient follow-up.    Overactive bladder  Assessment & Plan  Prior to admission on myrbetriq.  Currently on oxybutynin.    Hypertension  Assessment & Plan  Continue nifedipine.               VTE Pharmacologic Prophylaxis: VTE Score: 3 Moderate Risk (Score 3-4) - Pharmacological DVT Prophylaxis Contraindicated. Sequential Compression Devices Ordered.    Mobility:   Basic Mobility Inpatient Raw Score: 8  -HLM Goal: 3: Sit at edge of bed  JH-HLM Achieved: 1: Laying in bed  HLM Goal NOT achieved. Continue with multidisciplinary rounding and encourage appropriate mobility to improve upon HLM goals.    Patient Centered Rounds: I performed bedside rounds with nursing staff today.   Discussions with Specialists or Other Care Team Provider: yes - pulm    Education and Discussions with Family / Patient: yes    Total Time Spent on Date of Encounter in care of patient: This time was spent on one or more of the following: performing physical exam; counseling and coordination of care; obtaining or reviewing history; documenting in the medical record; reviewing/ordering tests, medications or procedures; communicating with other healthcare professionals and discussing with patient's family/caregivers.    Current Length of Stay: 9 day(s)  Current Patient Status: Inpatient   Certification Statement: The patient will continue to require additional inpatient hospital stay due to acute respiratory failure with hypoxia requiring IV steroids and bronchoscopy today  Discharge Plan: Anticipate discharge in >72 hrs to rehab facility.    Code Status: Level 1 - Full Code    Subjective:     Patient reports less cough and feeling less short of breath compared to before    Objective:     Vitals:   Temp (24hrs), Av.5 °F (36.4 °C), Min:97.3 °F (36.3 °C), Max:97.6 °F (36.4 °C)    Temp:  [97.3 °F (36.3 °C)-97.6 °F  (36.4 °C)] 97.6 °F (36.4 °C)  HR:  [50-69] 63  Resp:  [18-20] 18  BP: (108-130)/(64-83) 108/65  SpO2:  [86 %-96 %] 90 %  Body mass index is 27.14 kg/m².     Input and Output Summary (last 24 hours):     Intake/Output Summary (Last 24 hours) at 2/23/2024 0931  Last data filed at 2/23/2024 0600  Gross per 24 hour   Intake --   Output 2850 ml   Net -2850 ml       Physical Exam:   Physical Exam  Vitals reviewed.   Constitutional:       General: He is not in acute distress.     Appearance: He is ill-appearing.   HENT:      Head: Normocephalic and atraumatic.   Eyes:      General:         Right eye: No discharge.         Left eye: No discharge.   Cardiovascular:      Rate and Rhythm: Normal rate and regular rhythm.   Pulmonary:      Effort: Pulmonary effort is normal. No respiratory distress.      Breath sounds: No wheezing or rales.      Comments: Decreased breath sounds bilaterally, some crackles noted  Abdominal:      General: Bowel sounds are normal. There is no distension.      Palpations: Abdomen is soft.      Tenderness: There is no abdominal tenderness.   Musculoskeletal:      Right lower leg: No edema.      Left lower leg: No edema.   Neurological:      Mental Status: He is alert and oriented to person, place, and time.          Additional Data:     Labs:  Results from last 7 days   Lab Units 02/23/24  0424   WBC Thousand/uL 11.51*   HEMOGLOBIN g/dL 16.9   HEMATOCRIT % 49.0   PLATELETS Thousands/uL 269     Results from last 7 days   Lab Units 02/23/24  0424 02/18/24  0438 02/17/24  0457   SODIUM mmol/L 135   < > 137   POTASSIUM mmol/L 4.8   < > 4.6   CHLORIDE mmol/L 103   < > 106   CO2 mmol/L 25   < > 22   BUN mg/dL 31*   < > 18   CREATININE mg/dL 0.85   < > 0.87   ANION GAP mmol/L 7   < > 9   CALCIUM mg/dL 9.4   < > 9.2   ALBUMIN g/dL  --   --  3.6   TOTAL BILIRUBIN mg/dL  --   --  0.56   ALK PHOS U/L  --   --  58   ALT U/L  --   --  8   AST U/L  --   --  18   GLUCOSE RANDOM mg/dL 114   < > 131    < > = values  in this interval not displayed.         Results from last 7 days   Lab Units 02/16/24  1803   POC GLUCOSE mg/dl 156*     Results from last 7 days   Lab Units 02/17/24  0457   HEMOGLOBIN A1C % 4.9     Results from last 7 days   Lab Units 02/19/24  0439 02/18/24  0438 02/17/24  0457   PROCALCITONIN ng/ml <0.05 0.05 0.05       Lines/Drains:  Invasive Devices       Peripheral Intravenous Line  Duration             Peripheral IV 02/22/24 Right;Ventral (anterior) Forearm 1 day              Drain  Duration             External Urinary Catheter Small 1 day                          Imaging: No pertinent imaging reviewed.    Recent Cultures (last 7 days):         Last 24 Hours Medication List:   Current Facility-Administered Medications   Medication Dose Route Frequency Provider Last Rate    acetaminophen  650 mg Oral Q6H PRN ROXY Wright      aspirin  81 mg Oral Daily ROXY Muniz      atorvastatin  40 mg Oral QPM RXOY Muniz      benzonatate  200 mg Oral TID Salty Morin DO      dextromethorphan-guaiFENesin  10 mL Oral Q4H PRN Salty Morin DO      docusate sodium  100 mg Oral BID ROXY Wright      famotidine  20 mg Oral Daily ROXY Wright      guaiFENesin  1,200 mg Oral Q12H [facility] ROXY Abbott      ipratropium-albuterol  3 mL Nebulization TID Ximena Kennedy MD      levothyroxine  88 mcg Oral Early Morning ROXY Wright      methylPREDNISolone sodium succinate  40 mg Intravenous Q12H [facility] Ximena Kennedy MD      NIFEdipine  30 mg Oral Daily ROXY Wright      oxybutynin  10 mg Oral Daily ROXY Wright      senna  1 tablet Oral Daily ROXY Wright          Today, Patient Was Seen By: Sheri Bobby DO    **Please Note: This note may have been constructed using a voice recognition system.**

## 2024-02-23 NOTE — ANESTHESIA PREPROCEDURE EVALUATION
Procedure:  BRONCHOSCOPY    Relevant Problems   ANESTHESIA (within normal limits)      CARDIO   (+) Aortic ectasia (HCC)   (+) Hypertension      ENDO   (+) Hypothyroidism      PULMONARY   (+) Acute respiratory failure with hypoxia (HCC)        Physical Exam    Airway    Mallampati score: II  TM Distance: >3 FB  Neck ROM: full     Dental   No notable dental hx     Cardiovascular  Rhythm: regular, Rate: normal    Pulmonary   Breath sounds clear to auscultation    Other Findings      Anesthesia Plan  ASA Score- 3     Anesthesia Type- general with ASA Monitors.         Additional Monitors:     Airway Plan: LMA.           Plan Factors-    Chart reviewed. EKG reviewed.  Existing labs reviewed. Patient summary reviewed.    Patient is not a current smoker.              Induction- intravenous.    Postoperative Plan- . Planned trial extubation    Informed Consent- Anesthetic plan and risks discussed with patient.  I personally reviewed this patient with the CRNA. Discussed and agreed on the Anesthesia Plan with the CRNA..

## 2024-02-23 NOTE — ASSESSMENT & PLAN NOTE
History of hypertension hypothyroidism OAB presents from [facility] for cough hypoxia shortness of breath found to have multifocal pneumonia and probable ILD  Pulmonary following  Multifocal pneumonia: Completed Rocephin  Likely due to ILD flare.  CRP elevated but trended down  Continue 40 mg of IV Solu-Medrol every 12 hours. O2 requirement fluctuates but currently able to titrate down to 2.5 L .  Previously noted to be desaturating with minimal exertion  TRISTAN positive. ANCA, aldolase, CCP, ds-DNA neg.   HP panel, myositis panel pending  Plan for bronchoscopy today per pulmonary    Results from last 7 days   Lab Units 02/19/24  0439 02/18/24  0438 02/17/24  0457   PROCALCITONIN ng/ml <0.05 0.05 0.05       Results from last 7 days   Lab Units 02/21/24  0534 02/19/24  0439 02/17/24  0457   CRP mg/L 5.0* 12.1* 75.2*

## 2024-02-24 LAB
ANION GAP SERPL CALCULATED.3IONS-SCNC: 8 MMOL/L
B PARAP IS1001 DNA NPH QL NAA+NON-PROBE: NOT DETECTED
B PERT.PT PRMT NPH QL NAA+NON-PROBE: NOT DETECTED
BUN SERPL-MCNC: 37 MG/DL (ref 5–25)
C PNEUM DNA NPH QL NAA+NON-PROBE: NOT DETECTED
CALCIUM SERPL-MCNC: 9.8 MG/DL (ref 8.4–10.2)
CHLORIDE SERPL-SCNC: 101 MMOL/L (ref 96–108)
CO2 SERPL-SCNC: 26 MMOL/L (ref 21–32)
CREAT SERPL-MCNC: 0.95 MG/DL (ref 0.6–1.3)
FLUAV RNA NPH QL NAA+NON-PROBE: NOT DETECTED
FLUBV RNA NPH QL NAA+NON-PROBE: NOT DETECTED
GFR SERPL CREATININE-BSD FRML MDRD: 78 ML/MIN/1.73SQ M
GLUCOSE SERPL-MCNC: 118 MG/DL (ref 65–140)
HADV DNA NPH QL NAA+NON-PROBE: NOT DETECTED
HCOV 229E RNA NPH QL NAA+NON-PROBE: NOT DETECTED
HCOV HKU1 RNA NPH QL NAA+NON-PROBE: NOT DETECTED
HCOV NL63 RNA NPH QL NAA+NON-PROBE: NOT DETECTED
HCOV OC43 RNA NPH QL NAA+NON-PROBE: NOT DETECTED
HMPV RNA NPH QL NAA+NON-PROBE: NOT DETECTED
HPIV1 RNA NPH QL NAA+NON-PROBE: NOT DETECTED
HPIV2 RNA NPH QL NAA+NON-PROBE: NOT DETECTED
HPIV3 RNA NPH QL NAA+NON-PROBE: NOT DETECTED
HPIV4 RNA NPH QL NAA+NON-PROBE: NOT DETECTED
M PNEUMO DNA NPH QL NAA+NON-PROBE: NOT DETECTED
MAGNESIUM SERPL-MCNC: 2.4 MG/DL (ref 1.9–2.7)
POTASSIUM SERPL-SCNC: 4.4 MMOL/L (ref 3.5–5.3)
RHODAMINE-AURAMINE STN SPEC: NORMAL
RSV RNA NPH QL NAA+NON-PROBE: NOT DETECTED
RV+EV RNA NPH QL NAA+NON-PROBE: NOT DETECTED
SARS-COV-2 RNA NPH QL NAA+NON-PROBE: NOT DETECTED
SODIUM SERPL-SCNC: 135 MMOL/L (ref 135–147)

## 2024-02-24 PROCEDURE — 94760 N-INVAS EAR/PLS OXIMETRY 1: CPT

## 2024-02-24 PROCEDURE — 80048 BASIC METABOLIC PNL TOTAL CA: CPT | Performed by: FAMILY MEDICINE

## 2024-02-24 PROCEDURE — 94640 AIRWAY INHALATION TREATMENT: CPT

## 2024-02-24 PROCEDURE — 99232 SBSQ HOSP IP/OBS MODERATE 35: CPT | Performed by: FAMILY MEDICINE

## 2024-02-24 PROCEDURE — 83735 ASSAY OF MAGNESIUM: CPT | Performed by: FAMILY MEDICINE

## 2024-02-24 RX ORDER — NYSTATIN 100000 [USP'U]/G
POWDER TOPICAL 2 TIMES DAILY PRN
Status: DISCONTINUED | OUTPATIENT
Start: 2024-02-24 | End: 2024-02-24

## 2024-02-24 RX ORDER — HEPARIN SODIUM 5000 [USP'U]/ML
5000 INJECTION, SOLUTION INTRAVENOUS; SUBCUTANEOUS EVERY 8 HOURS SCHEDULED
Status: DISCONTINUED | OUTPATIENT
Start: 2024-02-24 | End: 2024-02-25 | Stop reason: HOSPADM

## 2024-02-24 RX ORDER — PREDNISONE 20 MG/1
40 TABLET ORAL 2 TIMES DAILY WITH MEALS
Status: DISCONTINUED | OUTPATIENT
Start: 2024-02-24 | End: 2024-02-25 | Stop reason: HOSPADM

## 2024-02-24 RX ORDER — FUROSEMIDE 10 MG/ML
20 INJECTION INTRAMUSCULAR; INTRAVENOUS ONCE
Status: COMPLETED | OUTPATIENT
Start: 2024-02-24 | End: 2024-02-24

## 2024-02-24 RX ORDER — SULFAMETHOXAZOLE AND TRIMETHOPRIM 800; 160 MG/1; MG/1
1 TABLET ORAL 3 TIMES WEEKLY
Status: DISCONTINUED | OUTPATIENT
Start: 2024-02-26 | End: 2024-02-25 | Stop reason: HOSPADM

## 2024-02-24 RX ORDER — NYSTATIN 100000 [USP'U]/G
POWDER TOPICAL 2 TIMES DAILY
Status: DISCONTINUED | OUTPATIENT
Start: 2024-02-24 | End: 2024-02-25 | Stop reason: HOSPADM

## 2024-02-24 RX ADMIN — GUAIFENESIN 1200 MG: 600 TABLET, EXTENDED RELEASE ORAL at 20:04

## 2024-02-24 RX ADMIN — LEVOTHYROXINE SODIUM 88 MCG: 88 TABLET ORAL at 05:04

## 2024-02-24 RX ADMIN — SENNOSIDES 8.6 MG: 8.6 TABLET, FILM COATED ORAL at 09:01

## 2024-02-24 RX ADMIN — GUAIFENESIN 1200 MG: 600 TABLET, EXTENDED RELEASE ORAL at 09:01

## 2024-02-24 RX ADMIN — OXYBUTYNIN CHLORIDE 10 MG: 5 TABLET, EXTENDED RELEASE ORAL at 09:01

## 2024-02-24 RX ADMIN — PREDNISONE 40 MG: 20 TABLET ORAL at 17:33

## 2024-02-24 RX ADMIN — IPRATROPIUM BROMIDE AND ALBUTEROL SULFATE 3 ML: 2.5; .5 SOLUTION RESPIRATORY (INHALATION) at 13:58

## 2024-02-24 RX ADMIN — IPRATROPIUM BROMIDE AND ALBUTEROL SULFATE 3 ML: 2.5; .5 SOLUTION RESPIRATORY (INHALATION) at 19:13

## 2024-02-24 RX ADMIN — HEPARIN SODIUM 5000 UNITS: 5000 INJECTION INTRAVENOUS; SUBCUTANEOUS at 21:11

## 2024-02-24 RX ADMIN — BENZONATATE 200 MG: 100 CAPSULE ORAL at 09:01

## 2024-02-24 RX ADMIN — ACETAMINOPHEN 650 MG: 325 TABLET ORAL at 20:04

## 2024-02-24 RX ADMIN — ASPIRIN 81 MG 81 MG: 81 TABLET ORAL at 09:01

## 2024-02-24 RX ADMIN — IPRATROPIUM BROMIDE AND ALBUTEROL SULFATE 3 ML: 2.5; .5 SOLUTION RESPIRATORY (INHALATION) at 07:27

## 2024-02-24 RX ADMIN — BENZONATATE 200 MG: 100 CAPSULE ORAL at 20:04

## 2024-02-24 RX ADMIN — METHYLPREDNISOLONE SODIUM SUCCINATE 40 MG: 40 INJECTION, POWDER, FOR SOLUTION INTRAMUSCULAR; INTRAVENOUS at 09:01

## 2024-02-24 RX ADMIN — ATORVASTATIN CALCIUM 40 MG: 40 TABLET, FILM COATED ORAL at 17:33

## 2024-02-24 RX ADMIN — NIFEDIPINE 30 MG: 30 TABLET, EXTENDED RELEASE ORAL at 09:01

## 2024-02-24 RX ADMIN — FAMOTIDINE 20 MG: 20 TABLET, FILM COATED ORAL at 09:01

## 2024-02-24 RX ADMIN — BENZONATATE 200 MG: 100 CAPSULE ORAL at 17:35

## 2024-02-24 RX ADMIN — NYSTATIN: 100000 POWDER TOPICAL at 21:53

## 2024-02-24 RX ADMIN — DOCUSATE SODIUM 100 MG: 100 CAPSULE, LIQUID FILLED ORAL at 09:01

## 2024-02-24 RX ADMIN — DOCUSATE SODIUM 100 MG: 100 CAPSULE, LIQUID FILLED ORAL at 17:33

## 2024-02-24 RX ADMIN — FUROSEMIDE 20 MG: 10 INJECTION, SOLUTION INTRAMUSCULAR; INTRAVENOUS at 20:06

## 2024-02-24 NOTE — CASE MANAGEMENT
Case Management Discharge Planning Note    Patient name Neeraj Palacios  Location [address] [place] [address]/[address] [place] [address]-* MRN [id number]  : 1950 Date 2024       Current Admission Date: 2024  Current Admission Diagnosis:Acute respiratory failure with hypoxia (HCC)   Patient Active Problem List    Diagnosis Date Noted    Stroke-like episode 2024    Hypothyroidism 2024    Acute respiratory failure with hypoxia (HCC) 2024    Hypertension 2024    Volume overload 2024    Overactive bladder 2024    Balance problem 2024    Aortic ectasia (HCC) 2024      LOS (days): 10  Geometric Mean LOS (GMLOS) (days): 5.2  Days to GMLOS:-5.2     OBJECTIVE:  Risk of Unplanned Readmission Score: 13.66       Current admission status: Inpatient   Preferred Pharmacy:   [facility] [address] [address]  Phone: [phone] Fax: [phone]    Primary Care Provider: Riky Herrmann PA-C    Primary Insurance: MEDICARE  Secondary Insurance:     DISCHARGE DETAILS:    Discharge planning discussed with:: Sister Akila Locke      CM contacted family/caregiver?: Yes (SW spoke by phone with sister Akila to notify her of patient's possible discharge to [facility] tomorrow.)    Were Treatment Team discharge recommendations reviewed with patient/caregiver?: Yes  Did patient/caregiver verbalize understanding of patient care needs?: N/A- going to facility  Were patient/caregiver advised of the risks associated with not following Treatment Team discharge recommendations?: Yes    Contacts  Patient Contacts: Akila Locke (sister)  Relationship to Patient:: Family  Contact Method: Phone  Phone Number: [phone]  Reason/Outcome: Discharge Planning    Requested Home Health Care         Is the patient interested in HHC at discharge?: No    DME Referral Provided  Referral made for DME?: No    Other Referral/Resources/Interventions Provided:  Interventions:  Short Term Rehab, Transportation  Referral Comments: [facility] facility notified in [facility] of possible discharge tomorrow    Would you like to participate in our [facility] service program?  : No - Declined    Treatment Team Recommendation: Short Term Rehab  Discharge Destination Plan:: Short Term Rehab  Transport at Discharge : [facility]  Dispatcher Contacted: Yes  Number/Name of Dispatcher: [facility] via [employer]  Transported by (Company and Unit #): [facility]  ETA of Transport (Date): 02/25/24  ETA of Transport (Time): 1500           IMM Given (Date):: 02/24/24  IMM Given to:: Family (IMM reviewed with sister Akila by phone and she verbalized acknowledgement.  Copy given to patient and copy placed in scan bin for chart.)        Accepting Facility Name, City & State : [facility]  Receiving Facility/Agency Phone Number: [phone], [facility] [phone]  Facility/Agency Fax Number: [phone]

## 2024-02-24 NOTE — PLAN OF CARE
Problem: Prexisting or High Potential for Compromised Skin Integrity  Goal: Skin integrity is maintained or improved  Description: INTERVENTIONS:  - Identify patients at risk for skin breakdown  - Assess and monitor skin integrity  - Assess and monitor nutrition and hydration status  - Monitor labs   - Assess for incontinence   - Turn and reposition patient  - Assist with mobility/ambulation  - Relieve pressure over bony prominences  - Avoid friction and shearing  - Provide appropriate hygiene as needed including keeping skin clean and dry  - Evaluate need for skin moisturizer/barrier cream  - Collaborate with interdisciplinary team   - Patient/family teaching  - Consider wound care consult   Outcome: Progressing     Problem: SAFETY ADULT  Goal: Maintains/Returns to pre admission functional level  Description: INTERVENTIONS:  - Perform AM-PAC 6 Click Basic Mobility/ Daily Activity assessment daily.  - Set and communicate daily mobility goal to care team and patient/family/caregiver.   - Collaborate with rehabilitation services on mobility goals if consulted  - Perform Range of Motion 3 times a day.  - Reposition patient every 2 hours.  - Dangle patient 3 times a day  - Stand patient 3 times a day  - Ambulate patient 3 times a day  - Out of bed to chair 3 times a day   - Out of bed for meals 3 times a day  - Out of bed for toileting  - Record patient progress and toleration of activity level   Outcome: Progressing

## 2024-02-24 NOTE — PROGRESS NOTES
[facility]  Progress Note  Name: Neeraj Palacios I  MRN: [id number]  Unit/Bed#: 4 [place] 408-01 I Date of Admission: 2/14/2024   Date of Service: 2/24/2024 I Hospital Day: 10    Assessment/Plan   * Acute respiratory failure with hypoxia (HCC)  Assessment & Plan  History of hypertension hypothyroidism OAB presents from [facility] for cough hypoxia shortness of breath found to have multifocal pneumonia and probable ILD  Pulmonary following  Multifocal pneumonia: Completed Rocephin  Likely due to ILD flare.  CRP elevated but trended down  Was on 40 mg of IV Solu-Medrol every 12 hours.  Started on 40 mg of p.o. prednisone twice a day along with Bactrim 3 times weekly for prophylaxis while on steroids   Discussed with pulmonary again today and plan to decrease by 10 mg every week until patient down to 10 mg daily and seen by pulmonary as outpatient   O2 requirement fluctuates but currently able to titrate down to 2.5 L .  Previously noted to be desaturating with minimal exertion  TRISTAN positive. ANCA, aldolase, CCP, ds-DNA neg. TRISTAN titer came back at 80 with cytoplasmic (nonnuclear) pattern.  Per comment it says that there is a lack of consensus regarding the clinical significance of this staining.  This was discussed with pulmonary AP to see if this was of any concern to them and if patient needs further evaluation but no further recommendation by them  HP panel, myositis panel pending  Status post bronchoscopy on 2/23 which showed dilated bronchiectatic airways with normal mucosa and no lesions    Results from last 7 days   Lab Units 02/19/24  0439 02/18/24  0438   PROCALCITONIN ng/ml <0.05 0.05       Results from last 7 days   Lab Units 02/21/24  0534 02/19/24  0439   CRP mg/L 5.0* 12.1*         Volume overload  Assessment & Plan  Patient had leg edema previously which has since resolved   echocardiogram with normal EF and indeterminant diastolic function. On p.o. Lasix daily at  home  Suspecting component of volume overload causing patient's symptoms as well  Chest x-ray 2/21 shows persistent slightly decreased vascular and interstitial edema  trial of 20 mg of IV Lasix x 1 on 2/21 and 2/24. 40mg IV x 1 given on 2/22 and 2/23  Monitor response    Stroke-like episode  Assessment & Plan  Rapid response/stroke alert was called when patient had strokelike episode reportedly with left facial droop and dysarthria  MRI of brain: No acute CVA.  Marked hydrocephalus  Per neurology, outpatient [facility]-up at [facility]  Per sister, patient has had extensive workup for [facility] in the past. Preparing to put a shunt in but work up showed it was not hydrocephalus in [place] and [place]. 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  Currently on oxybutynin    Hypertension  Assessment & Plan  Continue nifedipine           VTE Pharmacologic Prophylaxis: VTE Score: 3 Moderate Risk (Score 3-4) - Pharmacological DVT Prophylaxis Ordered: heparin.    Mobility:   Basic Mobility Inpatient Raw Score: 8  -HLM Goal: 3: Sit at edge of bed  JH-HLM Achieved: 1: Laying in bed  HLM Goal NOT achieved. Continue with multidisciplinary rounding and encourage appropriate mobility to improve upon HLM goals.    Patient Centered Rounds: I performed bedside rounds with nursing staff today.   Discussions with Specialists or Other Care Team Provider: yes - pulm    Education and Discussions with Family / Patient: Updated  (sister) via phone.    Total Time Spent on Date of Encounter in care of patient:  This time was spent on one or more of the following: performing physical exam; counseling and coordination of care; obtaining or reviewing history; documenting in the medical record; reviewing/ordering tests, medications or procedures; communicating with other healthcare professionals and discussing with patient's family/caregivers.    Current Length of Stay: 10 day(s)  Current Patient Status: Inpatient   Certification Statement: The patient will continue to require additional inpatient hospital stay due to acute respiratory failure with hypoxia due to ILD flareup requiring steroids, status post bronchoscopy awaiting cultures  Discharge Plan: Anticipate discharge tomorrow to rehab facility.    Code Status: Level 1 - Full Code    Subjective:     Patient states breathing is slowly improving.  States he is coughing less now    Objective:     Vitals:   Temp (24hrs), Av.5 °F (36.4 °C), Min:97.3 °F (36.3 °C), Max:97.6 °F (36.4 °C)    Temp:  [97.3 °F (36.3 °C)-97.6 °F (36.4 °C)] 97.6 °F (36.4 °C)  HR:  [57-80] 60  Resp:  [12-24] 24  BP: (101-127)/(58-77) 127/77  SpO2:  [91 %-97 %] 97 %  Body mass index is 27.14 kg/m².     Input and Output Summary (last 24 hours):     Intake/Output Summary (Last 24 hours) at 2024 0900  Last data filed at 2024 1417  Gross per 24 hour   Intake 200 ml   Output 900 ml   Net -700 ml       Physical Exam:   Physical Exam  Vitals reviewed.   Constitutional:       General: He is not in acute distress.     Appearance: He is ill-appearing (chronically). He is not toxic-appearing.   HENT:      Head: Normocephalic and atraumatic.   Eyes:      General:         Right eye: No discharge.         Left eye: No discharge.   Cardiovascular:      Rate and Rhythm: Normal rate and regular rhythm.   Pulmonary:      Effort: Pulmonary effort is normal. No respiratory distress.      Breath sounds: No wheezing.      Comments: Decreased breath sounds bilaterally, some crackles noted  Abdominal:       General: Bowel sounds are normal. There is no distension.      Palpations: Abdomen is soft.      Tenderness: There is no abdominal tenderness.   Musculoskeletal:      Right lower leg: No edema.      Left lower leg: No edema.   Neurological:      Mental Status: He is alert.          Additional Data:     Labs:  Results from last 7 days   Lab Units 02/23/24  0424   WBC Thousand/uL 11.51*   HEMOGLOBIN g/dL 16.9   HEMATOCRIT % 49.0   PLATELETS Thousands/uL 269     Results from last 7 days   Lab Units 02/24/24  0510   SODIUM mmol/L 135   POTASSIUM mmol/L 4.4   CHLORIDE mmol/L 101   CO2 mmol/L 26   BUN mg/dL 37*   CREATININE mg/dL 0.95   ANION GAP mmol/L 8   CALCIUM mg/dL 9.8   GLUCOSE RANDOM mg/dL 118         Results from last 7 days   Lab Units 02/23/24  1243   POC GLUCOSE mg/dl 103         Results from last 7 days   Lab Units 02/19/24  0439 02/18/24  0438   PROCALCITONIN ng/ml <0.05 0.05       Lines/Drains:  Invasive Devices       Peripheral Intravenous Line  Duration             Peripheral IV 02/22/24 Right;Ventral (anterior) Forearm 2 days              Drain  Duration             External Urinary Catheter Small 2 days                          Imaging: Reviewed radiology reports from this admission including: procedure reports    Recent Cultures (last 7 days):   Results from last 7 days   Lab Units 02/23/24  1400   GRAM STAIN RESULT  No Polys or Bacteria seen       Last 24 Hours Medication List:   Current Facility-Administered Medications   Medication Dose Route Frequency Provider Last Rate    acetaminophen  650 mg Oral Q6H PRN ROXY Wright      aspirin  81 mg Oral Daily ROXY Muniz      atorvastatin  40 mg Oral QPM ROXY Muniz      benzocaine  1 spray Mucosal Once ROXY Arango      benzonatate  200 mg Oral TID Salty Morin DO      dextromethorphan-guaiFENesin  10 mL Oral Q4H PRN Salty Morin DO      docusate sodium  100 mg Oral BID ROXY Wright      famotidine   20 mg Oral Daily ROXY Wright      guaiFENesin  1,200 mg Oral Q12H [facility] ROXY Abbott      HYDROmorphone  0.2 mg Intravenous Q10 Min PRN Adeline Hassan CRNA      ipratropium-albuterol  3 mL Nebulization TID Ximena Kennedy MD      levothyroxine  88 mcg Oral Early Morning ROXY Wright      NIFEdipine  30 mg Oral Daily ROXY Wright      ondansetron  4 mg Intravenous Once PRN Adeline Hassan CRNA      oxybutynin  10 mg Oral Daily ROXY Wright      predniSONE  40 mg Oral BID With Meals Sheri Bobby DO      senna  1 tablet Oral Daily ROXY Wright          Today, Patient Was Seen By: Sheri Bobby DO    **Please Note: This note may have been constructed using a voice recognition system.**

## 2024-02-24 NOTE — ASSESSMENT & PLAN NOTE
Rapid response/stroke alert was called when patient had strokelike episode reportedly with left facial droop and dysarthria  MRI of brain: No acute CVA.  Marked hydrocephalus  Per neurology, outpatient [facility]surgery follow-up at [facility]  Per sister, patient has had extensive workup for [facility] in the past. Preparing to put a shunt in but work up showed it was not hydrocephalus in [place] and [place]. 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

## 2024-02-24 NOTE — ASSESSMENT & PLAN NOTE
History of hypertension hypothyroidism OAB presents from [facility] living for cough hypoxia shortness of breath found to have multifocal pneumonia and probable ILD  Pulmonary following  Multifocal pneumonia: Completed Rocephin  Likely due to ILD flare.  CRP elevated but trended down  Was on 40 mg of IV Solu-Medrol every 12 hours.  Started on 40 mg of p.o. prednisone twice a day along with Bactrim 3 times weekly for prophylaxis while on steroids   Discussed with pulmonary again today and plan to decrease by 10 mg every week until patient down to 10 mg daily and seen by pulmonary as outpatient   O2 requirement fluctuates but currently able to titrate down to 2.5 L .  Previously noted to be desaturating with minimal exertion  TRISTAN positive. ANCA, aldolase, CCP, ds-DNA neg. TRISTAN titer came back at 80 with cytoplasmic (nonnuclear) pattern.  Per comment it says that there is a lack of consensus regarding the clinical significance of this staining.  This was discussed with pulmonary AP to see if this was of any concern to them and if patient needs further evaluation but no further recommendation by them  HP panel, myositis panel pending  Status post bronchoscopy on 2/23 which showed dilated bronchiectatic airways with normal mucosa and no lesions    Results from last 7 days   Lab Units 02/19/24  0439 02/18/24  0438   PROCALCITONIN ng/ml <0.05 0.05       Results from last 7 days   Lab Units 02/21/24  0534 02/19/24  0439   CRP mg/L 5.0* 12.1*

## 2024-02-24 NOTE — ASSESSMENT & PLAN NOTE
Patient had leg edema previously which has since resolved   echocardiogram with normal EF and indeterminant diastolic function. On p.o. Lasix daily at home  Suspecting component of volume overload causing patient's symptoms as well  Chest x-ray 2/21 shows persistent slightly decreased vascular and interstitial edema  trial of 20 mg of IV Lasix x 1 on 2/21 and 2/24. 40mg IV x 1 given on 2/22 and 2/23  Monitor response

## 2024-02-25 VITALS
BODY MASS INDEX: 26.73 KG/M2 | RESPIRATION RATE: 18 BRPM | DIASTOLIC BLOOD PRESSURE: 76 MMHG | SYSTOLIC BLOOD PRESSURE: 121 MMHG | WEIGHT: 215 LBS | HEIGHT: 75 IN | TEMPERATURE: 97.5 F | OXYGEN SATURATION: 92 % | HEART RATE: 67 BPM

## 2024-02-25 LAB
ANION GAP SERPL CALCULATED.3IONS-SCNC: 5 MMOL/L
BACTERIA BRONCH AEROBE CULT: NORMAL
BUN SERPL-MCNC: 30 MG/DL (ref 5–25)
CALCIUM SERPL-MCNC: 9.4 MG/DL (ref 8.4–10.2)
CHLORIDE SERPL-SCNC: 102 MMOL/L (ref 96–108)
CO2 SERPL-SCNC: 30 MMOL/L (ref 21–32)
CREAT SERPL-MCNC: 1.03 MG/DL (ref 0.6–1.3)
GFR SERPL CREATININE-BSD FRML MDRD: 71 ML/MIN/1.73SQ M
GLUCOSE SERPL-MCNC: 96 MG/DL (ref 65–140)
GRAM STN SPEC: NORMAL
POTASSIUM SERPL-SCNC: 5 MMOL/L (ref 3.5–5.3)
SODIUM SERPL-SCNC: 137 MMOL/L (ref 135–147)

## 2024-02-25 PROCEDURE — 99239 HOSP IP/OBS DSCHRG MGMT >30: CPT | Performed by: FAMILY MEDICINE

## 2024-02-25 PROCEDURE — 94760 N-INVAS EAR/PLS OXIMETRY 1: CPT

## 2024-02-25 PROCEDURE — 80048 BASIC METABOLIC PNL TOTAL CA: CPT | Performed by: FAMILY MEDICINE

## 2024-02-25 PROCEDURE — 94640 AIRWAY INHALATION TREATMENT: CPT

## 2024-02-25 RX ORDER — BENZONATATE 200 MG/1
200 CAPSULE ORAL 3 TIMES DAILY
Start: 2024-02-25

## 2024-02-25 RX ORDER — ATORVASTATIN CALCIUM 40 MG/1
40 TABLET, FILM COATED ORAL EVERY EVENING
Start: 2024-02-25

## 2024-02-25 RX ORDER — ACETAMINOPHEN 325 MG/1
650 TABLET ORAL EVERY 6 HOURS PRN
Start: 2024-02-25

## 2024-02-25 RX ORDER — SULFAMETHOXAZOLE AND TRIMETHOPRIM 800; 160 MG/1; MG/1
1 TABLET ORAL 3 TIMES WEEKLY
Start: 2024-02-26 | End: 2024-02-25

## 2024-02-25 RX ORDER — PREDNISONE 10 MG/1
TABLET ORAL 2 TIMES DAILY WITH MEALS
Start: 2024-02-25 | End: 2024-04-29

## 2024-02-25 RX ORDER — BISACODYL 10 MG
10 SUPPOSITORY, RECTAL RECTAL DAILY PRN
Start: 2024-02-25

## 2024-02-25 RX ORDER — NYSTATIN 100000 [USP'U]/G
POWDER TOPICAL 2 TIMES DAILY
Start: 2024-02-25

## 2024-02-25 RX ORDER — POLYETHYLENE GLYCOL 3350 17 G/17G
17 POWDER, FOR SOLUTION ORAL ONCE
Status: COMPLETED | OUTPATIENT
Start: 2024-02-25 | End: 2024-02-25

## 2024-02-25 RX ORDER — BISACODYL 10 MG
10 SUPPOSITORY, RECTAL RECTAL DAILY
Status: DISCONTINUED | OUTPATIENT
Start: 2024-02-25 | End: 2024-02-25 | Stop reason: HOSPADM

## 2024-02-25 RX ORDER — IPRATROPIUM BROMIDE AND ALBUTEROL SULFATE 2.5; .5 MG/3ML; MG/3ML
3 SOLUTION RESPIRATORY (INHALATION)
Start: 2024-02-25

## 2024-02-25 RX ORDER — GUAIFENESIN/DEXTROMETHORPHAN 100-10MG/5
10 SYRUP ORAL EVERY 4 HOURS PRN
Start: 2024-02-25

## 2024-02-25 RX ORDER — SENNOSIDES 8.6 MG
8.6 TABLET ORAL DAILY
Start: 2024-02-26

## 2024-02-25 RX ORDER — SULFAMETHOXAZOLE AND TRIMETHOPRIM 800; 160 MG/1; MG/1
1 TABLET ORAL EVERY 12 HOURS SCHEDULED
Qty: 12 TABLET | Refills: 0
Start: 2024-02-25 | End: 2024-04-15

## 2024-02-25 RX ORDER — ASPIRIN 81 MG/1
81 TABLET, CHEWABLE ORAL DAILY
Start: 2024-02-26

## 2024-02-25 RX ORDER — DOCUSATE SODIUM 100 MG/1
100 CAPSULE, LIQUID FILLED ORAL 2 TIMES DAILY
Start: 2024-02-25

## 2024-02-25 RX ADMIN — OXYBUTYNIN CHLORIDE 10 MG: 5 TABLET, EXTENDED RELEASE ORAL at 08:17

## 2024-02-25 RX ADMIN — NYSTATIN: 100000 POWDER TOPICAL at 08:17

## 2024-02-25 RX ADMIN — BISACODYL 10 MG: 10 SUPPOSITORY RECTAL at 11:42

## 2024-02-25 RX ADMIN — IPRATROPIUM BROMIDE AND ALBUTEROL SULFATE 3 ML: 2.5; .5 SOLUTION RESPIRATORY (INHALATION) at 07:11

## 2024-02-25 RX ADMIN — FAMOTIDINE 20 MG: 20 TABLET, FILM COATED ORAL at 08:17

## 2024-02-25 RX ADMIN — PREDNISONE 40 MG: 20 TABLET ORAL at 08:17

## 2024-02-25 RX ADMIN — LEVOTHYROXINE SODIUM 88 MCG: 88 TABLET ORAL at 06:34

## 2024-02-25 RX ADMIN — POLYETHYLENE GLYCOL 3350 17 G: 17 POWDER, FOR SOLUTION ORAL at 13:23

## 2024-02-25 RX ADMIN — NIFEDIPINE 30 MG: 30 TABLET, EXTENDED RELEASE ORAL at 08:18

## 2024-02-25 RX ADMIN — SENNOSIDES 8.6 MG: 8.6 TABLET, FILM COATED ORAL at 08:17

## 2024-02-25 RX ADMIN — GUAIFENESIN 1200 MG: 600 TABLET, EXTENDED RELEASE ORAL at 08:17

## 2024-02-25 RX ADMIN — ASPIRIN 81 MG 81 MG: 81 TABLET ORAL at 08:17

## 2024-02-25 RX ADMIN — HEPARIN SODIUM 5000 UNITS: 5000 INJECTION INTRAVENOUS; SUBCUTANEOUS at 06:34

## 2024-02-25 RX ADMIN — IPRATROPIUM BROMIDE AND ALBUTEROL SULFATE 3 ML: 2.5; .5 SOLUTION RESPIRATORY (INHALATION) at 14:17

## 2024-02-25 RX ADMIN — BENZONATATE 200 MG: 100 CAPSULE ORAL at 08:17

## 2024-02-25 RX ADMIN — DOCUSATE SODIUM 100 MG: 100 CAPSULE, LIQUID FILLED ORAL at 08:17

## 2024-02-25 NOTE — ASSESSMENT & PLAN NOTE
Needs outpatient follow-up.  Recommended low radiation dose CT chest in 1 year for follow-up  F/U with PCP

## 2024-02-25 NOTE — ASSESSMENT & PLAN NOTE
Prior to admission on myrbetriq which can be continued on discharge.  During hospitalization on oxybutynin

## 2024-02-25 NOTE — ASSESSMENT & PLAN NOTE
Patient had leg edema previously which has since resolved   echocardiogram with normal EF and indeterminant diastolic function. On p.o. Lasix daily at home  Suspecting component of volume overload causing patient's symptoms as well  Chest x-ray 2/21 shows persistent slightly decreased vascular and interstitial edema  20 mg of IV Lasix x 1 on 2/21 and 2/24. 40mg IV x 1 given on 2/22 and 2/23 with good response  Restart home Lasix on discharge

## 2024-02-25 NOTE — DISCHARGE SUMMARY
[facility]  Discharge- Neeraj Palacios 1950, 73 y.o. male MRN: [id number]  Unit/Bed#: [address] [place] [address] Encounter: [id number]  Primary Care Provider: Riky Herrmann PA-C   Date and time admitted to hospital: 2/14/2024  5:13 AM    * Acute respiratory failure with hypoxia (HCC)  Assessment & Plan  History of hypertension hypothyroidism OAB presents from [facility] for cough hypoxia shortness of breath found to have multifocal pneumonia and probable ILD  Multifocal pneumonia: Completed Rocephin  Likely due to ILD flare.  CRP elevated but trended down  Was on 40 mg of IV Solu-Medrol every 12 hours.  Started on 40 mg of p.o. prednisone twice a day   Per Dr. East, started on Bactrim DS BID 3 times weekly for prophylaxis while on steroids   Per pulmonary, decrease by 10 mg or approx 25% every week and then pt to stay on 10 mg daily until seen by pulmonary as outpatient   O2 requirement fluctuates but currently on 2L to room air at rest.  Does have increased oxygen requirement with exertion/lying flat/coughing fits. Does desaturate with minimal exertion  Titrate oxygen to maintain O2 sat greater than or equal to 89%  TRISTAN positive. ANCA, aldolase, CCP, ds-DNA neg. TRISTAN titer came back at 80 with cytoplasmic (nonnuclear) pattern.  Per comment it says that there is a lack of consensus regarding the clinical significance of this staining.  Per pulmonary, will need repeat outpatient testing which can be done on follow-up with them  HP panel, myositis panel pending  Status post bronchoscopy on 2/23 which showed dilated bronchiectatic airways with normal mucosa and no lesions  Bronchoscopy cultures pending.  Gram stain with no polys or bacteria so far.  No AFB seen    Results from last 7 days   Lab Units 02/19/24  0439   PROCALCITONIN ng/ml <0.05       Results from last 7 days   Lab Units 02/21/24  0534 02/19/24  0439   CRP mg/L 5.0* 12.1*         Volume overload  Assessment & Plan  Patient  had leg edema previously which has since resolved   echocardiogram with normal EF and indeterminant diastolic function. On p.o. Lasix daily at home  Suspecting component of volume overload causing patient's symptoms as well  Chest x-ray 2/21 shows persistent slightly decreased vascular and interstitial edema  20 mg of IV Lasix x 1 on 2/21 and 2/24. 40mg IV x 1 given on 2/22 and 2/23 with good response  Restart home Lasix on discharge    Stroke-like episode  Assessment & Plan  Rapid response/stroke alert was called when patient had strokelike episode reportedly with left facial droop and dysarthria  MRI of brain: No acute CVA.  Marked hydrocephalus  Per neurology, outpatient [facility]  Per sister, patient has had extensive workup for hydrocephalus in the past.  At one point of time, providers were preparing to put a shunt in but work up showed it was not hydrocephalus although it looks like hydrocephalus on imaging.  Has had extensive evaluation/workup with hospital in [place] and [place].  They were told it would be progressive and with time patient has become debilitated and is wheel chair bound. per sister patient has had treatment with prolonged course of antibiotics/immunoglobulin/steroids tx with no improvement.  Family not interested in continuing to pursue this further  Per sister, physician at [facility] said this could be CIDP  Per neurology, continue aspirin and atorvastatin    Hypothyroidism  Assessment & Plan  Continue levothyroxine.    Aortic ectasia (HCC)  Assessment & Plan  Needs outpatient follow-up.  Recommended low radiation dose CT chest in 1 year for follow-up  F/U with PCP    Overactive bladder  Assessment & Plan  Prior to admission on myrbetriq which can be continued on discharge.  During hospitalization on oxybutynin    Hypertension  Assessment & Plan  Continue nifedipine.      Medical Problems       Resolved Problems  Date Reviewed: 2/24/2024   None       Discharging  Physician / Practitioner: Sheri Bobby DO  PCP: Riky Herrmann PA-C  Admission Date:   Admission Orders (From admission, onward)       Ordered        02/14/24 0712  INPATIENT ADMISSION  Once                          Discharge Date: 02/25/24    Consultations During Hospital Stay:  Neurology  Pulmonary    Procedures Performed:   Echo  Bronchoscopy    Significant Findings / Test Results:   See above    Incidental Findings:   Fusiform ectasia of the ascending thoracic aorta measuring up to 44 mm.   I reviewed the above mentioned incidental findings with the patient and/or family and they expressed understanding.    Test Results Pending at Discharge (will require follow up):   [facility] cultures, cytology, HP panel, myositis panel     Outpatient Tests Requested:  TRISTAN titer    Complications: Strokelike episode    Reason for Admission: Acute respiratory failure with hypoxia    Hospital Course:   Neeraj Palacios is a 73 y.o. male patient who originally presented to the hospital on 2/14/2024 due to Lethargy, headache and coughing from [facility].  He was noted to be hypoxic to 84% on room air in the ER and was placed on 4 L of oxygen.  Patient was admitted and seen by pulmonary while here.  Treated with IV antibiotic for multifocal pneumonia.  Treated with IV steroids for possible ILD flareup.  Patient eventually underwent bronchoscopy.  Initially oxygen requirement was significantly elevated but is now improved.  Patient also diuresed with IV Lasix given suspicion for volume overload causing his symptoms as well.  During hospitalization stroke alert was called as he had left facial droop and dysarthria.  MRI was negative for CVA.  Patient was seen by neurology while here.  Oxygen requirement at rest has improved although patient does desaturate with exertion and has higher oxygen requirement with exertion/activity.  Patient cleared by all consultants involved in his care prior to discharge.  Discharge  "plan was discussed with patient and was discussed with his sister over the phone    Please see above list of diagnoses and related plan for additional information.     Condition at Discharge: stable    Discharge Day Visit / Exam:   Subjective: Patient states he continues to slowly improve.  Reported not having a bowel movement for 5 days which was confirmed with RN.  Given suppository and patient had a large bowel movement today    Vitals: Blood Pressure: 121/76 (02/25/24 0818)  Pulse: 67 (02/25/24 0818)  Temperature: 97.5 °F (36.4 °C) (02/25/24 0818)  Temp Source: Temporal (02/25/24 0818)  Respirations: 18 (02/25/24 0818)  Height: 6' 3\" (190.5 cm) (02/14/24 1335)  Weight - Scale: 97.5 kg (215 lb) (02/25/24 0600)  SpO2: 92 % (02/25/24 1318)  Exam:   Physical Exam  Vitals reviewed.   Constitutional:       General: He is not in acute distress.     Appearance: He is ill-appearing (chronically).   HENT:      Head: Normocephalic and atraumatic.   Eyes:      General:         Right eye: No discharge.         Left eye: No discharge.   Cardiovascular:      Rate and Rhythm: Normal rate and regular rhythm.   Pulmonary:      Effort: Pulmonary effort is normal. No respiratory distress.      Breath sounds: No wheezing or rales.      Comments: Decreased breath sounds bilaterally  Abdominal:      General: Bowel sounds are normal. There is no distension.      Palpations: Abdomen is soft.      Tenderness: There is no abdominal tenderness.   Musculoskeletal:      Right lower leg: No edema.      Left lower leg: No edema.   Neurological:      Mental Status: He is alert.            Discharge instructions/Information to patient and family:   See after visit summary for information provided to patient and family.      Provisions for Follow-Up Care:  See after visit summary for information related to follow-up care and any pertinent home health orders.      Mobility at time of Discharge:   Basic Mobility Inpatient Raw Score: 8  -[facility] Goal: " 3: Sit at edge of bed  [facility]-HLM Achieved: 3: Sit at edge of bed  HLM Goal achieved. Continue to encourage appropriate mobility.     Disposition:   Other Skilled Nursing Facility at [facility]    Planned Readmission: no     Discharge Statement:  I spent > 30 minutes discharging the patient. This time was spent on the day of discharge. I had direct contact with the patient on the day of discharge. Greater than 50% of the total time was spent examining patient, answering all patient questions, arranging and discussing plan of care with patient as well as directly providing post-discharge instructions.  Additional time then spent on discharge activities.    Discharge Medications:  See after visit summary for reconciled discharge medications provided to patient and/or family.      **Please Note: This note may have been constructed using a voice recognition system**

## 2024-02-25 NOTE — PLAN OF CARE
Problem: Prexisting or High Potential for Compromised Skin Integrity  Goal: Skin integrity is maintained or improved  Description: INTERVENTIONS:  - Identify patients at risk for skin breakdown  - Assess and monitor skin integrity  - Assess and monitor nutrition and hydration status  - Monitor labs   - Assess for incontinence   - Turn and reposition patient  - Assist with mobility/ambulation  - Relieve pressure over bony prominences  - Avoid friction and shearing  - Provide appropriate hygiene as needed including keeping skin clean and dry  - Evaluate need for skin moisturizer/barrier cream  - Collaborate with interdisciplinary team   - Patient/family teaching  - Consider wound care consult   Outcome: Progressing     Problem: DISCHARGE PLANNING  Goal: Discharge to home or other facility with appropriate resources  Description: INTERVENTIONS:  - Identify barriers to discharge w/patient and caregiver  - Arrange for needed discharge resources and transportation as appropriate  - Identify discharge learning needs (meds, wound care, etc.)  - Arrange for interpretive services to assist at discharge as needed  - Refer to Case Management Department for coordinating discharge planning if the patient needs post-hospital services based on physician/advanced practitioner order or complex needs related to functional status, cognitive ability, or social support system  Outcome: Progressing

## 2024-02-25 NOTE — ASSESSMENT & PLAN NOTE
Rapid response/stroke alert was called when patient had strokelike episode reportedly with left facial droop and dysarthria  MRI of brain: No acute CVA.  Marked hydrocephalus  Per neurology, outpatient [facility]surgery [facility]-up [facility]  Per sister, patient has had extensive workup for hydrocephalus in the past.  At one point of time, providers were preparing to put a shunt in but work up showed it was not hydrocephalus although it looks like hydrocephalus on imaging.  Has had extensive evaluation/workup with hospital in [place] and [place].  They were told it would be progressive and with time patient has become debilitated and is wheel chair bound. per sister patient has had treatment with prolonged course of antibiotics/immunoglobulin/steroids tx with no improvement.  Family not interested in continuing to pursue this further  Per sister, physician at [place] said this could be CIDP  Per neurology, continue aspirin and atorvastatin

## 2024-02-25 NOTE — PLAN OF CARE
Problem: Prexisting or High Potential for Compromised Skin Integrity  Goal: Skin integrity is maintained or improved  Description: INTERVENTIONS:  - Identify patients at risk for skin breakdown  - Assess and monitor skin integrity  - Assess and monitor nutrition and hydration status  - Monitor labs   - Assess for incontinence   - Turn and reposition patient  - Assist with mobility/ambulation  - Relieve pressure over bony prominences  - Avoid friction and shearing  - Provide appropriate hygiene as needed including keeping skin clean and dry  - Evaluate need for skin moisturizer/barrier cream  - Collaborate with interdisciplinary team   - Patient/family teaching  - Consider wound care consult   Outcome: Progressing     Problem: SAFETY ADULT  Goal: Patient will remain free of falls  Description: INTERVENTIONS:  - Educate patient/family on patient safety including physical limitations  - Instruct patient to call for assistance with activity   - Consult OT/PT to assist with strengthening/mobility   - Keep Call bell within reach  - Keep bed low and locked with side rails adjusted as appropriate  - Keep care items and personal belongings within reach  - Initiate and maintain comfort rounds  - Make Fall Risk Sign visible to staff  - Offer Toileting every 2 Hours, in advance of need  - Initiate/Maintain bed alarm  - Obtain necessary fall risk management equipment: n/a  - Apply yellow socks and bracelet for high fall risk patients  - Consider moving patient to room near nurses station  Outcome: Progressing     Problem: DISCHARGE PLANNING  Goal: Discharge to home or other facility with appropriate resources  Description: INTERVENTIONS:  - Identify barriers to discharge w/patient and caregiver  - Arrange for needed discharge resources and transportation as appropriate  - Identify discharge learning needs (meds, wound care, etc.)  - Arrange for interpretive services to assist at discharge as needed  - Refer to Case Management  Department for coordinating discharge planning if the patient needs post-hospital services based on physician/advanced practitioner order or complex needs related to functional status, cognitive ability, or social support system  Outcome: Progressing     Problem: Knowledge Deficit  Goal: Patient/family/caregiver demonstrates understanding of disease process, treatment plan, medications, and discharge instructions  Description: Complete learning assessment and assess knowledge base.  Interventions:  - Provide teaching at level of understanding  - Provide teaching via preferred learning methods  Outcome: Progressing     Problem: RESPIRATORY - ADULT  Goal: Achieves optimal ventilation and oxygenation  Description: INTERVENTIONS:  - Assess for changes in respiratory status  - Assess for changes in mentation and behavior  - Position to facilitate oxygenation and minimize respiratory effort  - Oxygen administered by appropriate delivery if ordered  - Initiate smoking cessation education as indicated  - Encourage broncho-pulmonary hygiene including cough, deep breathe, Incentive Spirometry  - Assess the need for suctioning and aspirate as needed  - Assess and instruct to report SOB or any respiratory difficulty  - Respiratory Therapy support as indicated  Outcome: Progressing     Problem: CARDIOVASCULAR - ADULT  Goal: Maintains optimal cardiac output and hemodynamic stability  Description: INTERVENTIONS:  - Monitor I/O, vital signs and rhythm  - Monitor for S/S and trends of decreased cardiac output  - Administer and titrate ordered vasoactive medications to optimize hemodynamic stability  - Assess quality of pulses, skin color and temperature  - Assess for signs of decreased coronary artery perfusion  - Instruct patient to report change in severity of symptoms  Outcome: Progressing     Problem: CARDIOVASCULAR - ADULT  Goal: Absence of cardiac dysrhythmias or at baseline rhythm  Description: INTERVENTIONS:  - Continuous  cardiac monitoring, vital signs, obtain 12 lead EKG if ordered  - Administer antiarrhythmic and heart rate control medications as ordered  - Monitor electrolytes and administer replacement therapy as ordered  Outcome: Progressing

## 2024-02-25 NOTE — ASSESSMENT & PLAN NOTE
History of hypertension hypothyroidism OAB presents from [facility] for cough hypoxia shortness of breath found to have multifocal pneumonia and probable ILD  Multifocal pneumonia: Completed Rocephin  Likely due to ILD flare.  CRP elevated but trended down  Was on 40 mg of IV Solu-Medrol every 12 hours.  Started on 40 mg of p.o. prednisone twice a day   Per Dr. East, started on Bactrim DS BID 3 times weekly for prophylaxis while on steroids   Per pulmonary, decrease by 10 mg or approx 25% every week and then pt to stay on 10 mg daily until seen by pulmonary as outpatient   O2 requirement fluctuates but currently on 2L to room air at rest.  Does have increased oxygen requirement with exertion/lying flat/coughing fits. Does desaturate with minimal exertion  Titrate oxygen to maintain O2 sat greater than or equal to 89%  TRISTAN positive. ANCA, aldolase, CCP, ds-DNA neg. TRISTAN titer came back at 80 with cytoplasmic (nonnuclear) pattern.  Per comment it says that there is a lack of consensus regarding the clinical significance of this staining.  Per pulmonary, will need repeat outpatient testing which can be done on follow-up with them  HP panel, myositis panel pending  Status post bronchoscopy on 2/23 which showed dilated bronchiectatic airways with normal mucosa and no lesions  Bronchoscopy cultures pending.  Gram stain with no polys or bacteria so far.  No AFB seen    Results from last 7 days   Lab Units 02/19/24  0439   PROCALCITONIN ng/ml <0.05       Results from last 7 days   Lab Units 02/21/24  0534 02/19/24  0439   CRP mg/L 5.0* 12.1*

## 2024-02-26 LAB
PNEUMOCYSTIS PCR: NEGATIVE
SCAN RESULT: NORMAL

## 2024-02-27 LAB
MYCOBACTERIUM SPEC CULT: NORMAL
RHODAMINE-AURAMINE STN SPEC: NORMAL

## 2024-02-27 PROCEDURE — 88112 CYTOPATH CELL ENHANCE TECH: CPT | Performed by: PATHOLOGY

## 2024-02-28 LAB
A FUMIGATUS1 AB SER QL ID: NEGATIVE
A PULLULANS AB SER QL: NEGATIVE
FUNGUS SPEC CULT: ABNORMAL
GALACTOMANNAN AG SPEC IA-ACNC: 0.03 INDEX (ref 0–0.49)
LACEYELLA SACCHARI AB SER QL: NEGATIVE
PIGEON SERUM AB QL ID: NEGATIVE
S RECTIVIRGULA AB SER QL ID: NEGATIVE
T VULGARIS AB SER QL ID: NEGATIVE

## 2024-03-04 LAB
MISCELLANEOUS LAB TEST RESULT: NORMAL
STONE ANALYSIS-IMP: NORMAL

## 2024-03-05 LAB
MYCOBACTERIUM SPEC CULT: NORMAL
RHODAMINE-AURAMINE STN SPEC: NORMAL

## 2024-03-12 LAB
MYCOBACTERIUM SPEC CULT: NORMAL
RHODAMINE-AURAMINE STN SPEC: NORMAL

## 2024-03-19 LAB
MYCOBACTERIUM SPEC CULT: NORMAL
RHODAMINE-AURAMINE STN SPEC: NORMAL

## 2024-03-26 LAB
MYCOBACTERIUM SPEC CULT: NORMAL
RHODAMINE-AURAMINE STN SPEC: NORMAL

## 2024-03-29 ENCOUNTER — TELEPHONE (OUTPATIENT)
Dept: NEUROLOGY | Facility: CLINIC | Age: 74
End: 2024-03-29

## 2024-03-29 NOTE — TELEPHONE ENCOUNTER
Called patient and spoke with him regarding HFU. Patient declined to schedule. I did advise him that he can schedule an HFU up to 1 yr from his d/c date, anything after that would be a new patient appt. He verbalized understanding.     Not scheduling HFU at this time      Thank you,     [facility]      Hfu/ navin grimes/ Strokelike symptoms     Dc- 2/25/2024    Neeraj Palacios will need follow up in 8-10 weeks with general attending or advance practitioner. He will not require outpatient neurological testing.

## 2024-04-02 LAB
MYCOBACTERIUM SPEC CULT: NORMAL
RHODAMINE-AURAMINE STN SPEC: NORMAL

## 2024-04-09 LAB
MYCOBACTERIUM SPEC CULT: NORMAL
RHODAMINE-AURAMINE STN SPEC: NORMAL